# Patient Record
Sex: MALE | Race: BLACK OR AFRICAN AMERICAN | NOT HISPANIC OR LATINO | URBAN - METROPOLITAN AREA
[De-identification: names, ages, dates, MRNs, and addresses within clinical notes are randomized per-mention and may not be internally consistent; named-entity substitution may affect disease eponyms.]

---

## 2017-03-29 ENCOUNTER — INPATIENT (INPATIENT)
Facility: HOSPITAL | Age: 52
LOS: 6 days | Discharge: HOME | End: 2017-04-05
Attending: PSYCHIATRY & NEUROLOGY | Admitting: PSYCHIATRY & NEUROLOGY
Payer: COMMERCIAL

## 2017-03-29 VITALS
RESPIRATION RATE: 17 BRPM | SYSTOLIC BLOOD PRESSURE: 153 MMHG | DIASTOLIC BLOOD PRESSURE: 89 MMHG | OXYGEN SATURATION: 100 % | HEART RATE: 84 BPM

## 2017-03-29 DIAGNOSIS — Z90.49 ACQUIRED ABSENCE OF OTHER SPECIFIED PARTS OF DIGESTIVE TRACT: Chronic | ICD-10-CM

## 2017-03-29 DIAGNOSIS — F25.1 SCHIZOAFFECTIVE DISORDER, DEPRESSIVE TYPE: ICD-10-CM

## 2017-03-29 LAB
ALBUMIN SERPL ELPH-MCNC: 4.2 G/DL — SIGNIFICANT CHANGE UP (ref 3.3–5)
ALP SERPL-CCNC: 74 U/L — SIGNIFICANT CHANGE UP (ref 40–120)
ALT FLD-CCNC: 25 U/L — SIGNIFICANT CHANGE UP (ref 4–41)
AMPHET UR-MCNC: NEGATIVE — SIGNIFICANT CHANGE UP
APAP SERPL-MCNC: < 15 UG/ML — LOW (ref 15–25)
APPEARANCE UR: CLEAR — SIGNIFICANT CHANGE UP
AST SERPL-CCNC: 24 U/L — SIGNIFICANT CHANGE UP (ref 4–40)
BACTERIA # UR AUTO: SIGNIFICANT CHANGE UP
BARBITURATES MEASUREMENT: NEGATIVE — SIGNIFICANT CHANGE UP
BARBITURATES UR SCN-MCNC: NEGATIVE — SIGNIFICANT CHANGE UP
BASOPHILS # BLD AUTO: 0.03 K/UL — SIGNIFICANT CHANGE UP (ref 0–0.2)
BASOPHILS NFR BLD AUTO: 0.4 % — SIGNIFICANT CHANGE UP (ref 0–2)
BENZODIAZ SERPL-MCNC: NEGATIVE — SIGNIFICANT CHANGE UP
BENZODIAZ UR-MCNC: NEGATIVE — SIGNIFICANT CHANGE UP
BILIRUB SERPL-MCNC: 0.2 MG/DL — SIGNIFICANT CHANGE UP (ref 0.2–1.2)
BILIRUB UR-MCNC: NEGATIVE — SIGNIFICANT CHANGE UP
BLOOD UR QL VISUAL: NEGATIVE — SIGNIFICANT CHANGE UP
BUN SERPL-MCNC: 18 MG/DL — SIGNIFICANT CHANGE UP (ref 7–23)
CALCIUM SERPL-MCNC: 10.2 MG/DL — SIGNIFICANT CHANGE UP (ref 8.4–10.5)
CANNABINOIDS UR-MCNC: POSITIVE — SIGNIFICANT CHANGE UP
CHLORIDE SERPL-SCNC: 99 MMOL/L — SIGNIFICANT CHANGE UP (ref 98–107)
CO2 SERPL-SCNC: 23 MMOL/L — SIGNIFICANT CHANGE UP (ref 22–31)
COCAINE METAB.OTHER UR-MCNC: NEGATIVE — SIGNIFICANT CHANGE UP
COLOR SPEC: SIGNIFICANT CHANGE UP
CREAT SERPL-MCNC: 1.07 MG/DL — SIGNIFICANT CHANGE UP (ref 0.5–1.3)
EOSINOPHIL # BLD AUTO: 0.21 K/UL — SIGNIFICANT CHANGE UP (ref 0–0.5)
EOSINOPHIL NFR BLD AUTO: 3.1 % — SIGNIFICANT CHANGE UP (ref 0–6)
ETHANOL BLD-MCNC: < 10 MG/DL — SIGNIFICANT CHANGE UP
GLUCOSE SERPL-MCNC: 97 MG/DL — SIGNIFICANT CHANGE UP (ref 70–99)
GLUCOSE UR-MCNC: NEGATIVE — SIGNIFICANT CHANGE UP
HCT VFR BLD CALC: 42.1 % — SIGNIFICANT CHANGE UP (ref 39–50)
HGB BLD-MCNC: 14.5 G/DL — SIGNIFICANT CHANGE UP (ref 13–17)
IMM GRANULOCYTES NFR BLD AUTO: 0.3 % — SIGNIFICANT CHANGE UP (ref 0–1.5)
KETONES UR-MCNC: NEGATIVE — SIGNIFICANT CHANGE UP
LEUKOCYTE ESTERASE UR-ACNC: NEGATIVE — SIGNIFICANT CHANGE UP
LYMPHOCYTES # BLD AUTO: 2.27 K/UL — SIGNIFICANT CHANGE UP (ref 1–3.3)
LYMPHOCYTES # BLD AUTO: 33.3 % — SIGNIFICANT CHANGE UP (ref 13–44)
MCHC RBC-ENTMCNC: 31.7 PG — SIGNIFICANT CHANGE UP (ref 27–34)
MCHC RBC-ENTMCNC: 34.4 % — SIGNIFICANT CHANGE UP (ref 32–36)
MCV RBC AUTO: 92.1 FL — SIGNIFICANT CHANGE UP (ref 80–100)
METHADONE UR-MCNC: NEGATIVE — SIGNIFICANT CHANGE UP
MONOCYTES # BLD AUTO: 0.4 K/UL — SIGNIFICANT CHANGE UP (ref 0–0.9)
MONOCYTES NFR BLD AUTO: 5.9 % — SIGNIFICANT CHANGE UP (ref 2–14)
NEUTROPHILS # BLD AUTO: 3.89 K/UL — SIGNIFICANT CHANGE UP (ref 1.8–7.4)
NEUTROPHILS NFR BLD AUTO: 57 % — SIGNIFICANT CHANGE UP (ref 43–77)
NITRITE UR-MCNC: NEGATIVE — SIGNIFICANT CHANGE UP
OPIATES UR-MCNC: NEGATIVE — SIGNIFICANT CHANGE UP
OXYCODONE UR-MCNC: NEGATIVE — SIGNIFICANT CHANGE UP
PCP UR-MCNC: NEGATIVE — SIGNIFICANT CHANGE UP
PH UR: 7.5 — SIGNIFICANT CHANGE UP (ref 4.6–8)
PLATELET # BLD AUTO: 193 K/UL — SIGNIFICANT CHANGE UP (ref 150–400)
PMV BLD: 11.7 FL — SIGNIFICANT CHANGE UP (ref 7–13)
POTASSIUM SERPL-MCNC: 4 MMOL/L — SIGNIFICANT CHANGE UP (ref 3.5–5.3)
POTASSIUM SERPL-SCNC: 4 MMOL/L — SIGNIFICANT CHANGE UP (ref 3.5–5.3)
PROT SERPL-MCNC: 7.4 G/DL — SIGNIFICANT CHANGE UP (ref 6–8.3)
PROT UR-MCNC: NEGATIVE — SIGNIFICANT CHANGE UP
RBC # BLD: 4.57 M/UL — SIGNIFICANT CHANGE UP (ref 4.2–5.8)
RBC # FLD: 14.2 % — SIGNIFICANT CHANGE UP (ref 10.3–14.5)
RBC CASTS # UR COMP ASSIST: SIGNIFICANT CHANGE UP (ref 0–?)
SALICYLATES SERPL-MCNC: < 5 MG/DL — LOW (ref 15–30)
SODIUM SERPL-SCNC: 138 MMOL/L — SIGNIFICANT CHANGE UP (ref 135–145)
SP GR SPEC: 1.02 — SIGNIFICANT CHANGE UP (ref 1–1.03)
SQUAMOUS # UR AUTO: SIGNIFICANT CHANGE UP
TSH SERPL-MCNC: 2.98 UIU/ML — SIGNIFICANT CHANGE UP (ref 0.27–4.2)
UROBILINOGEN FLD QL: NORMAL E.U. — SIGNIFICANT CHANGE UP (ref 0.1–0.2)
VALPROATE SERPL-MCNC: 47.6 UG/ML — LOW (ref 50–100)
WBC # BLD: 6.82 K/UL — SIGNIFICANT CHANGE UP (ref 3.8–10.5)
WBC # FLD AUTO: 6.82 K/UL — SIGNIFICANT CHANGE UP (ref 3.8–10.5)
WBC UR QL: SIGNIFICANT CHANGE UP (ref 0–?)

## 2017-03-29 PROCEDURE — 99285 EMERGENCY DEPT VISIT HI MDM: CPT

## 2017-03-29 RX ORDER — TIOTROPIUM BROMIDE 18 UG/1
1 CAPSULE ORAL; RESPIRATORY (INHALATION) DAILY
Qty: 0 | Refills: 0 | Status: DISCONTINUED | OUTPATIENT
Start: 2017-03-29 | End: 2017-04-05

## 2017-03-29 RX ORDER — SERTRALINE 25 MG/1
200 TABLET, FILM COATED ORAL DAILY
Qty: 0 | Refills: 0 | Status: DISCONTINUED | OUTPATIENT
Start: 2017-03-29 | End: 2017-04-05

## 2017-03-29 RX ORDER — DOXAZOSIN MESYLATE 4 MG
1 TABLET ORAL AT BEDTIME
Qty: 0 | Refills: 0 | Status: DISCONTINUED | OUTPATIENT
Start: 2017-03-29 | End: 2017-03-29

## 2017-03-29 RX ORDER — SERTRALINE 25 MG/1
100 TABLET, FILM COATED ORAL
Qty: 0 | Refills: 0 | Status: DISCONTINUED | OUTPATIENT
Start: 2017-03-29 | End: 2017-03-29

## 2017-03-29 RX ORDER — BUDESONIDE AND FORMOTEROL FUMARATE DIHYDRATE 160; 4.5 UG/1; UG/1
1 AEROSOL RESPIRATORY (INHALATION)
Qty: 0 | Refills: 0 | Status: DISCONTINUED | OUTPATIENT
Start: 2017-03-29 | End: 2017-04-05

## 2017-03-29 RX ORDER — ALBUTEROL 90 UG/1
2 AEROSOL, METERED ORAL EVERY 4 HOURS
Qty: 0 | Refills: 0 | Status: DISCONTINUED | OUTPATIENT
Start: 2017-03-29 | End: 2017-04-05

## 2017-03-29 RX ORDER — ZIPRASIDONE HYDROCHLORIDE 20 MG/1
40 CAPSULE ORAL ONCE
Qty: 0 | Refills: 0 | Status: DISCONTINUED | OUTPATIENT
Start: 2017-03-29 | End: 2017-03-29

## 2017-03-29 RX ORDER — DIPHENHYDRAMINE HCL 50 MG
50 CAPSULE ORAL EVERY 8 HOURS
Qty: 0 | Refills: 0 | Status: DISCONTINUED | OUTPATIENT
Start: 2017-03-29 | End: 2017-04-05

## 2017-03-29 RX ORDER — ZIPRASIDONE HYDROCHLORIDE 20 MG/1
40 CAPSULE ORAL ONCE
Qty: 0 | Refills: 0 | Status: COMPLETED | OUTPATIENT
Start: 2017-03-29 | End: 2017-03-29

## 2017-03-29 RX ORDER — ZIPRASIDONE HYDROCHLORIDE 20 MG/1
40 CAPSULE ORAL
Qty: 0 | Refills: 0 | Status: DISCONTINUED | OUTPATIENT
Start: 2017-03-29 | End: 2017-03-29

## 2017-03-29 RX ORDER — DIVALPROEX SODIUM 500 MG/1
500 TABLET, DELAYED RELEASE ORAL
Qty: 0 | Refills: 0 | Status: DISCONTINUED | OUTPATIENT
Start: 2017-03-29 | End: 2017-04-05

## 2017-03-29 RX ORDER — GABAPENTIN 400 MG/1
300 CAPSULE ORAL DAILY
Qty: 0 | Refills: 0 | Status: DISCONTINUED | OUTPATIENT
Start: 2017-03-29 | End: 2017-04-05

## 2017-03-29 RX ORDER — METFORMIN HYDROCHLORIDE 850 MG/1
500 TABLET ORAL
Qty: 0 | Refills: 0 | Status: DISCONTINUED | OUTPATIENT
Start: 2017-03-29 | End: 2017-04-05

## 2017-03-29 RX ORDER — DIVALPROEX SODIUM 500 MG/1
500 TABLET, DELAYED RELEASE ORAL AT BEDTIME
Qty: 0 | Refills: 0 | Status: DISCONTINUED | OUTPATIENT
Start: 2017-03-29 | End: 2017-03-29

## 2017-03-29 RX ORDER — TRIHEXYPHENIDYL HCL 2 MG
2 TABLET ORAL AT BEDTIME
Qty: 0 | Refills: 0 | Status: DISCONTINUED | OUTPATIENT
Start: 2017-03-29 | End: 2017-04-05

## 2017-03-29 RX ORDER — PRAZOSIN HCL 2 MG
1 CAPSULE ORAL AT BEDTIME
Qty: 0 | Refills: 0 | Status: DISCONTINUED | OUTPATIENT
Start: 2017-03-29 | End: 2017-04-05

## 2017-03-29 RX ADMIN — ZIPRASIDONE HYDROCHLORIDE 40 MILLIGRAM(S): 20 CAPSULE ORAL at 21:50

## 2017-03-29 RX ADMIN — Medication 10 MILLIGRAM(S): at 21:50

## 2017-03-29 RX ADMIN — Medication 2 MILLIGRAM(S): at 21:50

## 2017-03-29 RX ADMIN — DIVALPROEX SODIUM 500 MILLIGRAM(S): 500 TABLET, DELAYED RELEASE ORAL at 21:50

## 2017-03-29 RX ADMIN — Medication 1 MILLIGRAM(S): at 21:50

## 2017-03-29 RX ADMIN — Medication 2 MILLIGRAM(S): at 07:50

## 2017-03-29 RX ADMIN — BUDESONIDE AND FORMOTEROL FUMARATE DIHYDRATE 1 PUFF(S): 160; 4.5 AEROSOL RESPIRATORY (INHALATION) at 21:51
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
## 2017-03-29 NOTE — ED BEHAVIORAL HEALTH ASSESSMENT NOTE - CURRENT MEDICATION
Geodon 80 mg BID, Zoloft 200 mg Q AM, gabapentin does unknown, Depakote does unknown, Buspar 30 mg Q AM, Metformin 500 mg BID, Lisinopril does unknown, Spiriva Q AM, Symbicort BID, Albuterol PRN Geodon 40 mg BID, Zoloft 200 mg Q AM, gabapentin 300 Q AM, Depakote 500 mg Q HS, Buspar 15 mg BID, Metformin 500 mg BID, Lisinopril does unknown, Spiriva Q AM, Symbicort BID, Albuterol PRN Geodon 120mg q AM and 80mg q HS, Zoloft 100mg BID, gabapentin 300 Q AM, Depakote 500 mg Q HS, Buspar 15 mg BID, Metformin 500 mg BID, Prazosin 1mg q HS, Artane 2mg BID, Lisinopril does unknown, Spiriva Q AM, Symbicort BID, Albuterol PRN

## 2017-03-29 NOTE — ED PROVIDER NOTE - OBJECTIVE STATEMENT
50 yo M with  hx of asthma paranoia presenting with increasing paranoia and HI. pt denies HA, dizziness, cp, sob, abd pain, n/v/d or dysuria or si.

## 2017-03-29 NOTE — ED ADULT TRIAGE NOTE - CHIEF COMPLAINT QUOTE
Pt with hx of schizoaffective disorder states he feels depressed and paranoid X1day. Also admits to HI, not to anyone specific. Denies visual/auditory hallucinations, SI, ETOH or drug use. Denies any medical complaints at this time.

## 2017-03-29 NOTE — ED BEHAVIORAL HEALTH ASSESSMENT NOTE - OTHER PAST PSYCHIATRIC HISTORY (INCLUDE DETAILS REGARDING ONSET, COURSE OF ILLNESS, INPATIENT/OUTPATIENT TREATMENT)
Patient sat in intermediate from [id number] and received his mental health services there. Hospitalized in 1991 for an a suicide attempt of drinking bleach. In 2008 patient was hospitalized for one day at [facility] for non compliance with medication.

## 2017-03-29 NOTE — ED BEHAVIORAL HEALTH ASSESSMENT NOTE - MEDICAL ISSUES AND PLAN (INCLUDE STANDING AND PRN MEDICATION)
HTN, DM, Asthma, sleep apnea, continue with patients current medications: Metformin 500 mg BID, Symbicort 1 puff BID, Spiriva AM, please confirm dose of Lisinopril
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
## 2017-03-29 NOTE — ED BEHAVIORAL HEALTH ASSESSMENT NOTE - PSYCHIATRIC ISSUES AND PLAN (INCLUDE STANDING AND PRN MEDICATION)
continue all medications as currently prescribed. Increase VPA to 500mg bid, decrease buspar to 10mg bid, switch zoloft to AM dosing, decrease Geodon to 40mg at bedtime x 1 with plan for inpatient team to evaluate dosing/choice of medication, continue prazosin 1mg q hs,

## 2017-03-29 NOTE — ED PROVIDER NOTE - MEDICAL DECISION MAKING DETAILS
52 yo M with hx of asthma paranoia presenting with worsening paranoia will obtain labs for medical clearance
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
## 2017-03-29 NOTE — ED BEHAVIORAL HEALTH ASSESSMENT NOTE - SUICIDE RISK FACTORS
Highly impulsive behavior/Agitation/severe anxiety/History of abuse/trauma/Mood episode/Substance abuse/dependence

## 2017-03-29 NOTE — ED PROVIDER NOTE - CHPI ED SYMPTOMS NEG
no hallucinations/no weight loss/no change in level of consciousness/no disorientation/no confusion/no weakness/no suicidal/no agitation

## 2017-03-29 NOTE — ED BEHAVIORAL HEALTH ASSESSMENT NOTE - DESCRIPTION
anxious, cooperative HTN, Asthma, DM, sleep apnea Patient is domiciled with his wife, currently employed.

## 2017-03-30 PROCEDURE — 99223 1ST HOSP IP/OBS HIGH 75: CPT

## 2017-03-30 RX ORDER — ZIPRASIDONE HYDROCHLORIDE 20 MG/1
40 CAPSULE ORAL
Qty: 0 | Refills: 0 | Status: DISCONTINUED | OUTPATIENT
Start: 2017-03-30 | End: 2017-04-05

## 2017-03-30 RX ORDER — MONTELUKAST 4 MG/1
10 TABLET, CHEWABLE ORAL DAILY
Qty: 0 | Refills: 0 | Status: DISCONTINUED | OUTPATIENT
Start: 2017-03-30 | End: 2017-03-30

## 2017-03-30 RX ORDER — ZIPRASIDONE HYDROCHLORIDE 20 MG/1
80 CAPSULE ORAL
Qty: 0 | Refills: 0 | Status: DISCONTINUED | OUTPATIENT
Start: 2017-03-30 | End: 2017-03-30

## 2017-03-30 RX ORDER — TUBERCULIN PURIFIED PROTEIN DERIVATIVE 5 [IU]/.1ML
5 INJECTION, SOLUTION INTRADERMAL ONCE
Qty: 0 | Refills: 0 | Status: COMPLETED | OUTPATIENT
Start: 2017-03-30 | End: 2017-03-31

## 2017-03-30 RX ORDER — MONTELUKAST 4 MG/1
10 TABLET, CHEWABLE ORAL AT BEDTIME
Qty: 0 | Refills: 0 | Status: DISCONTINUED | OUTPATIENT
Start: 2017-03-30 | End: 2017-04-05

## 2017-03-30 RX ADMIN — SERTRALINE 200 MILLIGRAM(S): 25 TABLET, FILM COATED ORAL at 09:02

## 2017-03-30 RX ADMIN — ALBUTEROL 2 PUFF(S): 90 AEROSOL, METERED ORAL at 21:57

## 2017-03-30 RX ADMIN — BUDESONIDE AND FORMOTEROL FUMARATE DIHYDRATE 1 PUFF(S): 160; 4.5 AEROSOL RESPIRATORY (INHALATION) at 08:37

## 2017-03-30 RX ADMIN — GABAPENTIN 300 MILLIGRAM(S): 400 CAPSULE ORAL at 09:02

## 2017-03-30 RX ADMIN — Medication 2 MILLIGRAM(S): at 21:57

## 2017-03-30 RX ADMIN — DIVALPROEX SODIUM 500 MILLIGRAM(S): 500 TABLET, DELAYED RELEASE ORAL at 09:02

## 2017-03-30 RX ADMIN — METFORMIN HYDROCHLORIDE 500 MILLIGRAM(S): 850 TABLET ORAL at 16:28

## 2017-03-30 RX ADMIN — TIOTROPIUM BROMIDE 1 CAPSULE(S): 18 CAPSULE ORAL; RESPIRATORY (INHALATION) at 09:51

## 2017-03-30 RX ADMIN — Medication 1 MILLIGRAM(S): at 21:57

## 2017-03-30 RX ADMIN — DIVALPROEX SODIUM 500 MILLIGRAM(S): 500 TABLET, DELAYED RELEASE ORAL at 21:57

## 2017-03-30 RX ADMIN — METFORMIN HYDROCHLORIDE 500 MILLIGRAM(S): 850 TABLET ORAL at 09:02

## 2017-03-30 RX ADMIN — MONTELUKAST 10 MILLIGRAM(S): 4 TABLET, CHEWABLE ORAL at 21:57

## 2017-03-30 RX ADMIN — Medication 10 MILLIGRAM(S): at 21:57

## 2017-03-30 RX ADMIN — BUDESONIDE AND FORMOTEROL FUMARATE DIHYDRATE 1 PUFF(S): 160; 4.5 AEROSOL RESPIRATORY (INHALATION) at 21:57

## 2017-03-30 RX ADMIN — Medication 10 MILLIGRAM(S): at 09:02

## 2017-03-30 RX ADMIN — Medication 2 MILLIGRAM(S): at 16:28

## 2017-03-30 RX ADMIN — ALBUTEROL 2 PUFF(S): 90 AEROSOL, METERED ORAL at 17:28

## 2017-03-30 RX ADMIN — ALBUTEROL 2 PUFF(S): 90 AEROSOL, METERED ORAL at 11:12

## 2017-03-30 RX ADMIN — ZIPRASIDONE HYDROCHLORIDE 40 MILLIGRAM(S): 20 CAPSULE ORAL at 16:28

## 2017-03-31 PROCEDURE — 99232 SBSQ HOSP IP/OBS MODERATE 35: CPT

## 2017-03-31 RX ADMIN — TUBERCULIN PURIFIED PROTEIN DERIVATIVE 5 UNIT(S): 5 INJECTION, SOLUTION INTRADERMAL at 11:24

## 2017-03-31 RX ADMIN — BUDESONIDE AND FORMOTEROL FUMARATE DIHYDRATE 1 PUFF(S): 160; 4.5 AEROSOL RESPIRATORY (INHALATION) at 09:56

## 2017-03-31 RX ADMIN — ZIPRASIDONE HYDROCHLORIDE 40 MILLIGRAM(S): 20 CAPSULE ORAL at 09:25

## 2017-03-31 RX ADMIN — METFORMIN HYDROCHLORIDE 500 MILLIGRAM(S): 850 TABLET ORAL at 17:34

## 2017-03-31 RX ADMIN — Medication 2 MILLIGRAM(S): at 20:30

## 2017-03-31 RX ADMIN — BUDESONIDE AND FORMOTEROL FUMARATE DIHYDRATE 1 PUFF(S): 160; 4.5 AEROSOL RESPIRATORY (INHALATION) at 20:30

## 2017-03-31 RX ADMIN — TIOTROPIUM BROMIDE 1 CAPSULE(S): 18 CAPSULE ORAL; RESPIRATORY (INHALATION) at 09:57

## 2017-03-31 RX ADMIN — Medication 10 MILLIGRAM(S): at 20:30

## 2017-03-31 RX ADMIN — SERTRALINE 200 MILLIGRAM(S): 25 TABLET, FILM COATED ORAL at 09:25

## 2017-03-31 RX ADMIN — GABAPENTIN 300 MILLIGRAM(S): 400 CAPSULE ORAL at 09:25

## 2017-03-31 RX ADMIN — Medication 1 MILLIGRAM(S): at 20:30

## 2017-03-31 RX ADMIN — DIVALPROEX SODIUM 500 MILLIGRAM(S): 500 TABLET, DELAYED RELEASE ORAL at 20:30

## 2017-03-31 RX ADMIN — DIVALPROEX SODIUM 500 MILLIGRAM(S): 500 TABLET, DELAYED RELEASE ORAL at 09:25

## 2017-03-31 RX ADMIN — Medication 10 MILLIGRAM(S): at 09:25

## 2017-03-31 RX ADMIN — METFORMIN HYDROCHLORIDE 500 MILLIGRAM(S): 850 TABLET ORAL at 09:25

## 2017-03-31 RX ADMIN — MONTELUKAST 10 MILLIGRAM(S): 4 TABLET, CHEWABLE ORAL at 20:30

## 2017-03-31 RX ADMIN — Medication 50 MILLIGRAM(S): at 22:33

## 2017-03-31 RX ADMIN — ZIPRASIDONE HYDROCHLORIDE 40 MILLIGRAM(S): 20 CAPSULE ORAL at 17:34

## 2017-04-01 PROCEDURE — 99232 SBSQ HOSP IP/OBS MODERATE 35: CPT

## 2017-04-01 RX ADMIN — MONTELUKAST 10 MILLIGRAM(S): 4 TABLET, CHEWABLE ORAL at 20:44

## 2017-04-01 RX ADMIN — Medication 2 MILLIGRAM(S): at 17:20

## 2017-04-01 RX ADMIN — Medication 10 MILLIGRAM(S): at 08:27

## 2017-04-01 RX ADMIN — TIOTROPIUM BROMIDE 1 CAPSULE(S): 18 CAPSULE ORAL; RESPIRATORY (INHALATION) at 09:09

## 2017-04-01 RX ADMIN — METFORMIN HYDROCHLORIDE 500 MILLIGRAM(S): 850 TABLET ORAL at 17:15

## 2017-04-01 RX ADMIN — DIVALPROEX SODIUM 500 MILLIGRAM(S): 500 TABLET, DELAYED RELEASE ORAL at 08:27

## 2017-04-01 RX ADMIN — Medication 2 MILLIGRAM(S): at 20:45

## 2017-04-01 RX ADMIN — BUDESONIDE AND FORMOTEROL FUMARATE DIHYDRATE 1 PUFF(S): 160; 4.5 AEROSOL RESPIRATORY (INHALATION) at 10:00

## 2017-04-01 RX ADMIN — ALBUTEROL 2 PUFF(S): 90 AEROSOL, METERED ORAL at 17:14

## 2017-04-01 RX ADMIN — SERTRALINE 200 MILLIGRAM(S): 25 TABLET, FILM COATED ORAL at 08:26

## 2017-04-01 RX ADMIN — ZIPRASIDONE HYDROCHLORIDE 40 MILLIGRAM(S): 20 CAPSULE ORAL at 17:15

## 2017-04-01 RX ADMIN — ZIPRASIDONE HYDROCHLORIDE 40 MILLIGRAM(S): 20 CAPSULE ORAL at 08:26

## 2017-04-01 RX ADMIN — METFORMIN HYDROCHLORIDE 500 MILLIGRAM(S): 850 TABLET ORAL at 08:26

## 2017-04-01 RX ADMIN — Medication 10 MILLIGRAM(S): at 20:44

## 2017-04-01 RX ADMIN — BUDESONIDE AND FORMOTEROL FUMARATE DIHYDRATE 1 PUFF(S): 160; 4.5 AEROSOL RESPIRATORY (INHALATION) at 20:44

## 2017-04-01 RX ADMIN — GABAPENTIN 300 MILLIGRAM(S): 400 CAPSULE ORAL at 08:26

## 2017-04-01 RX ADMIN — Medication 1 MILLIGRAM(S): at 20:44

## 2017-04-01 RX ADMIN — DIVALPROEX SODIUM 500 MILLIGRAM(S): 500 TABLET, DELAYED RELEASE ORAL at 20:44

## 2017-04-02 PROCEDURE — 99232 SBSQ HOSP IP/OBS MODERATE 35: CPT

## 2017-04-02 RX ADMIN — ALBUTEROL 2 PUFF(S): 90 AEROSOL, METERED ORAL at 17:44

## 2017-04-02 RX ADMIN — Medication 10 MILLIGRAM(S): at 08:50

## 2017-04-02 RX ADMIN — METFORMIN HYDROCHLORIDE 500 MILLIGRAM(S): 850 TABLET ORAL at 17:45

## 2017-04-02 RX ADMIN — BUDESONIDE AND FORMOTEROL FUMARATE DIHYDRATE 1 PUFF(S): 160; 4.5 AEROSOL RESPIRATORY (INHALATION) at 08:45

## 2017-04-02 RX ADMIN — ZIPRASIDONE HYDROCHLORIDE 40 MILLIGRAM(S): 20 CAPSULE ORAL at 08:50

## 2017-04-02 RX ADMIN — Medication 2 MILLIGRAM(S): at 17:45

## 2017-04-02 RX ADMIN — Medication 10 MILLIGRAM(S): at 20:09

## 2017-04-02 RX ADMIN — METFORMIN HYDROCHLORIDE 500 MILLIGRAM(S): 850 TABLET ORAL at 08:50

## 2017-04-02 RX ADMIN — ALBUTEROL 2 PUFF(S): 90 AEROSOL, METERED ORAL at 21:44

## 2017-04-02 RX ADMIN — Medication 2 MILLIGRAM(S): at 20:10

## 2017-04-02 RX ADMIN — TIOTROPIUM BROMIDE 1 CAPSULE(S): 18 CAPSULE ORAL; RESPIRATORY (INHALATION) at 08:49

## 2017-04-02 RX ADMIN — TUBERCULIN PURIFIED PROTEIN DERIVATIVE 5 UNIT(S): 5 INJECTION, SOLUTION INTRADERMAL at 14:37

## 2017-04-02 RX ADMIN — ZIPRASIDONE HYDROCHLORIDE 40 MILLIGRAM(S): 20 CAPSULE ORAL at 17:45

## 2017-04-02 RX ADMIN — DIVALPROEX SODIUM 500 MILLIGRAM(S): 500 TABLET, DELAYED RELEASE ORAL at 20:09

## 2017-04-02 RX ADMIN — SERTRALINE 200 MILLIGRAM(S): 25 TABLET, FILM COATED ORAL at 08:50

## 2017-04-02 RX ADMIN — GABAPENTIN 300 MILLIGRAM(S): 400 CAPSULE ORAL at 08:50

## 2017-04-02 RX ADMIN — MONTELUKAST 10 MILLIGRAM(S): 4 TABLET, CHEWABLE ORAL at 20:10

## 2017-04-02 RX ADMIN — DIVALPROEX SODIUM 500 MILLIGRAM(S): 500 TABLET, DELAYED RELEASE ORAL at 08:50

## 2017-04-02 RX ADMIN — Medication 1 MILLIGRAM(S): at 20:10

## 2017-04-03 PROCEDURE — 99232 SBSQ HOSP IP/OBS MODERATE 35: CPT

## 2017-04-03 RX ADMIN — DIVALPROEX SODIUM 500 MILLIGRAM(S): 500 TABLET, DELAYED RELEASE ORAL at 08:35

## 2017-04-03 RX ADMIN — MONTELUKAST 10 MILLIGRAM(S): 4 TABLET, CHEWABLE ORAL at 21:20

## 2017-04-03 RX ADMIN — METFORMIN HYDROCHLORIDE 500 MILLIGRAM(S): 850 TABLET ORAL at 17:18

## 2017-04-03 RX ADMIN — Medication 2 MILLIGRAM(S): at 21:20

## 2017-04-03 RX ADMIN — ALBUTEROL 2 PUFF(S): 90 AEROSOL, METERED ORAL at 08:10

## 2017-04-03 RX ADMIN — Medication 1 MILLIGRAM(S): at 21:20

## 2017-04-03 RX ADMIN — Medication 10 MILLIGRAM(S): at 21:20

## 2017-04-03 RX ADMIN — Medication 2 MILLIGRAM(S): at 08:35

## 2017-04-03 RX ADMIN — METFORMIN HYDROCHLORIDE 500 MILLIGRAM(S): 850 TABLET ORAL at 08:35

## 2017-04-03 RX ADMIN — DIVALPROEX SODIUM 500 MILLIGRAM(S): 500 TABLET, DELAYED RELEASE ORAL at 21:20

## 2017-04-03 RX ADMIN — Medication 10 MILLIGRAM(S): at 08:35

## 2017-04-03 RX ADMIN — BUDESONIDE AND FORMOTEROL FUMARATE DIHYDRATE 1 PUFF(S): 160; 4.5 AEROSOL RESPIRATORY (INHALATION) at 21:20

## 2017-04-03 RX ADMIN — ZIPRASIDONE HYDROCHLORIDE 40 MILLIGRAM(S): 20 CAPSULE ORAL at 08:35

## 2017-04-03 RX ADMIN — SERTRALINE 200 MILLIGRAM(S): 25 TABLET, FILM COATED ORAL at 08:35

## 2017-04-03 RX ADMIN — TIOTROPIUM BROMIDE 1 CAPSULE(S): 18 CAPSULE ORAL; RESPIRATORY (INHALATION) at 08:35

## 2017-04-03 RX ADMIN — GABAPENTIN 300 MILLIGRAM(S): 400 CAPSULE ORAL at 08:35

## 2017-04-03 RX ADMIN — BUDESONIDE AND FORMOTEROL FUMARATE DIHYDRATE 1 PUFF(S): 160; 4.5 AEROSOL RESPIRATORY (INHALATION) at 08:35

## 2017-04-03 RX ADMIN — ZIPRASIDONE HYDROCHLORIDE 40 MILLIGRAM(S): 20 CAPSULE ORAL at 17:18

## 2017-04-04 LAB
CHOLEST SERPL-MCNC: 128 MG/DL — SIGNIFICANT CHANGE UP (ref 120–199)
FOLATE SERPL-MCNC: 17.7 NG/ML — SIGNIFICANT CHANGE UP (ref 4.7–20)
HBA1C BLD-MCNC: 5.9 % — HIGH (ref 4–5.6)
HDLC SERPL-MCNC: 39 MG/DL — SIGNIFICANT CHANGE UP (ref 35–55)
LIPID PNL WITH DIRECT LDL SERPL: 78 MG/DL — SIGNIFICANT CHANGE UP
T PALLIDUM AB TITR SER: NEGATIVE — SIGNIFICANT CHANGE UP
TRIGL SERPL-MCNC: 78 MG/DL — SIGNIFICANT CHANGE UP (ref 10–149)
VALPROATE SERPL-MCNC: 41.1 UG/ML — LOW (ref 50–100)

## 2017-04-04 PROCEDURE — 99232 SBSQ HOSP IP/OBS MODERATE 35: CPT

## 2017-04-04 RX ORDER — DIVALPROEX SODIUM 500 MG/1
1 TABLET, DELAYED RELEASE ORAL
Qty: 30 | Refills: 0
Start: 2017-04-04 | End: 2017-04-19

## 2017-04-04 RX ORDER — DIPHENHYDRAMINE HCL 50 MG
50 CAPSULE ORAL ONCE
Qty: 0 | Refills: 0 | Status: COMPLETED | OUTPATIENT
Start: 2017-04-04 | End: 2017-04-04

## 2017-04-04 RX ORDER — PRAZOSIN HCL 2 MG
1 CAPSULE ORAL
Qty: 15 | Refills: 0 | OUTPATIENT
Start: 2017-04-04 | End: 2017-04-19

## 2017-04-04 RX ORDER — TRIHEXYPHENIDYL HCL 2 MG
1 TABLET ORAL
Qty: 15 | Refills: 0 | OUTPATIENT
Start: 2017-04-04 | End: 2017-04-19

## 2017-04-04 RX ORDER — SERTRALINE 25 MG/1
2 TABLET, FILM COATED ORAL
Qty: 30 | Refills: 0 | OUTPATIENT
Start: 2017-04-04 | End: 2017-04-19

## 2017-04-04 RX ORDER — METFORMIN HYDROCHLORIDE 850 MG/1
1 TABLET ORAL
Qty: 30 | Refills: 0 | OUTPATIENT
Start: 2017-04-04 | End: 2017-04-19

## 2017-04-04 RX ORDER — GABAPENTIN 400 MG/1
1 CAPSULE ORAL
Qty: 15 | Refills: 0 | OUTPATIENT
Start: 2017-04-04 | End: 2017-04-19

## 2017-04-04 RX ORDER — ZIPRASIDONE HYDROCHLORIDE 20 MG/1
1 CAPSULE ORAL
Qty: 30 | Refills: 0 | OUTPATIENT
Start: 2017-04-04 | End: 2017-04-19

## 2017-04-04 RX ADMIN — ZIPRASIDONE HYDROCHLORIDE 40 MILLIGRAM(S): 20 CAPSULE ORAL at 08:48

## 2017-04-04 RX ADMIN — Medication 2 MILLIGRAM(S): at 20:10

## 2017-04-04 RX ADMIN — Medication 2 MILLIGRAM(S): at 17:48

## 2017-04-04 RX ADMIN — MONTELUKAST 10 MILLIGRAM(S): 4 TABLET, CHEWABLE ORAL at 20:10

## 2017-04-04 RX ADMIN — Medication 50 MILLIGRAM(S): at 21:18

## 2017-04-04 RX ADMIN — DIVALPROEX SODIUM 500 MILLIGRAM(S): 500 TABLET, DELAYED RELEASE ORAL at 20:10

## 2017-04-04 RX ADMIN — BUDESONIDE AND FORMOTEROL FUMARATE DIHYDRATE 1 PUFF(S): 160; 4.5 AEROSOL RESPIRATORY (INHALATION) at 08:48

## 2017-04-04 RX ADMIN — SERTRALINE 200 MILLIGRAM(S): 25 TABLET, FILM COATED ORAL at 08:48

## 2017-04-04 RX ADMIN — BUDESONIDE AND FORMOTEROL FUMARATE DIHYDRATE 1 PUFF(S): 160; 4.5 AEROSOL RESPIRATORY (INHALATION) at 20:10

## 2017-04-04 RX ADMIN — Medication 10 MILLIGRAM(S): at 08:47

## 2017-04-04 RX ADMIN — Medication 1 MILLIGRAM(S): at 20:10

## 2017-04-04 RX ADMIN — ZIPRASIDONE HYDROCHLORIDE 40 MILLIGRAM(S): 20 CAPSULE ORAL at 17:48

## 2017-04-04 RX ADMIN — Medication 2 MILLIGRAM(S): at 08:48

## 2017-04-04 RX ADMIN — METFORMIN HYDROCHLORIDE 500 MILLIGRAM(S): 850 TABLET ORAL at 17:48

## 2017-04-04 RX ADMIN — Medication 10 MILLIGRAM(S): at 20:10

## 2017-04-04 RX ADMIN — METFORMIN HYDROCHLORIDE 500 MILLIGRAM(S): 850 TABLET ORAL at 08:47

## 2017-04-04 RX ADMIN — GABAPENTIN 300 MILLIGRAM(S): 400 CAPSULE ORAL at 08:47

## 2017-04-04 RX ADMIN — ALBUTEROL 2 PUFF(S): 90 AEROSOL, METERED ORAL at 08:48

## 2017-04-04 RX ADMIN — DIVALPROEX SODIUM 500 MILLIGRAM(S): 500 TABLET, DELAYED RELEASE ORAL at 10:30

## 2017-04-04 RX ADMIN — TIOTROPIUM BROMIDE 1 CAPSULE(S): 18 CAPSULE ORAL; RESPIRATORY (INHALATION) at 08:48

## 2017-04-05 VITALS — TEMPERATURE: 98 F

## 2017-04-05 PROCEDURE — 99238 HOSP IP/OBS DSCHRG MGMT 30/<: CPT

## 2017-04-05 RX ADMIN — BUDESONIDE AND FORMOTEROL FUMARATE DIHYDRATE 1 PUFF(S): 160; 4.5 AEROSOL RESPIRATORY (INHALATION) at 08:23

## 2017-04-05 RX ADMIN — DIVALPROEX SODIUM 500 MILLIGRAM(S): 500 TABLET, DELAYED RELEASE ORAL at 08:23

## 2017-04-05 RX ADMIN — GABAPENTIN 300 MILLIGRAM(S): 400 CAPSULE ORAL at 08:23

## 2017-04-05 RX ADMIN — Medication 10 MILLIGRAM(S): at 08:23

## 2017-04-05 RX ADMIN — SERTRALINE 200 MILLIGRAM(S): 25 TABLET, FILM COATED ORAL at 08:23

## 2017-04-05 RX ADMIN — ZIPRASIDONE HYDROCHLORIDE 40 MILLIGRAM(S): 20 CAPSULE ORAL at 08:23

## 2017-04-05 RX ADMIN — TIOTROPIUM BROMIDE 1 CAPSULE(S): 18 CAPSULE ORAL; RESPIRATORY (INHALATION) at 08:23

## 2017-04-05 RX ADMIN — METFORMIN HYDROCHLORIDE 500 MILLIGRAM(S): 850 TABLET ORAL at 08:23

## 2017-05-25 PROBLEM — Z00.00 ENCOUNTER FOR PREVENTIVE HEALTH EXAMINATION: Status: ACTIVE | Noted: 2017-05-25

## 2018-02-22 ENCOUNTER — EMERGENCY (EMERGENCY)
Facility: HOSPITAL | Age: 53
LOS: 1 days | Discharge: LEFT BEFORE DISCHARGE | End: 2018-02-22
Attending: EMERGENCY MEDICINE | Admitting: EMERGENCY MEDICINE
Payer: MEDICAID

## 2018-02-22 VITALS
OXYGEN SATURATION: 100 % | HEART RATE: 95 BPM | TEMPERATURE: 98 F | RESPIRATION RATE: 18 BRPM | DIASTOLIC BLOOD PRESSURE: 83 MMHG | SYSTOLIC BLOOD PRESSURE: 149 MMHG

## 2018-02-22 VITALS
RESPIRATION RATE: 16 BRPM | DIASTOLIC BLOOD PRESSURE: 76 MMHG | TEMPERATURE: 98 F | HEART RATE: 102 BPM | OXYGEN SATURATION: 98 % | SYSTOLIC BLOOD PRESSURE: 136 MMHG

## 2018-02-22 DIAGNOSIS — Z90.49 ACQUIRED ABSENCE OF OTHER SPECIFIED PARTS OF DIGESTIVE TRACT: Chronic | ICD-10-CM

## 2018-02-22 PROBLEM — J45.909 UNSPECIFIED ASTHMA, UNCOMPLICATED: Chronic | Status: ACTIVE | Noted: 2017-03-29

## 2018-02-22 LAB
ALBUMIN SERPL ELPH-MCNC: 4.3 G/DL — SIGNIFICANT CHANGE UP (ref 3.3–5)
ALP SERPL-CCNC: 77 U/L — SIGNIFICANT CHANGE UP (ref 40–120)
ALT FLD-CCNC: 28 U/L — SIGNIFICANT CHANGE UP (ref 4–41)
AST SERPL-CCNC: 27 U/L — SIGNIFICANT CHANGE UP (ref 4–40)
BASE EXCESS BLDV CALC-SCNC: 1.3 MMOL/L — SIGNIFICANT CHANGE UP
BASOPHILS # BLD AUTO: 0.04 K/UL — SIGNIFICANT CHANGE UP (ref 0–0.2)
BASOPHILS NFR BLD AUTO: 0.5 % — SIGNIFICANT CHANGE UP (ref 0–2)
BILIRUB SERPL-MCNC: 0.5 MG/DL — SIGNIFICANT CHANGE UP (ref 0.2–1.2)
BLOOD GAS VENOUS - CREATININE: 0.96 MG/DL — SIGNIFICANT CHANGE UP (ref 0.5–1.3)
BUN SERPL-MCNC: 20 MG/DL — SIGNIFICANT CHANGE UP (ref 7–23)
CALCIUM SERPL-MCNC: 10.3 MG/DL — SIGNIFICANT CHANGE UP (ref 8.4–10.5)
CHLORIDE BLDV-SCNC: 105 MMOL/L — SIGNIFICANT CHANGE UP (ref 96–108)
CHLORIDE SERPL-SCNC: 102 MMOL/L — SIGNIFICANT CHANGE UP (ref 98–107)
CO2 SERPL-SCNC: 24 MMOL/L — SIGNIFICANT CHANGE UP (ref 22–31)
CREAT SERPL-MCNC: 1 MG/DL — SIGNIFICANT CHANGE UP (ref 0.5–1.3)
CRP SERPL-MCNC: 4.2 MG/L — SIGNIFICANT CHANGE UP
EOSINOPHIL # BLD AUTO: 0.15 K/UL — SIGNIFICANT CHANGE UP (ref 0–0.5)
EOSINOPHIL NFR BLD AUTO: 1.9 % — SIGNIFICANT CHANGE UP (ref 0–6)
ERYTHROCYTE [SEDIMENTATION RATE] IN BLOOD: 4 MM/HR — SIGNIFICANT CHANGE UP (ref 1–15)
GAS PNL BLDV: 139 MMOL/L — SIGNIFICANT CHANGE UP (ref 136–146)
GLUCOSE BLDV-MCNC: 116 — HIGH (ref 70–99)
GLUCOSE SERPL-MCNC: 105 MG/DL — HIGH (ref 70–99)
HCO3 BLDV-SCNC: 25 MMOL/L — SIGNIFICANT CHANGE UP (ref 20–27)
HCT VFR BLD CALC: 40.6 % — SIGNIFICANT CHANGE UP (ref 39–50)
HCT VFR BLDV CALC: 43.4 % — SIGNIFICANT CHANGE UP (ref 39–51)
HGB BLD-MCNC: 14 G/DL — SIGNIFICANT CHANGE UP (ref 13–17)
HGB BLDV-MCNC: 14.1 G/DL — SIGNIFICANT CHANGE UP (ref 13–17)
IMM GRANULOCYTES # BLD AUTO: 0.05 # — SIGNIFICANT CHANGE UP
IMM GRANULOCYTES NFR BLD AUTO: 0.6 % — SIGNIFICANT CHANGE UP (ref 0–1.5)
LACTATE BLDV-MCNC: 1.4 MMOL/L — SIGNIFICANT CHANGE UP (ref 0.5–2)
LYMPHOCYTES # BLD AUTO: 1.48 K/UL — SIGNIFICANT CHANGE UP (ref 1–3.3)
LYMPHOCYTES # BLD AUTO: 18.7 % — SIGNIFICANT CHANGE UP (ref 13–44)
MCHC RBC-ENTMCNC: 32.9 PG — SIGNIFICANT CHANGE UP (ref 27–34)
MCHC RBC-ENTMCNC: 34.5 % — SIGNIFICANT CHANGE UP (ref 32–36)
MCV RBC AUTO: 95.3 FL — SIGNIFICANT CHANGE UP (ref 80–100)
MONOCYTES # BLD AUTO: 0.57 K/UL — SIGNIFICANT CHANGE UP (ref 0–0.9)
MONOCYTES NFR BLD AUTO: 7.2 % — SIGNIFICANT CHANGE UP (ref 2–14)
NEUTROPHILS # BLD AUTO: 5.61 K/UL — SIGNIFICANT CHANGE UP (ref 1.8–7.4)
NEUTROPHILS NFR BLD AUTO: 71.1 % — SIGNIFICANT CHANGE UP (ref 43–77)
NRBC # FLD: 0 — SIGNIFICANT CHANGE UP
PCO2 BLDV: 41 MMHG — SIGNIFICANT CHANGE UP (ref 41–51)
PH BLDV: 7.41 PH — SIGNIFICANT CHANGE UP (ref 7.32–7.43)
PLATELET # BLD AUTO: 172 K/UL — SIGNIFICANT CHANGE UP (ref 150–400)
PMV BLD: 11.7 FL — SIGNIFICANT CHANGE UP (ref 7–13)
PO2 BLDV: 83 MMHG — HIGH (ref 35–40)
POTASSIUM BLDV-SCNC: 4.4 MMOL/L — SIGNIFICANT CHANGE UP (ref 3.4–4.5)
POTASSIUM SERPL-MCNC: 4.1 MMOL/L — SIGNIFICANT CHANGE UP (ref 3.5–5.3)
POTASSIUM SERPL-SCNC: 4.1 MMOL/L — SIGNIFICANT CHANGE UP (ref 3.5–5.3)
PROT SERPL-MCNC: 7.4 G/DL — SIGNIFICANT CHANGE UP (ref 6–8.3)
RBC # BLD: 4.26 M/UL — SIGNIFICANT CHANGE UP (ref 4.2–5.8)
RBC # FLD: 14.6 % — HIGH (ref 10.3–14.5)
SAO2 % BLDV: 95.7 % — HIGH (ref 60–85)
SODIUM SERPL-SCNC: 140 MMOL/L — SIGNIFICANT CHANGE UP (ref 135–145)
WBC # BLD: 7.9 K/UL — SIGNIFICANT CHANGE UP (ref 3.8–10.5)
WBC # FLD AUTO: 7.9 K/UL — SIGNIFICANT CHANGE UP (ref 3.8–10.5)

## 2018-02-22 PROCEDURE — 73620 X-RAY EXAM OF FOOT: CPT | Mod: 26,LT

## 2018-02-22 PROCEDURE — 99284 EMERGENCY DEPT VISIT MOD MDM: CPT

## 2018-02-22 RX ORDER — SODIUM CHLORIDE 9 MG/ML
1000 INJECTION INTRAMUSCULAR; INTRAVENOUS; SUBCUTANEOUS ONCE
Qty: 0 | Refills: 0 | Status: COMPLETED | OUTPATIENT
Start: 2018-02-22 | End: 2018-02-22

## 2018-02-22 RX ORDER — KETOROLAC TROMETHAMINE 30 MG/ML
15 SYRINGE (ML) INJECTION ONCE
Qty: 0 | Refills: 0 | Status: DISCONTINUED | OUTPATIENT
Start: 2018-02-22 | End: 2018-02-22

## 2018-02-22 RX ORDER — TETANUS TOXOID, REDUCED DIPHTHERIA TOXOID AND ACELLULAR PERTUSSIS VACCINE, ADSORBED 5; 2.5; 8; 8; 2.5 [IU]/.5ML; [IU]/.5ML; UG/.5ML; UG/.5ML; UG/.5ML
0.5 SUSPENSION INTRAMUSCULAR ONCE
Qty: 0 | Refills: 0 | Status: COMPLETED | OUTPATIENT
Start: 2018-02-22 | End: 2018-02-22

## 2018-02-22 RX ADMIN — Medication 100 MILLIGRAM(S): at 10:34

## 2018-02-22 RX ADMIN — SODIUM CHLORIDE 2000 MILLILITER(S): 9 INJECTION INTRAMUSCULAR; INTRAVENOUS; SUBCUTANEOUS at 10:02

## 2018-02-22 RX ADMIN — TETANUS TOXOID, REDUCED DIPHTHERIA TOXOID AND ACELLULAR PERTUSSIS VACCINE, ADSORBED 0.5 MILLILITER(S): 5; 2.5; 8; 8; 2.5 SUSPENSION INTRAMUSCULAR at 10:01

## 2018-02-22 NOTE — ED PROVIDER NOTE - OBJECTIVE STATEMENT
Mr. Aguayo is a 53 yo M PMHx DM2, HTN p/w left foot wound s/p hitting his foot in the middle of the night 4 nights ago. The foot became swollen and painful after the abrasion and has gotten worse in the past 4 days. He denies any discharge but reports swelling and redness around the site. Denies fevers/chills, N/V, abd pain. Tetanus is not up to date.

## 2018-02-22 NOTE — ED PROVIDER NOTE - NS ED ROS FT
GENERAL: No fever or chills, EYES: no change in vision, HEENT: no trouble swallowing or speaking, CARDIAC: no chest pain, PULMONARY: no cough or SOB, GI: no abdominal pain, no nausea, no vomiting, no diarrhea or constipation, : No changes in urination, SKIN: no rashes, NEURO: no headache,  MSK: L foot wound with redness and swelling ~Ian Moon M.D. Resident

## 2018-02-22 NOTE — ED PROVIDER NOTE - ATTENDING CONTRIBUTION TO CARE
MD Aguero:  I performed a face to face bedside interview with patient regarding history of present illness, review of symptoms and past medical history. I completed an independent physical exam(documented below).  I have discussed patient's plan of care with resident.   I agree with note as stated above, having amended the EMR as needed to reflect my findings. I have discussed the assessment and plan of care.  This includes during the time I functioned as the attending physician for this patient.  PE:  Gen: Alert, NAD  Head: NC, AT,  EOMI, normal lids/conjunctiva  ENT:  normal hearing, patent oropharynx without erythema/exudate, uvula midline  Neck: +supple, no tenderness/meningismus/JVD, +Trachea midline  Chest: no chest wall tenderness, equal chest rise  Pulm: Bilateral BS, normal resp effort, no wheeze/stridor/retractions  CV: tachy, no M/R/G, +dist pulses  Abd: soft, NT/ND, +BS, no hepatosplenomegaly  Rectal: deferred  Mskel: left left/foot findings as documented below  Skin: abrasion/stage 2-3 ulcer over dorsal aspect of left foot, +edema, +erythema tracking up mid shin, +warmth, +ttp  Neuro: AAOx3  MDM:  53yo M w/ pmh inclusive of htn, DM2 on metformin, asthma, schizoaffective w/ paranoia and previous psych admission for HI, and previous medical admission for SI, here for left foot wound which occurred approx 4 days ago as he accidentally banged his foot against edge of wall, progressively worsening edema/pain, erythema spreading up leg. Denies fever or chills. H&P most consistent w/ cellulitis, labs and xray to r/o osteomyelitis, and CDU vs admission for IV abx.

## 2018-02-22 NOTE — ED ADULT NURSE REASSESSMENT NOTE - NS ED NURSE REASSESS COMMENT FT1
Pt lethargic but A&Ox4 Left foot abrasion on top of foot bet 2nd and last toe not draining currently.  Pt states he is lethargic due to medication lyrica that he takes for pain. As per MD pt to be admitted for IVAB Pt informed re above.

## 2018-02-22 NOTE — ED ADULT NURSE REASSESSMENT NOTE - NS ED NURSE REASSESS COMMENT FT1
Pt left hospital without informing any personnel and with his IVL in place. Called cell phone number x 2 and left message with return number to call. Pt did not respond. Notified  Assistant nurse gage sapp.

## 2018-02-22 NOTE — ED ADULT TRIAGE NOTE - CHIEF COMPLAINT QUOTE
Feel off bed "a couple of nights ago", and hit left foot "against the wall".  As per pat has a "big gap" to left outer foot area and c/o swelling

## 2018-02-22 NOTE — ED PROVIDER NOTE - PHYSICAL EXAMINATION
Gen: AAOx3, non-toxic  Head: NCAT  HEENT: EOMI, oral mucosa moist, normal conjunctiva  Lung: CTAB, no respiratory distress, no wheezes/rhonchi/rales B/L, speaking in full sentences  CV: RRR, no murmurs, rubs or gallops  Abd: soft, NTND, no guarding  MSK: no visible deformities  Neuro: No focal sensory or motor deficits  Skin: Warm, well perfused, no rash, L foot abrasion with surround erythema to mid shin and +1 edema  Psych: normal affect.   ~Ian Moon M.D. Resident

## 2018-02-22 NOTE — ED PROVIDER NOTE - MEDICAL DECISION MAKING DETAILS
51 yo M PMHx DM2, HTN p/w left foot wound concerning for cellulitis, will obtain basic labs, update tetanus IV antibiotics, CDU vs admit

## 2018-02-22 NOTE — ED PROVIDER NOTE - DISPOSITION TYPE
8/2/2017      Claudio Dennis  2005    Dear No primary care provider on file. We had the pleasure of seeing Claudio Dennis in the [facility] today as a new patient in evaluation of rectal bleeding. As you know, Claudio Dennis is 13 y.o. and is under treatment for acne vulgaris with Accutane. He has noticed that as his skin has dried out on this therapy, his stools also have become constipated and more firm. Luan Gray describes painful defecation, however he is still passing bowel movements daily. Luan Gray first saw bright red blood on the toilet paper 1 week ago. Due to concern of the development of Ulcerative Colitis in Luan Gray, his parents halted the Accutane. Of note, there are no chronic gastrointestinal symptoms. There has never been abdominal pain. Gordo's bowel movements are once daily and firm, never diarrhea. Luan Gray denies other symptoms of inflammatory bowel disease, such as fevers, oral ulceration, rash, and joint aches. Father agrees that UC is unlikely, but would like some reassurance that Luan Gray is healthy and has uncomplicated constipation before he restarts the Accutane. As Luan Gray has been affected quite a bit by the severe acne, the family is notably interested in restarted this therapy. Since your rectal exam recently, there has been no report of rectal bleeding. Thank you for your notes as they were most helpful to me in formulating a concise understanding of the problem. No Known Allergies  Medications: none at this time    PMHx: acne vulgaris      Social History    Lives with Biologic Parent Yes     Adopted No     Foster child No     Multiple Birth No     Smoke exposure No     Pets Yes 1 cat    Other lives with dad,[facility]    just moved from [facility]. Used to live near [facility] [place].     Family History   Problem Relation Age of Onset    Hypertension Mother     No Known Problems Father    no history of IBD or Celiac Disease    Immunizations are up to date by report. Review of Systems  A 12 point review of systems was reviewed and is included in the HPI, otherwise unremarkable. Physical Exam   height is 5' 10.08\" (1.78 m) and weight is 222 lb (100.7 kg). His oral temperature is 98.3 °F (36.8 °C). His blood pressure is 121/75 and his pulse is 85. His respiration is 18 and oxygen saturation is 97%. General: He is awake, alert, and in no distress, and appears to be well nourished and well hydrated. HEENT: The sclera appear anicteric, the conjunctiva pink, the oral mucosa appears without lesions, and the dentition is fair. Chest: Clear breath sounds without wheezing bilaterally. CV: Regular rate and rhythm without murmur  Abdomen: soft, non-tender, non-distended, without masses. There is no hepatosplenomegaly  Extremities: well perfused with no joint abnormalities  Skin: no rash, no jaundice  Neuro: moves all 4 well, alert  Lymph: no significant lymphadenopathy  Normal perianal exam    Studies:  None     Impression    Dinora Nicole is a 13year old young man with constipation and rectal bleeding from internal anal fissure. Inflammatory bowel disease is unlikely, however we will obtain a lab screen today promptly so that Dinora Nicole can resume Accutane. I offered a modest stool softener for the time remaining on Accutane. Plan  1. Lab screen: CBC, ESR, CRP, CMP, Celiac screen  2. Docusate trial  3. Return as needed for ongoing rectal bleeding so that we may consider colonoscopy           All patient and caregiver questions and concerns were addressed during the visit. Major risks, benefits, and side-effects of therapy were discussed. ELOPED

## 2018-02-22 NOTE — ED ADULT NURSE NOTE - OBJECTIVE STATEMENT
left foot with laceration on the dorsal area just above the toes. a/o x3 . pt fall asleep during iv insertion . as per pt he took some pill for depression making him sleepy. left foot is positive for swelling and redness.

## 2018-06-24 ENCOUNTER — EMERGENCY (EMERGENCY)
Facility: HOSPITAL | Age: 53
LOS: 1 days | Discharge: HOME | End: 2018-06-24
Attending: EMERGENCY MEDICINE | Admitting: EMERGENCY MEDICINE
Payer: MEDICAID

## 2018-06-24 VITALS
RESPIRATION RATE: 18 BRPM | TEMPERATURE: 98 F | DIASTOLIC BLOOD PRESSURE: 97 MMHG | SYSTOLIC BLOOD PRESSURE: 148 MMHG | HEART RATE: 88 BPM | OXYGEN SATURATION: 100 %

## 2018-06-24 DIAGNOSIS — Z90.49 ACQUIRED ABSENCE OF OTHER SPECIFIED PARTS OF DIGESTIVE TRACT: Chronic | ICD-10-CM

## 2018-06-24 LAB
ALBUMIN SERPL ELPH-MCNC: 4.3 G/DL — SIGNIFICANT CHANGE UP (ref 3.3–5)
ALP SERPL-CCNC: 89 U/L — SIGNIFICANT CHANGE UP (ref 40–120)
ALT FLD-CCNC: 23 U/L — SIGNIFICANT CHANGE UP (ref 4–41)
AMPHET UR-MCNC: NEGATIVE — SIGNIFICANT CHANGE UP
APAP SERPL-MCNC: < 15 UG/ML — LOW (ref 15–25)
AST SERPL-CCNC: 18 U/L — SIGNIFICANT CHANGE UP (ref 4–40)
BARBITURATES UR SCN-MCNC: NEGATIVE — SIGNIFICANT CHANGE UP
BASOPHILS # BLD AUTO: 0.02 K/UL — SIGNIFICANT CHANGE UP (ref 0–0.2)
BASOPHILS NFR BLD AUTO: 0.2 % — SIGNIFICANT CHANGE UP (ref 0–2)
BENZODIAZ UR-MCNC: NEGATIVE — SIGNIFICANT CHANGE UP
BILIRUB SERPL-MCNC: 0.4 MG/DL — SIGNIFICANT CHANGE UP (ref 0.2–1.2)
BUN SERPL-MCNC: 16 MG/DL — SIGNIFICANT CHANGE UP (ref 7–23)
CALCIUM SERPL-MCNC: 10.3 MG/DL — SIGNIFICANT CHANGE UP (ref 8.4–10.5)
CANNABINOIDS UR-MCNC: POSITIVE — SIGNIFICANT CHANGE UP
CHLORIDE SERPL-SCNC: 103 MMOL/L — SIGNIFICANT CHANGE UP (ref 98–107)
CO2 SERPL-SCNC: 18 MMOL/L — LOW (ref 22–31)
COCAINE METAB.OTHER UR-MCNC: NEGATIVE — SIGNIFICANT CHANGE UP
CREAT SERPL-MCNC: 0.97 MG/DL — SIGNIFICANT CHANGE UP (ref 0.5–1.3)
EOSINOPHIL # BLD AUTO: 0.05 K/UL — SIGNIFICANT CHANGE UP (ref 0–0.5)
EOSINOPHIL NFR BLD AUTO: 0.6 % — SIGNIFICANT CHANGE UP (ref 0–6)
ETHANOL BLD-MCNC: < 10 MG/DL — SIGNIFICANT CHANGE UP
GLUCOSE SERPL-MCNC: 100 MG/DL — HIGH (ref 70–99)
HCT VFR BLD CALC: 41.2 % — SIGNIFICANT CHANGE UP (ref 39–50)
HGB BLD-MCNC: 15 G/DL — SIGNIFICANT CHANGE UP (ref 13–17)
IMM GRANULOCYTES # BLD AUTO: 0.03 # — SIGNIFICANT CHANGE UP
IMM GRANULOCYTES NFR BLD AUTO: 0.4 % — SIGNIFICANT CHANGE UP (ref 0–1.5)
LYMPHOCYTES # BLD AUTO: 1.55 K/UL — SIGNIFICANT CHANGE UP (ref 1–3.3)
LYMPHOCYTES # BLD AUTO: 18.9 % — SIGNIFICANT CHANGE UP (ref 13–44)
MCHC RBC-ENTMCNC: 31 PG — SIGNIFICANT CHANGE UP (ref 27–34)
MCHC RBC-ENTMCNC: 36.4 % — HIGH (ref 32–36)
MCV RBC AUTO: 85.1 FL — SIGNIFICANT CHANGE UP (ref 80–100)
METHADONE UR-MCNC: NEGATIVE — SIGNIFICANT CHANGE UP
MONOCYTES # BLD AUTO: 0.53 K/UL — SIGNIFICANT CHANGE UP (ref 0–0.9)
MONOCYTES NFR BLD AUTO: 6.5 % — SIGNIFICANT CHANGE UP (ref 2–14)
NEUTROPHILS # BLD AUTO: 6 K/UL — SIGNIFICANT CHANGE UP (ref 1.8–7.4)
NEUTROPHILS NFR BLD AUTO: 73.4 % — SIGNIFICANT CHANGE UP (ref 43–77)
NRBC # FLD: 0 — SIGNIFICANT CHANGE UP
OPIATES UR-MCNC: NEGATIVE — SIGNIFICANT CHANGE UP
OXYCODONE UR-MCNC: NEGATIVE — SIGNIFICANT CHANGE UP
PCP UR-MCNC: NEGATIVE — SIGNIFICANT CHANGE UP
PLATELET # BLD AUTO: 155 K/UL — SIGNIFICANT CHANGE UP (ref 150–400)
PMV BLD: 11.7 FL — SIGNIFICANT CHANGE UP (ref 7–13)
POTASSIUM SERPL-MCNC: 3.4 MMOL/L — LOW (ref 3.5–5.3)
POTASSIUM SERPL-SCNC: 3.4 MMOL/L — LOW (ref 3.5–5.3)
PROT SERPL-MCNC: 7.5 G/DL — SIGNIFICANT CHANGE UP (ref 6–8.3)
RBC # BLD: 4.84 M/UL — SIGNIFICANT CHANGE UP (ref 4.2–5.8)
RBC # FLD: 13.6 % — SIGNIFICANT CHANGE UP (ref 10.3–14.5)
SALICYLATES SERPL-MCNC: < 5 MG/DL — LOW (ref 15–30)
SODIUM SERPL-SCNC: 135 MMOL/L — SIGNIFICANT CHANGE UP (ref 135–145)
TSH SERPL-MCNC: 2.73 UIU/ML — SIGNIFICANT CHANGE UP (ref 0.27–4.2)
WBC # BLD: 8.18 K/UL — SIGNIFICANT CHANGE UP (ref 3.8–10.5)
WBC # FLD AUTO: 8.18 K/UL — SIGNIFICANT CHANGE UP (ref 3.8–10.5)

## 2018-06-24 PROCEDURE — 99285 EMERGENCY DEPT VISIT HI MDM: CPT | Mod: 25

## 2018-06-24 PROCEDURE — 90792 PSYCH DIAG EVAL W/MED SRVCS: CPT | Mod: GC

## 2018-06-24 PROCEDURE — 93010 ELECTROCARDIOGRAM REPORT: CPT

## 2018-06-24 RX ADMIN — Medication 1 MILLIGRAM(S): at 09:16

## 2018-06-24 NOTE — ED BEHAVIORAL HEALTH ASSESSMENT NOTE - PSYCHIATRIC ISSUES AND PLAN (INCLUDE STANDING AND PRN MEDICATION)
Increase VPA to 500mg bid, decrease buspar to 10mg bid, switch zoloft to AM dosing, decrease Geodon to 40mg at bedtime x 1 with plan for inpatient team to evaluate dosing/choice of medication, continue prazosin 1mg q hs,

## 2018-06-24 NOTE — ED BEHAVIORAL HEALTH ASSESSMENT NOTE - DIFFERENTIAL
Mood disorder Mood disorder  adjustment d/o Mood disorder  adjustment disorder  cannabis use disorder  substance induced mood d/o

## 2018-06-24 NOTE — ED ADULT TRIAGE NOTE - CHIEF COMPLAINT QUOTE
Pt arrives to ED via EMS fir shortness of breath.  Pt not in respiratory distress in triage,  Pt saturation 100% on room air.  Pt has hx of COPD. Pt arrives to ED via EMS for shortness of breath.  Pt not in respiratory distress in triage,  Pt saturation 100% on room air.  Upon interview, pt reports he is feeling SI.  Pt reports he took a number of Oxy pills two days ago with intent to harm himself.  FIT doctor called to evaluate pt.
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
## 2018-06-24 NOTE — ED ADULT NURSE NOTE - OBJECTIVE STATEMENT
pt states that his roommate called EMS, initially because he was having SOB due to CHF, pt states in the ambuilance he told them he was feeling suicidal. pt states his breathing feels better now, pt states that yesterday he had 20 percocet in attempt to kill himself. pt states he still feels suicidal but does not have a plan. pt calm and appears anxious. blood work sent.
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
## 2018-06-24 NOTE — ED PROVIDER NOTE - PMH
Diabetes    Hypertension, unspecified type    Paranoia    Uncomplicated asthma, unspecified asthma severity

## 2018-06-24 NOTE — ED BEHAVIORAL HEALTH ASSESSMENT NOTE - DESCRIPTION
anxious, cooperative    Vital Signs Last 24 Hrs  T(C): 36.4 (24 Jun 2018 08:01), Max: 36.4 (24 Jun 2018 08:01)  T(F): 97.5 (24 Jun 2018 08:01), Max: 97.5 (24 Jun 2018 08:01)  HR: 88 (24 Jun 2018 08:01) (88 - 88)  BP: 148/97 (24 Jun 2018 08:01) (148/97 - 148/97)  BP(mean): --  RR: 18 (24 Jun 2018 08:01) (18 - 18)  SpO2: 100% (24 Jun 2018 08:01) (100% - 100%) Patient is domiciled at [facility] HTN, Asthma, DM, sleep apnea

## 2018-06-24 NOTE — ED BEHAVIORAL HEALTH ASSESSMENT NOTE - PATIENT'S CHIEF COMPLAINT
I was feeling bad yestrday and took some extra perceocets. I was feeling bad yesterday and took some extra percocet.

## 2018-06-24 NOTE — ED BEHAVIORAL HEALTH ASSESSMENT NOTE - OTHER
suspects his wife is cheating on him pacing [facility] mental health program - 3 [facility] sepreated [facility] health program - 3 roommates

## 2018-06-24 NOTE — ED PROVIDER NOTE - MEDICAL DECISION MAKING DETAILS
51 y/o male with history of HTN, DM, and Schizophrenia with depression, anxiety, and SI. Concern for SI. Psych eval.

## 2018-06-24 NOTE — ED BEHAVIORAL HEALTH ASSESSMENT NOTE - CASE SUMMARY
Agree with the assessment and plan as outline above. Pt denies any suicidal ideation or suicide attempt as well as denying any active homicidal ideation, auditory/visual hallucinations, or frederick. Pt is future oriented in wanting to spend time with friends and party which he states got him in trouble with his brother. Pt also state that he looking to get a new job.   Pt given referral for crisis clinic which patient accepted   Pt was offered VOL admission which he decline and he currently doesn't meet criteria for 9.39 admission.

## 2018-06-24 NOTE — ED PROVIDER NOTE - OBJECTIVE STATEMENT
53 y/o male with PMHx of HTN, DM, and Schizophrenia presents to ED for depression and SI X 1 day. Pt states having increased depressed thoughts since yesterday and took 20 Oxycodone. Pt admits to SI. Pt denies any fever, chills, nausea, vomiting, SOB, chest pain, or abd pain. Pt denies any sleepiness and admits instead to anxiety.
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
## 2018-06-24 NOTE — ED BEHAVIORAL HEALTH ASSESSMENT NOTE - OTHER PAST PSYCHIATRIC HISTORY (INCLUDE DETAILS REGARDING ONSET, COURSE OF ILLNESS, INPATIENT/OUTPATIENT TREATMENT)
Patient sat in long term from 6741-2639 and received his mental health services there. Hospitalized in 1991 for an a suicide attempt of drinking bleach. In 2008 patient was hospitalized for one day at [facility] for non compliance with medication.

## 2018-06-24 NOTE — ED ADULT NURSE NOTE - CHIEF COMPLAINT QUOTE
Pt arrives to ED via EMS for shortness of breath.  Pt not in respiratory distress in triage,  Pt saturation 100% on room air.  Upon interview, pt reports he is feeling SI.  Pt reports he took a number of Oxy pills two days ago with intent to harm himself.  FIT doctor called to evaluate pt.
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
## 2018-06-24 NOTE — ED BEHAVIORAL HEALTH ASSESSMENT NOTE - CURRENT MEDICATION
Geodon 120mg q AM and 80mg q HS, prozac 20mg tid 100mg BID, gabapentin 300 Q AM, Depakote 500 mg Q HS, Buspar 15 mg BID, Metformin 500 mg BID, Prazosin 1mg q HS, Artane 2mg BID, Lisinopril does unknown, Spiriva Q AM, Symbicort BID, Albuterol PRN Depakote 500mg bid, prozac 20mg tid 100mg BID, gabapentin 300 Q AM, Depakote 500 mg Q HS, Buspar 15 mg BID, Metformin 500 mg BID, Prazosin 1mg q HS, Artane 2mg BID, Lisinopril does unknown, Spiriva Q AM, Symbicort BID, Albuterol PRN

## 2019-03-14 ENCOUNTER — EMERGENCY (EMERGENCY)
Facility: HOSPITAL | Age: 54
LOS: 1 days | Discharge: HOME | End: 2019-03-14
Attending: EMERGENCY MEDICINE | Admitting: EMERGENCY MEDICINE
Payer: MEDICAID

## 2019-03-14 VITALS
HEART RATE: 68 BPM | TEMPERATURE: 98 F | DIASTOLIC BLOOD PRESSURE: 62 MMHG | OXYGEN SATURATION: 100 % | RESPIRATION RATE: 16 BRPM | SYSTOLIC BLOOD PRESSURE: 114 MMHG

## 2019-03-14 VITALS
OXYGEN SATURATION: 98 % | SYSTOLIC BLOOD PRESSURE: 142 MMHG | RESPIRATION RATE: 18 BRPM | DIASTOLIC BLOOD PRESSURE: 87 MMHG | TEMPERATURE: 98 F | HEART RATE: 64 BPM

## 2019-03-14 DIAGNOSIS — Z90.49 ACQUIRED ABSENCE OF OTHER SPECIFIED PARTS OF DIGESTIVE TRACT: Chronic | ICD-10-CM

## 2019-03-14 DIAGNOSIS — F10.929 ALCOHOL USE, UNSPECIFIED WITH INTOXICATION, UNSPECIFIED: ICD-10-CM

## 2019-03-14 DIAGNOSIS — R69 ILLNESS, UNSPECIFIED: ICD-10-CM

## 2019-03-14 DIAGNOSIS — F25.1 SCHIZOAFFECTIVE DISORDER, DEPRESSIVE TYPE: ICD-10-CM

## 2019-03-14 PROBLEM — I10 ESSENTIAL (PRIMARY) HYPERTENSION: Chronic | Status: ACTIVE | Noted: 2018-06-24

## 2019-03-14 LAB
ALBUMIN SERPL ELPH-MCNC: 4.3 G/DL — SIGNIFICANT CHANGE UP (ref 3.3–5)
ALP SERPL-CCNC: 86 U/L — SIGNIFICANT CHANGE UP (ref 40–120)
ALT FLD-CCNC: 15 U/L — SIGNIFICANT CHANGE UP (ref 4–41)
AMMONIA BLD-MCNC: 22 UMOL/L — SIGNIFICANT CHANGE UP (ref 11–55)
AMPHET UR-MCNC: NEGATIVE — SIGNIFICANT CHANGE UP
ANION GAP SERPL CALC-SCNC: 12 MMO/L — SIGNIFICANT CHANGE UP (ref 7–14)
APAP SERPL-MCNC: < 15 UG/ML — LOW (ref 15–25)
AST SERPL-CCNC: 16 U/L — SIGNIFICANT CHANGE UP (ref 4–40)
BARBITURATES UR SCN-MCNC: NEGATIVE — SIGNIFICANT CHANGE UP
BASOPHILS # BLD AUTO: 0.02 K/UL — SIGNIFICANT CHANGE UP (ref 0–0.2)
BASOPHILS NFR BLD AUTO: 0.2 % — SIGNIFICANT CHANGE UP (ref 0–2)
BENZODIAZ UR-MCNC: NEGATIVE — SIGNIFICANT CHANGE UP
BILIRUB SERPL-MCNC: < 0.2 MG/DL — LOW (ref 0.2–1.2)
BUN SERPL-MCNC: 13 MG/DL — SIGNIFICANT CHANGE UP (ref 7–23)
CALCIUM SERPL-MCNC: 10.7 MG/DL — HIGH (ref 8.4–10.5)
CANNABINOIDS UR-MCNC: POSITIVE — SIGNIFICANT CHANGE UP
CHLORIDE SERPL-SCNC: 104 MMOL/L — SIGNIFICANT CHANGE UP (ref 98–107)
CO2 SERPL-SCNC: 25 MMOL/L — SIGNIFICANT CHANGE UP (ref 22–31)
COCAINE METAB.OTHER UR-MCNC: NEGATIVE — SIGNIFICANT CHANGE UP
CREAT SERPL-MCNC: 0.93 MG/DL — SIGNIFICANT CHANGE UP (ref 0.5–1.3)
EOSINOPHIL # BLD AUTO: 0.03 K/UL — SIGNIFICANT CHANGE UP (ref 0–0.5)
EOSINOPHIL NFR BLD AUTO: 0.3 % — SIGNIFICANT CHANGE UP (ref 0–6)
ETHANOL BLD-MCNC: < 10 MG/DL — SIGNIFICANT CHANGE UP
GLUCOSE SERPL-MCNC: 107 MG/DL — HIGH (ref 70–99)
HCT VFR BLD CALC: 44.1 % — SIGNIFICANT CHANGE UP (ref 39–50)
HGB BLD-MCNC: 14.4 G/DL — SIGNIFICANT CHANGE UP (ref 13–17)
IMM GRANULOCYTES NFR BLD AUTO: 0.2 % — SIGNIFICANT CHANGE UP (ref 0–1.5)
LYMPHOCYTES # BLD AUTO: 1.48 K/UL — SIGNIFICANT CHANGE UP (ref 1–3.3)
LYMPHOCYTES # BLD AUTO: 17 % — SIGNIFICANT CHANGE UP (ref 13–44)
MAGNESIUM SERPL-MCNC: 2.4 MG/DL — SIGNIFICANT CHANGE UP (ref 1.6–2.6)
MCHC RBC-ENTMCNC: 30.8 PG — SIGNIFICANT CHANGE UP (ref 27–34)
MCHC RBC-ENTMCNC: 32.7 % — SIGNIFICANT CHANGE UP (ref 32–36)
MCV RBC AUTO: 94.4 FL — SIGNIFICANT CHANGE UP (ref 80–100)
METHADONE UR-MCNC: NEGATIVE — SIGNIFICANT CHANGE UP
MONOCYTES # BLD AUTO: 0.4 K/UL — SIGNIFICANT CHANGE UP (ref 0–0.9)
MONOCYTES NFR BLD AUTO: 4.6 % — SIGNIFICANT CHANGE UP (ref 2–14)
NEUTROPHILS # BLD AUTO: 6.75 K/UL — SIGNIFICANT CHANGE UP (ref 1.8–7.4)
NEUTROPHILS NFR BLD AUTO: 77.7 % — HIGH (ref 43–77)
NRBC # FLD: 0 K/UL — LOW (ref 25–125)
OPIATES UR-MCNC: NEGATIVE — SIGNIFICANT CHANGE UP
OXYCODONE UR-MCNC: NEGATIVE — SIGNIFICANT CHANGE UP
PCP UR-MCNC: NEGATIVE — SIGNIFICANT CHANGE UP
PHOSPHATE SERPL-MCNC: 2.9 MG/DL — SIGNIFICANT CHANGE UP (ref 2.5–4.5)
PLATELET # BLD AUTO: 189 K/UL — SIGNIFICANT CHANGE UP (ref 150–400)
PMV BLD: 11.2 FL — SIGNIFICANT CHANGE UP (ref 7–13)
POTASSIUM SERPL-MCNC: 5.1 MMOL/L — SIGNIFICANT CHANGE UP (ref 3.5–5.3)
POTASSIUM SERPL-SCNC: 5.1 MMOL/L — SIGNIFICANT CHANGE UP (ref 3.5–5.3)
PROT SERPL-MCNC: 7.3 G/DL — SIGNIFICANT CHANGE UP (ref 6–8.3)
RBC # BLD: 4.67 M/UL — SIGNIFICANT CHANGE UP (ref 4.2–5.8)
RBC # FLD: 13.6 % — SIGNIFICANT CHANGE UP (ref 10.3–14.5)
SALICYLATES SERPL-MCNC: < 5 MG/DL — LOW (ref 15–30)
SODIUM SERPL-SCNC: 141 MMOL/L — SIGNIFICANT CHANGE UP (ref 135–145)
VALPROATE SERPL-MCNC: 109.2 UG/ML — HIGH (ref 50–100)
VALPROATE SERPL-MCNC: 93 UG/ML — SIGNIFICANT CHANGE UP (ref 50–100)
WBC # BLD: 8.7 K/UL — SIGNIFICANT CHANGE UP (ref 3.8–10.5)
WBC # FLD AUTO: 8.7 K/UL — SIGNIFICANT CHANGE UP (ref 3.8–10.5)

## 2019-03-14 PROCEDURE — 90792 PSYCH DIAG EVAL W/MED SRVCS: CPT

## 2019-03-14 PROCEDURE — 99285 EMERGENCY DEPT VISIT HI MDM: CPT | Mod: 25

## 2019-03-14 PROCEDURE — 93010 ELECTROCARDIOGRAM REPORT: CPT

## 2019-03-14 RX ORDER — SODIUM CHLORIDE 9 MG/ML
2000 INJECTION, SOLUTION INTRAVENOUS ONCE
Qty: 0 | Refills: 0 | Status: COMPLETED | OUTPATIENT
Start: 2019-03-14 | End: 2019-03-14

## 2019-03-14 RX ADMIN — SODIUM CHLORIDE 2000 MILLILITER(S): 9 INJECTION, SOLUTION INTRAVENOUS at 05:12
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
## 2019-03-14 NOTE — ED BEHAVIORAL HEALTH ASSESSMENT NOTE - OTHER PAST PSYCHIATRIC HISTORY (INCLUDE DETAILS REGARDING ONSET, COURSE OF ILLNESS, INPATIENT/OUTPATIENT TREATMENT)
Patient sat in senior living from 2454-3395 and received his mental health services there. Hospitalized in 1991 for an a suicide attempt of drinking bleach. In 2008 patient was hospitalized for one day at [facility] for non compliance with medication. History of Schizoaffective, two inpatient hospitalizations at [facility] and [facility] - last admission was in April 2018 for a week on account of homicidal ideations towards his wife, one prior suicide attempt by drinking bleach (medical admission). Attends [employer] in [place] [facility].

## 2019-03-14 NOTE — ED PROVIDER NOTE - PHYSICAL EXAMINATION
*Gen: lying in bed, appears intoxicated, easily arousable and can follow directions, AAO*0  *HEENT: NC/AT, pupils 6mm and reactive to light bilaterally, dry mucous membranes, airway patent, trachea midline  *CV: RRR, S1/S2 present, no murmurs/rubs/gallops  *Resp: no respiratory distress, LCTAB, no wheezing/rales/rhonchi  *Abd: non-distended, soft N/Tx4, no guarding or rigidity  *Neuro: unable to assess  *Extremities: no gross deformity  *Skin: no rashes, no wounds   ~ Clay King M.D. *Gen: lying in bed, appears intoxicated, easily arousable and can follow directions, AAO*0  *HEENT: NC/AT, pupils 6mm and reactive to light bilaterally, dry mucous membranes, airway patent, trachea midline  *CV: RRR, S1/S2 present, no murmurs/rubs/gallops  *Resp: no respiratory distress, LCTAB, no wheezing/rales/rhonchi  *Abd: non-distended, soft N/Tx4, no guarding or rigidity  *Neuro: unable to assess  *Extremities: no gross deformity  *Skin: no rashes, no wounds   ~ Clay Prasadfish: unable to perform full neuro exam 2/2 compliance. no LE edema, normal equal distal pulses. No clonus, rigidity, tremors, fasciculations. PERRL, Normal bowel sounds, skin temp/color. Sleeping, easily arosable to verbal stimuli but then falls right back asleep.

## 2019-03-14 NOTE — ED BEHAVIORAL HEALTH ASSESSMENT NOTE - DESCRIPTION (FIRST USE, LAST USE, QUANTITY, FREQUENCY, DURATION)
"every 1-2 days" occasional use- last drank 2 cups of liquor last night 2x week- last smoked last weekend history per therapist; none current

## 2019-03-14 NOTE — ED ADULT NURSE REASSESSMENT NOTE - NS ED NURSE REASSESS COMMENT FT1
pt is transferred to , report given to YUDITH Johnson. piv d/c'ed, pt agreeable to plan.
Received patient in BH, pt calm and cooperative. endorsed binge drinking. denies SI,HI&AH. safety and comfort measures maintained. will continue to monitor

## 2019-03-14 NOTE — ED PROVIDER NOTE - CARE PLAN
Principal Discharge DX:	Intoxication by drug  Assessment and plan of treatment:	Thank you for visiting our Emergency Department, it has been a pleasure taking part in your healthcare.    Your discharge diagnosis is: intoxication   Please take all discharge medications as indicated below:  Please continue all medications as rx'd by your PMD.  Please follow up with your PMD within x48 hours.  A copy of resulted labs, imaging, and findings have been provided to you.   You have had a detailed discussion with your provider regarding your diagnosis, care management and discharge planning including, but not limited to: return precautions, follow up visits with existing or new providers, new prescriptions and/or medication changes, wound and/or splint/cast care or other care   aspects specific to your diagnosis and treatment. You have been given the opportunity to have your questions answered. At this time you have been deemed stable and fit for discharge.  Return precautions to the Emergency Department include but are not limited to: unrelenting nausea, vomiting, fever, chills, chest pain, shortness of breath, dizziness, chest or abdominal pain, worsening back pain, syncope, blood in urine or stool, headache that doesn't resolve, numbness or tingling, loss of sensation, loss of motor function, or any other concerning symptoms.  Please follow up with Psychiatry within x48 hours.

## 2019-03-14 NOTE — ED BEHAVIORAL HEALTH ASSESSMENT NOTE - SUICIDE PROTECTIVE FACTORS
Identifies reasons for living Supportive social network or family/Identifies reasons for living/Responsibility to family and others/Positive therapeutic relationships Identifies reasons for living/Future oriented/Positive therapeutic relationships/Responsibility to family and others/Supportive social network or family

## 2019-03-14 NOTE — ED BEHAVIORAL HEALTH ASSESSMENT NOTE - CURRENT MEDICATION
Called [facility] and verified medication [phone]-     Geodon 20mg daily, Geodon 80mg BID, Prozac 60mg Daily, Cogentin 1mg Daily, Depakote 1000mg QHS, Lisinopril 5mg Daily, Incruse Ellipta 62.5mg 1 puff QAM

## 2019-03-14 NOTE — ED BEHAVIORAL HEALTH ASSESSMENT NOTE - PROFESSIONAL COLLATERAL RELATIONSHIP
therapist - message lest [facility]/ [facility]//Supervisor at [facility]

## 2019-03-14 NOTE — ED PROVIDER NOTE - NSFOLLOWUPINSTRUCTIONS_ED_ALL_ED_FT
Thank you for visiting our Emergency Department, it has been a pleasure taking part in your healthcare.    Your discharge diagnosis is: intoxication   Please take all discharge medications as indicated below:  Please continue all medications as rx'd by your PMD.  Please follow up with your PMD within x48 hours.  A copy of resulted labs, imaging, and findings have been provided to you.   You have had a detailed discussion with your provider regarding your diagnosis, care management and discharge planning including, but not limited to: return precautions, follow up visits with existing or new providers, new prescriptions and/or medication changes, wound and/or splint/cast care or other care   aspects specific to your diagnosis and treatment. You have been given the opportunity to have your questions answered. At this time you have been deemed stable and fit for discharge.  Return precautions to the Emergency Department include but are not limited to: unrelenting nausea, vomiting, fever, chills, chest pain, shortness of breath, dizziness, chest or abdominal pain, worsening back pain, syncope, blood in urine or stool, headache that doesn't resolve, numbness or tingling, loss of sensation, loss of motor function, or any other concerning symptoms.  Please follow up with Psychiatry within x48 hours.
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
## 2019-03-14 NOTE — ED PROVIDER NOTE - PROGRESS NOTE DETAILS
Fernando HARKINS: Tried to contact patient's home phone number; dialing phone number says cannot accept calls. Fernando HARKINS: Patient reassessed; AAAOx2; improving mental status but still appears sedated.  Spoke with toxicology; recommend getting another Depakote as well as a ammonia level. Klepfish: Elevated depakote. other labs grossly wnl. Pt much more awake. slight slurred speech. cant remember all details admits to marijuana and taking too much depakote bec "I wanted to quit." Cannot recall when he ingested. awaiting repeat leve, ammonia, will reassess. Nino PGY2: Pt assessed at beside. Pt resting comfortably, pain controlled, pt questions answered. Vital signs stable. Appears clinically sober, reports took handful of medications in SI attempt, took depakote lisinopril cogentin prozac 7pm, discussed with tox, based on repeat labwork and normal neuro exam pt is medically cleared, pt assessed at bedside, pleasant, cooperative, endorses SI, neuro exam non focal, discussed with psych will transfer to  for further eval. Nino PGY2: discussed with psych, per psych pt okay to dc - pt reports only wanted to get more intoxicated that was reason why decided to take handful of medications. Will d/c with PMD f/u, strict return precautions given with read back per pt/family/caregiver. psych fu.

## 2019-03-14 NOTE — ED PROVIDER NOTE - CLINICAL SUMMARY MEDICAL DECISION MAKING FREE TEXT BOX
53 year-old male with history of HTN, DM, schizophrenia/bipolar presents to the Emergency Department for overdose; unable to provide significant history and collateral phone number is not accepting calls.  Will check labs, tox screen, valproic acid levels and observe.  Patient placed on end-tidal CO2 for further monitoring.

## 2019-03-14 NOTE — ED ADULT NURSE NOTE - CHIEF COMPLAINT QUOTE
BIBEMS from home s/p drinking a "bottle of alcohol and taking 8 divalproex." Patient is only responsive to painful stimuli at present, breathing even and unlabored. Will wake up momentarily and then fall right back to sleep. CN aware, will room patient. EKG in progress. no medical PMH obtained aside from Bipolar disorder.
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
## 2019-03-14 NOTE — ED BEHAVIORAL HEALTH ASSESSMENT NOTE - SAFETY PLAN DETAILS
call 911 or return to ER if symptoms worsen Extensive safety planning performed with patient. In addition to extensive discussion of safe places patient can go to, distraction techniques, coping skills, motivational interviewing and who patient can call in the event of crisis including various hotlines. Patient agreeing verbally to return patient to ER or call 911 if symptoms worsen or patient has urges to harm self or others.

## 2019-03-14 NOTE — ED BEHAVIORAL HEALTH NOTE - BEHAVIORAL HEALTH NOTE
Worker spoke to supervisor Elizabeth Roxie from the [facility] ([phone]) in [place] for collateral information. All information is as per Ms. Faustin:    Patient is a 53 year old male, domiciled at an apartment program at the [facility], with a diagnosis of schizoaffective disorder, BIBEMS activated from patient’s home. Ms. Faustin states that the patient was supposed to follow up on Tuesday but rescheduled his appointment for today with Nilda Ordaz. She states that the patient Ms. Faustin states that the patient was last seen for therapy on March 5 and at that visit he stated that he had trouble sleeping but his mood was stable. Ms. Faustin states that the patient has a history of alcohol and cocaine abuse. Ms. Faustin states that the patient was denying that he had SI/HI thoughts at the appointment on Tuesday. Ms. Faustin states that the patient has been compliant with going to treatment. She states that the patient has high blood pressure, diabetes, and high blood pressure.  She states that the patient chronically has paranoia. She states that the patient has had a past SA years ago when he had taken pills but was not hospitalized for this. Ms. Faustin states that the patient has a follow up appointment today at 2:30pm with her. Patient is cleared psychiatrically and medically. Worker called [facility] ([phone]) to arrange transport to the [address], [address]. Worker spoke to Vinny at [facility] who states that [facility] ([phone]) will transport patient. Worker called [facility] who states ETA 2:00PM trip #[phone] Worker spoke to supervisor Elizabeth Maravillaadis from the [facility] ([phone]) in [place] for collateral information. All information is as per Ms. Faustin:    Patient is a 53 year old male, domiciled at an apartment program at the [facility], with a diagnosis of schizoaffective disorder, BIBEMS activated from patient’s home. Ms. Faustin states that the patient was supposed to follow up on Tuesday but rescheduled his appointment for today with Nilda Ordaz. She states that the patient Ms. Faustin states that the patient was last seen for therapy on March 5 and at that visit he stated that he had trouble sleeping but his mood was stable. Ms. Faustin states that the patient has a history of alcohol and cocaine abuse. Ms. Faustin states that the patient was denying that he had SI/HI thoughts at the appointment on Tuesday. Ms. Faustin states that the patient has been compliant with going to treatment. She states that the patient has high blood pressure, diabetes, and high blood pressure.  She states that the patient chronically has paranoia. She states that the patient has had a past SA years ago when he had taken pills but was not hospitalized for this. Ms. Faustin states that the patient has a follow up appointment today at 2:30pm with her. Patient is cleared psychiatrically and medically. Worker called [facility] ([phone]) to arrange transport to the [address], [address]. Worker spoke to Vinny at [facility] who states that [facility] ([phone]) will transport patient. Worker called [facility] who states ETA 2:00PM trip #[id number]    Worker spoke to Ms. Faustin and informed of discharge and informed that taxi will be running later. Worker called [facility] who states that  will be 2:30pm. Worker called and informed Ms. Faustin who states patient can come for 3:00pm today. Worker spoke to supervisor Elizabeth Faustin from the [facility] ([phone]) in [place] for collateral information. All information is as per Ms. Faustin:    Patient is a 53 year old male, domiciled at an apartment program at the [facility], with a diagnosis of schizoaffective disorder, BIBEMS activated from patient’s home. Ms. Faustin states that the patient was supposed to follow up on Tuesday but rescheduled his appointment for today with Nilda Ordaz. She states that the patient Ms. Faustin states that the patient was last seen for therapy on March 5 and at that visit he stated that he had trouble sleeping but his mood was stable. Ms. Faustin states that the patient has a history of alcohol and cocaine abuse. Ms. Faustin states that the patient was denying that he had SI/HI thoughts at the appointment on Tuesday. Ms. Faustin states that the patient has been compliant with going to treatment. She states that the patient has high blood pressure, diabetes, and high blood pressure.  She states that the patient chronically has paranoia. She states that the patient has had a past SA years ago when he had taken pills but was not hospitalized for this. Ms. Faustin states that the patient has a follow up appointment today at 2:30pm with her. Patient is cleared psychiatrically and medically. Worker called [facility] ([phone]) to arrange transport to the [address], [address]. Worker spoke to Vinny at [facility] who states that [facility] ([phone]) will transport patient. Worker called [facility] who states ETA 2:00PM trip #[phone]    Worker spoke to Ms. Faustin and informed of discharge and informed that taxi will be running later. Worker called [facility] who states that  will be 2:30pm. Worker called and informed Ms. Faustin who states patient can come for 3:00pm today. Called  Debi Ch[facility] ([phone])- and informed of patient's discharge. Worker spoke to supervisor Elizabeth Faustin from the [facility] ([phone]) in [place] for collateral information. All information is as per Ms. Faustin:    Patient is a 53 year old male, domiciled at an apartment program at the [facility], with a diagnosis of schizoaffective disorder, BIBEMS activated from patient’s home. Ms. Faustin states that the patient was supposed to follow up on Tuesday but rescheduled his appointment for today with Nilda Ordaz. She states that the patient Ms. Faustin states that the patient was last seen for therapy on March 5 and at that visit he stated that he had trouble sleeping but his mood was stable. Ms. Faustin states that the patient has a history of alcohol and cocaine abuse. Ms. Faustin states that the patient was denying that he had SI/HI thoughts at the appointment on Tuesday. Ms. Faustin states that the patient has been compliant with going to treatment. She states that the patient has high blood pressure, diabetes, and high blood pressure.  She states that the patient chronically has paranoia. She states that the patient has had a past SA years ago when he had taken pills but was not hospitalized for this. Ms. Faustin states that the patient has a follow up appointment today at 2:30pm with her. Patient is cleared psychiatrically and medically. Worker called [facility] ([phone]) to arrange transport to the [address], [address]. Worker spoke to Vinny at [facility] who states that [facility] ([phone]) will transport patient. Worker called [facility] who states ETA 2:00PM trip #[phone]    Worker spoke to Ms. Faustin and informed of discharge and informed that taxi will be running later. Worker called [facility] who states that  will be 2:30pm. Worker called and informed Ms. Faustin who states patient can come for 3:00pm today. Called  Debi Chavez ([phone])- and informed of patient's discharge.        has spoken Ms. Faustin  confirmed discharge time 3:30pm and faxed discharge summary to [phone] because patient is a part of an apartment program. Worker spoke to supervisor Elizabeth Faustin from the [facility] ([phone]) in [place] for collateral information. All information is as per Ms. Faustin:    Patient is a 53 year old male, domiciled at an apartment program at the [facility], with a diagnosis of schizoaffective disorder, BIBEMS activated from patient’s home. Ms. Faustin states that the patient was supposed to follow up on Tuesday but rescheduled his appointment for today with Nilda Ordaz. She states that the patient Ms. Faustin states that the patient was last seen for therapy on March 5 and at that visit he stated that he had trouble sleeping but his mood was stable. Ms. Faustin states that the patient has a history of alcohol and cocaine abuse. Ms. Faustin states that the patient was denying that he had SI/HI thoughts at the appointment on Tuesday. Ms. Faustin states that the patient has been compliant with going to treatment. She states that the patient has high blood pressure, diabetes, and high blood pressure.  She states that the patient chronically has paranoia. She states that the patient has had a past SA years ago when he had taken pills but was not hospitalized for this. Ms. Faustin states that the patient has a follow up appointment today at 2:30pm with her. Patient is cleared psychiatrically and medically. Worker called [facility] ([phone]) to arrange transport to the [address], [address]. Worker spoke to Vinny at [facility] who states that [facility] ([phone]) will transport patient. Worker called [facility] who states ETA 2:00PM trip #[phone]    Worker spoke to Ms. Faustin and informed of discharge and informed that taxi will be running later. Worker called [facility] who states that  will be 2:30pm. Worker called and informed Ms. Faustin who states patient can come for 3:00pm today. Called  Debi Chavez ([phone])- and informed of patient's discharge.        has spoken Ms. Faustin  confirmed discharge time 3:30pm and faxed discharge summary to [phone] because patient is a part of an apartment program. Worker spoke to supervisor Elizabeth Faustin from the [facility] ([phone]) in [place] for collateral information. All information is as per Ms. Faustin:    Patient is a 53 year old male, domiciled at an apartment program at the [facility], with a diagnosis of schizoaffective disorder, BIBEMS activated from patient’s home. Ms. Faustin states that the patient was supposed to follow up on Tuesday but rescheduled his appointment for today with Nilda Ordaz. She states that the patient Ms. Faustin states that the patient was last seen for therapy on March 5 and at that visit he stated that he had trouble sleeping but his mood was stable. Ms. Faustin states that the patient has a history of alcohol and cocaine abuse. Ms. Faustin states that the patient was denying that he had SI/HI thoughts at the appointment on Tuesday. Ms. Faustin states that the patient has been compliant with going to treatment. She states that the patient has high blood pressure, diabetes, and high blood pressure.  She states that the patient chronically has paranoia. She states that the patient has had a past SA years ago when he had taken pills but was not hospitalized for this. Ms. Faustin states that the patient has a follow up appointment today at 2:30pm with her. Patient is cleared psychiatrically and medically. Worker called [facility] ([phone]) to arrange transport to the [address], [address]. Worker spoke to Vinny at [facility] who states that [facility] ([phone]) will transport patient. Worker called [facility] who states ETA 2:00PM trip #[phone]    Worker spoke to Ms. Faustin and informed of discharge and informed that taxi will be running later. Worker called [facility] who states that  will be 2:30pm. Worker called and informed Ms. Faustin who states patient can come for 3:00pm today. Called  [facility]i Ch[facility] ([phone])- and informed of patient's discharge.        has spoken Ms. Faustin  confirmed discharge time 3:30pm and faxed discharge summary to [phone] because patient is a part of an apartment program. Worker received a call from medical social worker Ninoska Kaplan who states that patient did not go into the cab. Worker called MsMichelle Maravillaadis who states that the patient can come for an appointment at 10:30am tomorrow. Worker met with patient who agreed to follow up tomorrow for appointment. Worker called back [facility]  ([phone]) and arranged for transport to take patient back to his home residence. Worker spoke to supervisor Elizabeth Faustin from the [facility] ([phone]) in [place] for collateral information. All information is as per Ms. Faustin:    Patient is a 53 year old male, domiciled at an apartment program at the [facility], with a diagnosis of schizoaffective disorder, BIBEMS activated from patient’s home. Ms. Faustin states that the patient was supposed to follow up on Tuesday but rescheduled his appointment for today with Nilda Ordaz. She states that the patient Ms. Faustin states that the patient was last seen for therapy on March 5 and at that visit he stated that he had trouble sleeping but his mood was stable. Ms. Faustin states that the patient has a history of alcohol and cocaine abuse. Ms. Faustin states that the patient was denying that he had SI/HI thoughts at the appointment on Tuesday. Ms. Faustin states that the patient has been compliant with going to treatment. She states that the patient has high blood pressure, diabetes, and high blood pressure.  She states that the patient chronically has paranoia. She states that the patient has had a past SA years ago when he had taken pills but was not hospitalized for this. Ms. Faustin states that the patient has a follow up appointment today at 2:30pm with her. Patient is cleared psychiatrically and medically. Worker called [facility] ([phone]) to arrange transport to the [address], [address]. Worker spoke to Vinny at [facility] who states that [facility] ([phone]) will transport patient. Worker called [facility] who states ETA 2:00PM trip #[phone]    Worker spoke to Ms. Faustin and informed of discharge and informed that taxi will be running later. Worker called [facility] who states that  will be 2:30pm. Worker called and informed Ms. Faustin who states patient can come for 3:00pm today. Called  Debi Chavez ([phone])- and informed of patient's discharge.        has spoken Ms. Faustin  confirmed discharge time 3:30pm and faxed discharge summary to [phone] because patient is a part of an apartment program. Worker received a call from medical social worker Ninoska Kaplan who states that patient did not go into the cab. Worker called Ms. Faustin who states that the patient can come for an appointment at 10:30am tomorrow. Worker met with patient who agreed to follow up tomorrow for appointment. Worker called back [facility]  ([phone]) and arranged for transport to take patient back to his home residence of [address] [place] [address], [facility]. Worker was provided with trip #[phone] with [employer] [phone].

## 2019-03-14 NOTE — ED BEHAVIORAL HEALTH ASSESSMENT NOTE - REFERRAL / APPOINTMENT DETAILS
Follow up with therapist and primary psychiatrist at [facility] 6/25/18 Follow up with therapist and primary psychiatrist at [facility] today at 2:30PM 3/14/19 Follow up with therapist and primary psychiatrist at [facility] tomorrow 3/15/19 at 10:30AM.

## 2019-03-14 NOTE — ED BEHAVIORAL HEALTH ASSESSMENT NOTE - DESCRIPTION
anxious, cooperative    Vital Signs Last 24 Hrs  T(C): 36.4 (24 Jun 2018 08:01), Max: 36.4 (24 Jun 2018 08:01)  T(F): 97.5 (24 Jun 2018 08:01), Max: 97.5 (24 Jun 2018 08:01)  HR: 88 (24 Jun 2018 08:01) (88 - 88)  BP: 148/97 (24 Jun 2018 08:01) (148/97 - 148/97)  BP(mean): --  RR: 18 (24 Jun 2018 08:01) (18 - 18)  SpO2: 100% (24 Jun 2018 08:01) (100% - 100%) HTN, Asthma, DM, sleep apnea Patient is domiciled at [facility] During course of ED visit patient was calm and cooperative. Patient was not aggressive or violent and did not require PRN medications.    Vital Signs Last 24 Hrs  T(C): 36.9 (14 Mar 2019 10:28), Max: 36.9 (14 Mar 2019 03:30)  T(F): 98.4 (14 Mar 2019 10:28), Max: 98.4 (14 Mar 2019 03:30)  HR: 68 (14 Mar 2019 10:28) (64 - 68)  BP: 114/62 (14 Mar 2019 10:28) (114/62 - 176/106)  BP(mean): --  RR: 16 (14 Mar 2019 10:28) (16 - 19)  SpO2: 100% (14 Mar 2019 10:28) (98% - 100%) HTN, Asthma, DM2, sleep apnea, COPD Patient lives with 3 roommates via [facility] Patient lives with 3 roommates via [employer]

## 2019-03-14 NOTE — ED PROVIDER NOTE - ATTENDING CONTRIBUTION TO CARE
53M PMH HTN, DM, schizophrenia p/w soncenr for etoh intox and valproic acid ingestion. Unable to obtain any additional collateral. Vitals wnl, exam as above. Lethargic but arousable and clinically in no reps distress.  ddx: Likely intox, possible OD, possible SI.   CBC, cmp, tox, valproic acid, IVF/supporitve care, 1:1, reassess.

## 2019-03-14 NOTE — ED BEHAVIORAL HEALTH ASSESSMENT NOTE - SUICIDE RISK FACTORS
Mood episode/Highly impulsive behavior/Agitation/severe anxiety/History of abuse/trauma/Substance abuse/dependence Highly impulsive behavior/History of abuse/trauma/Substance abuse/dependence

## 2019-03-14 NOTE — ED BEHAVIORAL HEALTH NOTE - BEHAVIORAL HEALTH NOTE
Spoke with [facility]  Jacqueline Benjamin from [facility] ([phone])-  Lucita visited the apartment Tuesday and everything was fine. The patient at baseline enjoys drinking and it has to been addressed but she has no record of patient having an on going alcohol abuse issue. Patient generally is compliant with medication "and is a good client." Spoke with [facility]  Jacqueline Benjamin from [facility] ([phone])-  Lucita visited the apartment Tuesday and everything was fine. The patient at baseline enjoys drinking and it has to been addressed but she has no record of patient having an on going alcohol abuse issue. Patient generally is compliant with medication "and is a good client."    Called  Lucita ([phone])- left message requesting call back. Spoke with [facility]  Jacqueline Benjamin from [facility] ([phone])-  Debi visited the apartment Tuesday and everything was fine. The patient at baseline enjoys drinking and it has to been addressed but she has no record of patient having an on going alcohol abuse issue. Patient generally is compliant with medication "and is a good client."    Called  Debi Chavez ([phone])- She reports she went to the apartment and none of the roommates told her patient went to the ER. His roommates said the patient had an appt. She last saw the patient Last Thursday and patient was fine. She reports the patient did appear "more sleepy" than usual but patient has COPD so she said sometimes he has trouble sleeping. Patient does drink occasionally. Patient does have a history of suicidal thoughts but is very open when patient has these thoughts. Patient has been doing well in outpatient treatment. She reports she believes patient's report that he was likely drinking/taking medication to get drunk. Patient's next visit will be Tuesday. Spoke with [facility]  Jacqueline Benjamin from [facility] ([phone])-  Debi visited the apartment Tuesday and everything was fine. The patient at baseline enjoys drinking and it has to been addressed but she has no record of patient having an on going alcohol abuse issue. Patient generally is compliant with medication "and is a good client."    Called  Debi Chavez ([phone])- She reports she went to the apartment and none of the roommates told her patient went to the ER. His roommates said the patient had an appt. The roommates typically will cover for each other. She last saw the patient Last Thursday and patient was fine. She reports the patient did appear "more sleepy" than usual but patient has COPD so she said sometimes he has trouble sleeping. Patient does drink occasionally. Patient does have a history of suicidal thoughts but is very open when patient has these thoughts. Patient has been doing well in outpatient treatment. She reports she believes patient's report that he was likely drinking/taking medication to get drunk/high. Patient's next visit will be Tuesday. She has no safety concerns.

## 2019-03-14 NOTE — ED PROVIDER NOTE - OBJECTIVE STATEMENT
53 year-old male with history of HTN, DM, schizophrenia/bipolar presents to the Emergency Department for overdose.  Patient was brought in by ambulance after being found drinking a "bottle of alcohol and taking 8 divalproex" - patient reports drinking alcohol and taking medications but unable to provide any additional history. 53 year-old male with history of HTN, DM, schizophrenia/bipolar presents to the Emergency Department for overdose.  Patient was brought in by ambulance after being found drinking a "bottle of alcohol and taking 8 divalproex" - patient reports drinking alcohol and taking medications but unable to provide any additional history.  Reports wanting to hurt himself; patient otherwise intoxicated. 53 year-old male with history of HTN, DM, schizophrenia/bipolar presents to the Emergency Department for overdose.  Patient was brought in by ambulance after being found drinking a "bottle of alcohol and taking 8 divalproex" - patient reports drinking alcohol and taking medications but unable to provide any additional history.  Reports wanting to hurt himself; patient otherwise intoxicated.  [place]: hx obtained from prior charts and triage note. EMS report or EMS not available at time of eval. 53M PMH HTN, DM, schizophrenia p/w "BIBEMS from home s/p drinking a "bottle of alcohol and taking 8 divalproex." Patient is only responsive to painful stimuli at present, breathing even and unlabored. Will wake up momentarily and then fall right back to sleep." Pt unable to provide any additional info. We are unable to obtain any additional collateral.

## 2019-03-14 NOTE — ED ADULT NURSE NOTE - OBJECTIVE STATEMENT
Pt received to room 15 awake and responsive to verbal.  Pt was brought in via EMS for Drinking a bottle of alcohol and taking 8 divalproex. Pt addmits to drinking alcohol and taking pills but unable to say how much and how many. Pt states he has SI and that he feels like this time to time.  Pt denies any HI or any visual or auditory hallucinations. Pt unwilling to answer any questions. Belonging placed infront of 21 and valuables places in security. 20g iv placed to right wrist. Labs drawn and sent. fluids given as per order. Pt on cardiac monitor in NSR.

## 2019-04-22 ENCOUNTER — INPATIENT (INPATIENT)
Facility: HOSPITAL | Age: 54
LOS: 10 days | Discharge: PSYCHIATRIC OR SUBSTANCE USE FACILITY | End: 2019-05-03
Attending: INTERNAL MEDICINE | Admitting: INTERNAL MEDICINE
Payer: MEDICAID

## 2019-04-22 VITALS
TEMPERATURE: 98 F | RESPIRATION RATE: 16 BRPM | OXYGEN SATURATION: 100 % | DIASTOLIC BLOOD PRESSURE: 92 MMHG | HEART RATE: 54 BPM | SYSTOLIC BLOOD PRESSURE: 155 MMHG

## 2019-04-22 DIAGNOSIS — Z90.49 ACQUIRED ABSENCE OF OTHER SPECIFIED PARTS OF DIGESTIVE TRACT: Chronic | ICD-10-CM

## 2019-04-22 LAB
BASE EXCESS BLDV CALC-SCNC: 0.8 MMOL/L — SIGNIFICANT CHANGE UP
BASOPHILS # BLD AUTO: 0.04 K/UL — SIGNIFICANT CHANGE UP (ref 0–0.2)
BASOPHILS NFR BLD AUTO: 0.7 % — SIGNIFICANT CHANGE UP (ref 0–2)
BLOOD GAS VENOUS - CREATININE: 0.9 MG/DL — SIGNIFICANT CHANGE UP (ref 0.5–1.3)
CHLORIDE BLDV-SCNC: 109 MMOL/L — HIGH (ref 96–108)
EOSINOPHIL # BLD AUTO: 0.13 K/UL — SIGNIFICANT CHANGE UP (ref 0–0.5)
EOSINOPHIL NFR BLD AUTO: 2.1 % — SIGNIFICANT CHANGE UP (ref 0–6)
GAS PNL BLDV: 138 MMOL/L — SIGNIFICANT CHANGE UP (ref 136–146)
GLUCOSE BLDV-MCNC: 89 — SIGNIFICANT CHANGE UP (ref 70–99)
HCO3 BLDV-SCNC: 25 MMOL/L — SIGNIFICANT CHANGE UP (ref 20–27)
HCT VFR BLD CALC: 38.1 % — LOW (ref 39–50)
HCT VFR BLDV CALC: 40.6 % — SIGNIFICANT CHANGE UP (ref 39–51)
HGB BLD-MCNC: 12.6 G/DL — LOW (ref 13–17)
HGB BLDV-MCNC: 13.2 G/DL — SIGNIFICANT CHANGE UP (ref 13–17)
IMM GRANULOCYTES NFR BLD AUTO: 0.3 % — SIGNIFICANT CHANGE UP (ref 0–1.5)
LACTATE BLDV-MCNC: 1 MMOL/L — SIGNIFICANT CHANGE UP (ref 0.5–2)
LYMPHOCYTES # BLD AUTO: 1.57 K/UL — SIGNIFICANT CHANGE UP (ref 1–3.3)
LYMPHOCYTES # BLD AUTO: 25.7 % — SIGNIFICANT CHANGE UP (ref 13–44)
MCHC RBC-ENTMCNC: 30.7 PG — SIGNIFICANT CHANGE UP (ref 27–34)
MCHC RBC-ENTMCNC: 33.1 % — SIGNIFICANT CHANGE UP (ref 32–36)
MCV RBC AUTO: 92.7 FL — SIGNIFICANT CHANGE UP (ref 80–100)
MONOCYTES # BLD AUTO: 0.5 K/UL — SIGNIFICANT CHANGE UP (ref 0–0.9)
MONOCYTES NFR BLD AUTO: 8.2 % — SIGNIFICANT CHANGE UP (ref 2–14)
NEUTROPHILS # BLD AUTO: 3.84 K/UL — SIGNIFICANT CHANGE UP (ref 1.8–7.4)
NEUTROPHILS NFR BLD AUTO: 63 % — SIGNIFICANT CHANGE UP (ref 43–77)
NRBC # FLD: 0 K/UL — SIGNIFICANT CHANGE UP (ref 0–0)
PCO2 BLDV: 40 MMHG — LOW (ref 41–51)
PH BLDV: 7.41 PH — SIGNIFICANT CHANGE UP (ref 7.32–7.43)
PLATELET # BLD AUTO: 181 K/UL — SIGNIFICANT CHANGE UP (ref 150–400)
PMV BLD: 11.4 FL — SIGNIFICANT CHANGE UP (ref 7–13)
PO2 BLDV: 77 MMHG — HIGH (ref 35–40)
POTASSIUM BLDV-SCNC: 3.7 MMOL/L — SIGNIFICANT CHANGE UP (ref 3.4–4.5)
RBC # BLD: 4.11 M/UL — LOW (ref 4.2–5.8)
RBC # FLD: 14.3 % — SIGNIFICANT CHANGE UP (ref 10.3–14.5)
SAO2 % BLDV: 95.8 % — HIGH (ref 60–85)
WBC # BLD: 6.1 K/UL — SIGNIFICANT CHANGE UP (ref 3.8–10.5)
WBC # FLD AUTO: 6.1 K/UL — SIGNIFICANT CHANGE UP (ref 3.8–10.5)

## 2019-04-22 PROCEDURE — 70450 CT HEAD/BRAIN W/O DYE: CPT | Mod: 26

## 2019-04-22 NOTE — ED ADULT TRIAGE NOTE - ESI TRIAGE ACUITY LEVEL, MLM
2 Duration Of Freeze Thaw-Cycle (Seconds): 0 Render Post-Care Instructions In Note?: no Consent: The patient's consent was obtained including but not limited to risks of crusting, scabbing, blistering, scarring, darker or lighter pigmentary change, recurrence, incomplete removal and infection. Number Of Freeze-Thaw Cycles: 3 freeze-thaw cycles Detail Level: Simple Post-Care Instructions: I reviewed with the patient in detail post-care instructions. Patient is to wear sunprotection, and avoid picking at any of the treated lesions. Pt may apply Vaseline to crusted or scabbing areas.
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
## 2019-04-22 NOTE — ED PROVIDER NOTE - CLINICAL SUMMARY MEDICAL DECISION MAKING FREE TEXT BOX
Male comes in with suicidal ideation and attempt, overdosing oxycodone 7.5 mg (prescribed for chronic sciatica pain) and visual hallucinations. Protecting airway and maintaining normal respirations. Will check psych clearance labs, will recheck acetaminophen, EKG, utox, 1:1 observation, consult psych.

## 2019-04-22 NOTE — ED PROVIDER NOTE - PHYSICAL EXAMINATION
PHYSICAL EXAM:  GENERAL: alert, conversant, only oriented to person, answers questions cooperatively   HEAD:  Atraumatic, Normocephalic  EYES: pupisl dilated, 8 mm to 5 mm in dark room, EOMI, no nystagmis   ENMT: No tonsillar erythema, exudates, or enlargement; Moist mucous membranes  NECK: Supple, No JVD, Normal thyroid  HEART: kaylee, regular, normal rhythm, no murmurs   RESPIRATORY: CTA B/L, No W/R/R  ABDOMEN: Soft, Nontender, Nondistended; bowel sounds present   NEUROLOGY: oriented to person only, moving all 4 extremities, ambulatory   EXTREMITIES:  2+ Peripheral Pulses, No clubbing, cyanosis, or edema  SKIN: warm, dry, normal color

## 2019-04-22 NOTE — ED PROVIDER NOTE - PROGRESS NOTE DETAILS
Ivonnertjacky PGY1- kaylee persisted, BP better, now 180/100, did peak at 200/110. CTH neg, tox neg other than oxycodone, paged tox for consult, recommend admission for tele, no need for antidote for any of agents reported in overdose ( pt now endorsnig taking geodon, meloxicam, and "a lot of gabapentin", admitted to Dr. Maryan Sosa on tele. Haverty PGY1- kaylee persisted, BP better, now 180/100, did peak at 200/110. CTH neg, tox neg other than oxycodone, EKG initialy and on repeat showed sinus kaylee, intervals unremarkable, paged tox for consult, recommend admission for tele, no need for antidote for any of agents reported in overdose ( pt now endorsnig taking geodon, meloxicam, and "a lot of gabapentin"), admitted to Dr. Maryan Sosa on tele.

## 2019-04-22 NOTE — ED ADULT TRIAGE NOTE - CHIEF COMPLAINT QUOTE
pt BIBA from home, pt took "7-8 oxycodone tablets today".  pt "thinks he might be suicidal".  pt is lethargic in triage

## 2019-04-22 NOTE — ED PROVIDER NOTE - OBJECTIVE STATEMENT
53 yoM, PMHx of HTN, non compliant, unknown pscyh d/o on Geodon and possibly other meds, chronic back pain on Oxy 7.5 mg tabs, presents to Ed with report of suicidal attempt. Took 7-8 tabs of his oxy before arriving to ED by EMS. Unclear when the time frame was. Denies any other drug use or ingestion. No recent illness. Prior suicide attempt in 1991. Denies cp, abd pain, n/v, sob, headache, or diaphoresis presently. Only oriented to person.

## 2019-04-23 DIAGNOSIS — J44.9 CHRONIC OBSTRUCTIVE PULMONARY DISEASE, UNSPECIFIED: ICD-10-CM

## 2019-04-23 DIAGNOSIS — T50.902A POISONING BY UNSPECIFIED DRUGS, MEDICAMENTS AND BIOLOGICAL SUBSTANCES, INTENTIONAL SELF-HARM, INITIAL ENCOUNTER: ICD-10-CM

## 2019-04-23 DIAGNOSIS — R42 DIZZINESS AND GIDDINESS: ICD-10-CM

## 2019-04-23 DIAGNOSIS — R00.1 BRADYCARDIA, UNSPECIFIED: ICD-10-CM

## 2019-04-23 DIAGNOSIS — Z29.9 ENCOUNTER FOR PROPHYLACTIC MEASURES, UNSPECIFIED: ICD-10-CM

## 2019-04-23 DIAGNOSIS — G47.33 OBSTRUCTIVE SLEEP APNEA (ADULT) (PEDIATRIC): ICD-10-CM

## 2019-04-23 DIAGNOSIS — I10 ESSENTIAL (PRIMARY) HYPERTENSION: ICD-10-CM

## 2019-04-23 DIAGNOSIS — R07.9 CHEST PAIN, UNSPECIFIED: ICD-10-CM

## 2019-04-23 DIAGNOSIS — F25.0 SCHIZOAFFECTIVE DISORDER, BIPOLAR TYPE: ICD-10-CM

## 2019-04-23 LAB
ALBUMIN SERPL ELPH-MCNC: 3.8 G/DL — SIGNIFICANT CHANGE UP (ref 3.3–5)
ALBUMIN SERPL ELPH-MCNC: 3.9 G/DL — SIGNIFICANT CHANGE UP (ref 3.3–5)
ALP SERPL-CCNC: 77 U/L — SIGNIFICANT CHANGE UP (ref 40–120)
ALP SERPL-CCNC: 83 U/L — SIGNIFICANT CHANGE UP (ref 40–120)
ALT FLD-CCNC: 16 U/L — SIGNIFICANT CHANGE UP (ref 4–41)
ALT FLD-CCNC: 17 U/L — SIGNIFICANT CHANGE UP (ref 4–41)
AMMONIA BLD-MCNC: 50 UMOL/L — SIGNIFICANT CHANGE UP (ref 11–55)
AMPHET UR-MCNC: NEGATIVE — SIGNIFICANT CHANGE UP
ANION GAP SERPL CALC-SCNC: 12 MMO/L — SIGNIFICANT CHANGE UP (ref 7–14)
ANION GAP SERPL CALC-SCNC: 12 MMO/L — SIGNIFICANT CHANGE UP (ref 7–14)
APAP SERPL-MCNC: < 15 UG/ML — LOW (ref 15–25)
AST SERPL-CCNC: 13 U/L — SIGNIFICANT CHANGE UP (ref 4–40)
AST SERPL-CCNC: 17 U/L — SIGNIFICANT CHANGE UP (ref 4–40)
BARBITURATES UR SCN-MCNC: NEGATIVE — SIGNIFICANT CHANGE UP
BENZODIAZ UR-MCNC: NEGATIVE — SIGNIFICANT CHANGE UP
BILIRUB SERPL-MCNC: 0.3 MG/DL — SIGNIFICANT CHANGE UP (ref 0.2–1.2)
BILIRUB SERPL-MCNC: 0.3 MG/DL — SIGNIFICANT CHANGE UP (ref 0.2–1.2)
BUN SERPL-MCNC: 18 MG/DL — SIGNIFICANT CHANGE UP (ref 7–23)
BUN SERPL-MCNC: 18 MG/DL — SIGNIFICANT CHANGE UP (ref 7–23)
CALCIUM SERPL-MCNC: 9.4 MG/DL — SIGNIFICANT CHANGE UP (ref 8.4–10.5)
CALCIUM SERPL-MCNC: 9.7 MG/DL — SIGNIFICANT CHANGE UP (ref 8.4–10.5)
CANNABINOIDS UR-MCNC: POSITIVE — SIGNIFICANT CHANGE UP
CHLORIDE SERPL-SCNC: 104 MMOL/L — SIGNIFICANT CHANGE UP (ref 98–107)
CHLORIDE SERPL-SCNC: 104 MMOL/L — SIGNIFICANT CHANGE UP (ref 98–107)
CO2 SERPL-SCNC: 21 MMOL/L — LOW (ref 22–31)
CO2 SERPL-SCNC: 23 MMOL/L — SIGNIFICANT CHANGE UP (ref 22–31)
COCAINE METAB.OTHER UR-MCNC: NEGATIVE — SIGNIFICANT CHANGE UP
CREAT SERPL-MCNC: 0.97 MG/DL — SIGNIFICANT CHANGE UP (ref 0.5–1.3)
CREAT SERPL-MCNC: 1.04 MG/DL — SIGNIFICANT CHANGE UP (ref 0.5–1.3)
ETHANOL BLD-MCNC: < 10 MG/DL — SIGNIFICANT CHANGE UP
GLUCOSE SERPL-MCNC: 82 MG/DL — SIGNIFICANT CHANGE UP (ref 70–99)
GLUCOSE SERPL-MCNC: 96 MG/DL — SIGNIFICANT CHANGE UP (ref 70–99)
MAGNESIUM SERPL-MCNC: 1.9 MG/DL — SIGNIFICANT CHANGE UP (ref 1.6–2.6)
METHADONE UR-MCNC: NEGATIVE — SIGNIFICANT CHANGE UP
OPIATES UR-MCNC: NEGATIVE — SIGNIFICANT CHANGE UP
OXYCODONE UR-MCNC: POSITIVE — HIGH
PCP UR-MCNC: NEGATIVE — SIGNIFICANT CHANGE UP
PHOSPHATE SERPL-MCNC: 3.1 MG/DL — SIGNIFICANT CHANGE UP (ref 2.5–4.5)
POTASSIUM SERPL-MCNC: 3.6 MMOL/L — SIGNIFICANT CHANGE UP (ref 3.5–5.3)
POTASSIUM SERPL-MCNC: 4 MMOL/L — SIGNIFICANT CHANGE UP (ref 3.5–5.3)
POTASSIUM SERPL-SCNC: 3.6 MMOL/L — SIGNIFICANT CHANGE UP (ref 3.5–5.3)
POTASSIUM SERPL-SCNC: 4 MMOL/L — SIGNIFICANT CHANGE UP (ref 3.5–5.3)
PROT SERPL-MCNC: 6.3 G/DL — SIGNIFICANT CHANGE UP (ref 6–8.3)
PROT SERPL-MCNC: 6.4 G/DL — SIGNIFICANT CHANGE UP (ref 6–8.3)
SALICYLATES SERPL-MCNC: < 5 MG/DL — LOW (ref 15–30)
SODIUM SERPL-SCNC: 137 MMOL/L — SIGNIFICANT CHANGE UP (ref 135–145)
SODIUM SERPL-SCNC: 139 MMOL/L — SIGNIFICANT CHANGE UP (ref 135–145)
TROPONIN T, HIGH SENSITIVITY: 18 NG/L — SIGNIFICANT CHANGE UP (ref ?–14)
TROPONIN T, HIGH SENSITIVITY: 19 NG/L — SIGNIFICANT CHANGE UP (ref ?–14)
TSH SERPL-MCNC: 2.33 UIU/ML — SIGNIFICANT CHANGE UP (ref 0.27–4.2)
VALPROATE SERPL-MCNC: 4.1 UG/ML — LOW (ref 50–100)

## 2019-04-23 PROCEDURE — 99223 1ST HOSP IP/OBS HIGH 75: CPT | Mod: AI

## 2019-04-23 PROCEDURE — 70450 CT HEAD/BRAIN W/O DYE: CPT | Mod: 26

## 2019-04-23 PROCEDURE — 71046 X-RAY EXAM CHEST 2 VIEWS: CPT | Mod: 26

## 2019-04-23 PROCEDURE — 99223 1ST HOSP IP/OBS HIGH 75: CPT

## 2019-04-23 RX ORDER — ALBUTEROL 90 UG/1
1 AEROSOL, METERED ORAL ONCE
Qty: 0 | Refills: 0 | Status: COMPLETED | OUTPATIENT
Start: 2019-04-23 | End: 2019-04-23

## 2019-04-23 RX ORDER — SODIUM CHLORIDE 9 MG/ML
1000 INJECTION, SOLUTION INTRAVENOUS
Qty: 0 | Refills: 0 | Status: DISCONTINUED | OUTPATIENT
Start: 2019-04-23 | End: 2019-04-28

## 2019-04-23 RX ORDER — LISINOPRIL 2.5 MG/1
5 TABLET ORAL DAILY
Qty: 0 | Refills: 0 | Status: DISCONTINUED | OUTPATIENT
Start: 2019-04-23 | End: 2019-05-03

## 2019-04-23 RX ORDER — HEPARIN SODIUM 5000 [USP'U]/ML
5000 INJECTION INTRAVENOUS; SUBCUTANEOUS EVERY 8 HOURS
Qty: 0 | Refills: 0 | Status: DISCONTINUED | OUTPATIENT
Start: 2019-04-23 | End: 2019-05-03

## 2019-04-23 RX ADMIN — SODIUM CHLORIDE 80 MILLILITER(S): 9 INJECTION, SOLUTION INTRAVENOUS at 14:22

## 2019-04-23 RX ADMIN — LISINOPRIL 5 MILLIGRAM(S): 2.5 TABLET ORAL at 20:41

## 2019-04-23 RX ADMIN — Medication 2 MILLIGRAM(S): at 22:29

## 2019-04-23 RX ADMIN — SODIUM CHLORIDE 80 MILLILITER(S): 9 INJECTION, SOLUTION INTRAVENOUS at 20:41

## 2019-04-23 RX ADMIN — HEPARIN SODIUM 5000 UNIT(S): 5000 INJECTION INTRAVENOUS; SUBCUTANEOUS at 22:29

## 2019-04-23 RX ADMIN — Medication 2 MILLIGRAM(S): at 23:30

## 2019-04-23 RX ADMIN — ALBUTEROL 1 PUFF(S): 90 AEROSOL, METERED ORAL at 07:21

## 2019-04-23 NOTE — BEHAVIORAL HEALTH ASSESSMENT NOTE - NSBHCHARTREVIEWLAB_PSY_A_CORE FT
12.6   6.10  )-----------( 181      ( 22 Apr 2019 21:45 )             38.1   04-22    139  |  104  |  18  ----------------------------<  82  4.0   |  23  |  0.97    Ca    9.4      22 Apr 2019 21:45    TPro  6.4  /  Alb  3.9  /  TBili  0.3  /  DBili  x   /  AST  17  /  ALT  17  /  AlkPhos  77  04-22

## 2019-04-23 NOTE — ED ADULT NURSE NOTE - INTERVENTIONS DEFINITIONS
Reinforce activity limits and safety measures with patient and family/Instruct patient to call for assistance/Monitor for mental status changes and reorient to person, place, and time/Non-slip footwear when patient is off stretcher/Physically safe environment: no spills, clutter or unnecessary equipment/Stretcher in lowest position, wheels locked, appropriate side rails in place
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
## 2019-04-23 NOTE — CONSULT NOTE ADULT - PROBLEM SELECTOR RECOMMENDATION 9
Patient is not presenting with any specific toxidrome currently.  CNS depression can be seen with Oxycodone, Gabapentin, Ziprasidone, and Valproic Acid.  ED reported patient was only alert to person last night and may have been altered secondary to any of the medications listed above.  However, patient is alert to person and place at the moment.  Unsure of patient's baseline mental status.  Patient does not know where he currently resides but reports his psychiatrist is at the [facility].  Previous  note lists a phone number  for Elizabeth Carrasco ([phone]) which I attempted to reach but could not get a hold of anyone.      Oxycodone or an opioid will present with CNS depression and possibly respiratory depression.  Patient's pupils were dilated upon presentation to ED which is inconsistent with opioid toxicity.  Gabapentin and Ziprasidone can also present with CNS depression but patient does not appear somnolent and has clear speech at the moment.  Valproic acid toxicity can also present with altered mental status.  Patient does not remember if he took this medication but recent hospital visits showed positive Valproic Acid levels.  Ammonia can be elevated in valproate toxicity.    Would recommend sending Valproic acid level and ammonia level.  Treatment is otherwise supportive - monitoring and observation.

## 2019-04-23 NOTE — CONSULT NOTE ADULT - SUBJECTIVE AND OBJECTIVE BOX
MEDICAL TOXICOLOGY CONSULT    HPI:  53M PMH schizoaffective disorder, HTN, and chronic back pain presented to ED last night after an overdose.  Patient states he overdosed on multiple medications in an attempt to hurt himself.  Patient does not know quantity ingested of other medications but states he is certain he took 8 tablets of oxycodone.  Patient states he ingested an unknown quantity of Geodon, Gabapentin, and Meloxicam.  Patient reports he has been diagnosed with HTN but has never been prescribed or taken any antihypertensives.  Patient states he told someone at his group home and he was brought to the hospital.  Patient does not know name of group home.  Patient reports dizziness but denies nausea, vomiting, chest pain, abdominal pain, fevers, chills, or dyspnea.  Patient reports suicidal ideation but denies homicidal ideation or auditory/visual hallucinations.    Patient was noted to be bradycardic in the 40s in the ED, altered and subsequently admitted.      ONSET / TIME of exposure(s): approximately 5pm, 19    QUANTITY of exposure(s): unknown quantity of Oxycodone, Meloxicam, Ziprasidone, Gabapentin    ROUTE of exposure:  ingestion    CONTEXT of exposure: at [facility] [place]    ASSOCIATED symptoms: dizziness    PAST MEDICAL & SURGICAL HISTORY:  Diabetes  Hypertension, unspecified type  Paranoia  Uncomplicated asthma, unspecified asthma severity  S/P appendectomy        MEDICATION HISTORY:  Geodon  Gabapentin  Meloxicam  Oxycodone  ?Valproic Acid      RECREATIONAL / ETHANOL / SUPPLEMENT USE: (+) 1ppd smoker, (+) ETOH vodka every other day, (+) marijuana    SOCIAL Hx:  lives at group home    REVIEW OF SYSTEMS:     General:  no fever, chills, malaise, change in weight or fatigue  Eyes:  no blurry vision, double vision, or diminished acuity  ENT:  no tinnitus, decreased acuity, epistaxis, sore throat, dysphagia  Cardiac: no chest pain, syncope, or palpitations  Lung:  no cough, shortness of breath, stridor, or wheezing  GI:  no abdominal pain, nausea, vomiting, diarrhea, or constipation  Genitourinary: no dysuria, hematuria, or incontinence  Ortho: no joint pain, swelling, myalgias, atrophy, or spasm  Skin: no rash, lesions, or pruritis  Neuro: reports dizziness,  no headache, weakness/numbness, ataxia, change in speech, tremor, or seizure  Psych: reports depression, reports anxiety, denies frederick, reports suicidal, denies homicidal  Endocrine: no polydypsia, polyuria, heat/cold intolerance  Hematologic: no bleeding, bruising, petechiae, or adenopathy  Immune:  no rhinitis, atopy, immunocompromise, HIV, or cancer    PHYSICAL EXAM  Vital Signs Last 24 Hrs  T(C): 36.2 (2019 04:37), Max: 36.4 (2019 19:56)  T(F): 97.2 (2019 04:37), Max: 97.6 (2019 19:56)  HR: 48 (2019 08:41) (43 - 54)  BP: 189/98 (2019 08:41) (155/92 - 189/98)  BP(mean): --  RR: 16 (2019 08:41) (13 - 16)  SpO2: 100% (2019 08:41) (100% - 100%)  General:  awake and alert, lying on strecher  Head:  normocephalic & atraumatic  Eyes:  extra-occular movement is intact  Pupils:  3 mm, symmetric, reactive to light  Ear, nose, throat:  mucosa is moist  Neck:  supple  Respiratory:  normal effort, clear to auscultation bilaterally, no rales/ronchi/wheezing  Cardiac:  rate is bradycardic regular rhythm, no rubs/murmurs/gallops  Abdomen:  Soft, nondistended, nontender, +bowel sounds, no organomegaly  :  deferred  Skin:  warm and dry  Neurologic:  no Clonus, no Tremor, Reflexes are normal, extremities are supple, cranial nerves II-XII intact  Psychiatric:  alert and oriented to person and place    SIGNIFICANT LABORATORY STUDIES:                        12.6   6.10  )-----------( 181      ( 2019 21:45 )             38.1       -    139  |  104  |  18  ----------------------------<  82  4.0   |  23  |  0.97    Ca    9.4      2019 21:45    TPro  6.4  /  Alb  3.9  /  TBili  0.3  /  DBili  x   /  AST  17  /  ALT  17  /  AlkPhos  77                Anion Gap: --  @ 22:04  CK: --  @ 22:04  Troponin:  --   22:04  Pro-BNP:  --   22:04  VB<H>   @ 22:04  Carboxyhemoglobin %:  --   22:04  Methemoglobin %:  --   @ 22:04  Osmolality Serum:  --   22:04  Aspirin Level: --   22:04  Acetaminophen Level:  --   22:04  Ethanol Level:  --   22:04  Digoxin Level:  --   22:04  Phenytoin Level:  --   22:04  Carbamazepine level:  --  :04  Lamotrigine level:  --   @ :04  Anion Gap:  21:45  CK: --  21:45  Troponin:  --   21:45  Pro-BNP:  --   21:45  VBG:  --  :45  Carboxyhemoglobin %:  --   21:45  Methemoglobin %:  --   21:45  Osmolality Serum:  --   21:45  Aspirin Level: < 5.0<L>   21:45  Acetaminophen Level:  < 15.0<L>   21:45  Ethanol Level:  < 10   21:45  Digoxin Level:  --   21:45  Phenytoin Level:  --   21:45  Carbamazepine level:  --   21:45  Lamotrigine level:  --   21:45

## 2019-04-23 NOTE — H&P ADULT - PROBLEM SELECTOR PLAN 1
Admit to telemetry  Serial EKGs; IV fluids; Monitor HR Admit to telemetry, Serial EKGs; IV fluids; Toxicology c/s in chart, call Cardio c/s if no improvement. Check MD note
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
## 2019-04-23 NOTE — BEHAVIORAL HEALTH ASSESSMENT NOTE - CASE SUMMARY
Patient seen and evaluated, agrees with above assessment and plan, pt. AAOX3, presenting with serious SA, he overdosed on multiple medications. As per pt. he took 6 pills of Percocet, in addition took x6 pills of gabapentin and numerous other home medications (takes Geodon, Cogentin, Depakote, Prozac,).  Pt presenting with depressed mood, anhedonia (not enjoying TV), low energy (missing appointments), impaired concentration, increased feelings of guilt/hopelessness, and thoughts of death which led to current serious suicide attempt. Given h/o drinking, recommend CIWA and Ativan PRN as per CIWA.   HOLD ALL psychiatric medications due to current OD. Check VPA level and Ammonia level. Patient cannot leave AMA, COS 1:1, will need inpatient psychiatric admission once he is medically optimized, will follow

## 2019-04-23 NOTE — H&P ADULT - NEGATIVE NEUROLOGICAL SYMPTOMS
no generalized seizures/no focal seizures/no facial palsy/no tremors/no headache/no weakness/no vertigo/no loss of sensation/no hemiparesis/no confusion/no paresthesias

## 2019-04-23 NOTE — BEHAVIORAL HEALTH ASSESSMENT NOTE - NSBHCHARTREVIEWVS_PSY_A_CORE FT
Vital Signs Last 24 Hrs  T(C): 36.7 (23 Apr 2019 12:20), Max: 36.7 (23 Apr 2019 12:20)  T(F): 98 (23 Apr 2019 12:20), Max: 98 (23 Apr 2019 12:20)  HR: 52 (23 Apr 2019 12:20) (43 - 54)  BP: 163/87 (23 Apr 2019 12:20) (155/92 - 189/98)  BP(mean): --  RR: 17 (23 Apr 2019 12:20) (13 - 17)  SpO2: 99% (23 Apr 2019 12:20) (99% - 100%)

## 2019-04-23 NOTE — H&P ADULT - PROBLEM SELECTOR PLAN 4
Continue ziprasidone, trihexyphenidyl, sertraline  Get depakote level before continuing depakote   Psych consult   consult Repeat Head CT, first CT limited

## 2019-04-23 NOTE — ED ADULT NURSE NOTE - OBJECTIVE STATEMENT
Pt arrived to room 14 and immediately assessed by Doctors.  Pt taken to CT with RN at bedside.  When pt returned, pt placed on CO with PCA at bedside.  Pt reports suicidal ideation.  Pt belongings checked and secured by staff.  Pt changed into gown.  Pt reports taking "7-8 oxycodone tablets today".  Pt is lethargic.  Pt labs drawn and sent by float RN.  Iv access obtained by eliceo RN.  Pt Sinus Luis Antonio on cardiac monitor.  Zoll monitor placed on pt.

## 2019-04-23 NOTE — H&P ADULT - PROBLEM SELECTOR PLAN 5
Started on albuterol PRN  Monitor Pulse-OX Get depakote level before continuing depakote   Psych consult called   consult
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
## 2019-04-23 NOTE — H&P ADULT - RS GEN PE MLT RESP DETAILS PC
clear to auscultation bilaterally/good air movement/normal/no chest wall tenderness/respirations non-labored/airway patent

## 2019-04-23 NOTE — ED ADULT NURSE REASSESSMENT NOTE - NS ED NURSE REASSESS COMMENT FT1
Pt reported he needs his home inhaler.  MAR notified.  Pt not in respiratory distress.  Pt able to speak in complete sentences and saturating Oxygen WNL as per pulse oximetry.  Pt medicated as per EMAR.

## 2019-04-23 NOTE — H&P ADULT - CONSTITUTIONAL DETAILS
well-nourished/well-groomed/distress due to pain/well-developed well-groomed/well-nourished/no distress/well-developed

## 2019-04-23 NOTE — CONSULT NOTE ADULT - PROBLEM SELECTOR RECOMMENDATION 2
During evaluation, patient's HR noted to be in the mid 50s.  Previous EKGs showed HR in mid 60s.  Could not find EKG in chart during current admission.  Spoke with inpatient team who will order an EKG.  Ziprasidone can cause QTc prolongation but does not typically present with bradycardia.  Oxycodone, Gabapentin, and Meloxicam also do not typically present with bradycardia.  Would monitor on telemetry.  If patient continues to be bradycardic may benefit from cardiology consultation to determine if bradycardia is intrinsic.    Please call toxicology with any questions at [phone].

## 2019-04-23 NOTE — H&P ADULT - NSHPSOCIALHISTORY_GEN_ALL_CORE
Pt is single with no children. Pt is on disability. Pt currently lives at a mental health agency ([facility]) with a roommate. Pt admits to drinking EtOH once or twice a week. Pt drinks 4loko or 2 tall glass of vodka. Pt has been smoking 1ppd since high school. Pt also occasionally smokes marijuana. Pt did not get a prostate exam. Pt doesn't get vaccines.

## 2019-04-23 NOTE — BEHAVIORAL HEALTH ASSESSMENT NOTE - NSBHADMITCOUNSEL_PSY_A_CORE
importance of adherence to chosen treatment/client/family/caregiver education/risks and benefits of treatment options
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
## 2019-04-23 NOTE — BEHAVIORAL HEALTH ASSESSMENT NOTE - RISK ASSESSMENT
Patient protective factors include- no suicidal thoughts/plan/intent, no homicidal thoughts, no frederick/hypomania, no psychosis, not symptoms of depression, no current legal issues, no recent aggression/violence, stable living situation, outpatient treatment, medication/treatment compliance, future oriented, able to safety plan, community living supports and calm/cooperative throughout evaluation.     Risk factors chronic- history of legal issues, history of aggression/violence, history of substance use, history of inpatient admissions, history of 1 past suicide attempt in 1991. Patient risk factors mitigated by therapeutic interaction in ER, current sobriety, upcoming appointment with therapist at 2:30PM 3/14/19, safety plan with motivational interviewing, medication compliance with ample outpatient treatment/community supports. Patient protective factors include- no suicidal thoughts/plan/intent, no homicidal thoughts, no frederick/hypomania, no psychosis, not symptoms of depression, no current legal issues, no recent aggression/violence, stable living situation, outpatient treatment, medication/treatment compliance, future oriented, able to safety plan, community living supports and calm/cooperative throughout evaluation.     Risk factors chronic- history of legal issues, history of aggression/violence, history of substance use, history of inpatient admissions, history of 1 past suicide attempt in 1991. Patient risk factors mitigated by therapeutic interaction in ER, current sobriety, upcoming appointment with therapist at 2:30PM 3/14/19, safety plan with motivational interviewing, medication compliance with ample outpatient treatment/community supports.    High risk and will need inpatient admission

## 2019-04-23 NOTE — H&P ADULT - PROBLEM SELECTOR PLAN 3
1:1 observation   Tox Screen  CT head showed "Essentially nondiagnostic exam given marked motion degradation. No large acute intracranial hemorrhage or significant mass effect. Consider repeat imaging when patient is able." Trend CE's; Serial EKGs.

## 2019-04-23 NOTE — H&P ADULT - ASSESSMENT
52 y/o M with pmhx of HTN, DMT2, asthma, COPD, schizoaffective d/o bipolar type, ALEJANDRO on CPAP presents to ED s/p suicide attempt by overdose yesterday afternoon. Admit to telemetry for bradycardia, r/o ACS.     EKG: Sinus bradycardia @ 54bpm 54 y/o M with pmhx of HTN, DMT2, asthma, COPD, schizoaffective d/o bipolar type, ALEJANDRO on CPAP presents to ED s/p suicide attempt by overdose yesterday afternoon. Admit to telemetry for bradycardia, further monitoring     EKG: Sinus bradycardia @ 54bpm LAD<

## 2019-04-23 NOTE — H&P ADULT - NSICDXPASTMEDICALHX_GEN_ALL_CORE_FT
PAST MEDICAL HISTORY:  COPD with asthma     Diabetes Type 2; pt lost weight, no longer taking meds    Hypertension, unspecified type     Obstructive sleep apnea CPAP for 7-8 years    Paranoia     Schizoaffective disorder, bipolar type     Uncomplicated asthma, unspecified asthma severity

## 2019-04-23 NOTE — H&P ADULT - PROBLEM SELECTOR PLAN 2
Admit to telemetry   Start on aspirin 81mg   Trend CE's; Serial EKGs 1:1 observation, Psych c/s called

## 2019-04-24 DIAGNOSIS — F10.230 ALCOHOL DEPENDENCE WITH WITHDRAWAL, UNCOMPLICATED: ICD-10-CM

## 2019-04-24 PROBLEM — E11.9 TYPE 2 DIABETES MELLITUS WITHOUT COMPLICATIONS: Chronic | Status: ACTIVE | Noted: 2018-06-24

## 2019-04-24 LAB
ALBUMIN SERPL ELPH-MCNC: 3.9 G/DL — SIGNIFICANT CHANGE UP (ref 3.3–5)
ALBUMIN SERPL ELPH-MCNC: 3.9 G/DL — SIGNIFICANT CHANGE UP (ref 3.3–5)
ALP SERPL-CCNC: 86 U/L — SIGNIFICANT CHANGE UP (ref 40–120)
ALP SERPL-CCNC: 86 U/L — SIGNIFICANT CHANGE UP (ref 40–120)
ALT FLD-CCNC: 17 U/L — SIGNIFICANT CHANGE UP (ref 4–41)
ALT FLD-CCNC: 17 U/L — SIGNIFICANT CHANGE UP (ref 4–41)
ANION GAP SERPL CALC-SCNC: 13 MMO/L — SIGNIFICANT CHANGE UP (ref 7–14)
AST SERPL-CCNC: 22 U/L — SIGNIFICANT CHANGE UP (ref 4–40)
AST SERPL-CCNC: 22 U/L — SIGNIFICANT CHANGE UP (ref 4–40)
BASOPHILS # BLD AUTO: 0.04 K/UL — SIGNIFICANT CHANGE UP (ref 0–0.2)
BASOPHILS NFR BLD AUTO: 0.6 % — SIGNIFICANT CHANGE UP (ref 0–2)
BILIRUB DIRECT SERPL-MCNC: < 0.2 MG/DL — SIGNIFICANT CHANGE UP (ref 0.1–0.2)
BILIRUB SERPL-MCNC: 0.4 MG/DL — SIGNIFICANT CHANGE UP (ref 0.2–1.2)
BILIRUB SERPL-MCNC: 0.4 MG/DL — SIGNIFICANT CHANGE UP (ref 0.2–1.2)
BUN SERPL-MCNC: 15 MG/DL — SIGNIFICANT CHANGE UP (ref 7–23)
CALCIUM SERPL-MCNC: 10.1 MG/DL — SIGNIFICANT CHANGE UP (ref 8.4–10.5)
CHLORIDE SERPL-SCNC: 103 MMOL/L — SIGNIFICANT CHANGE UP (ref 98–107)
CHOLEST SERPL-MCNC: 152 MG/DL — SIGNIFICANT CHANGE UP (ref 120–199)
CK MB BLD-MCNC: 3.2 — HIGH (ref 0–2.5)
CK MB BLD-MCNC: 6.06 NG/ML — SIGNIFICANT CHANGE UP (ref 1–6.6)
CK SERPL-CCNC: 189 U/L — SIGNIFICANT CHANGE UP (ref 30–200)
CO2 SERPL-SCNC: 20 MMOL/L — LOW (ref 22–31)
CREAT SERPL-MCNC: 0.97 MG/DL — SIGNIFICANT CHANGE UP (ref 0.5–1.3)
EOSINOPHIL # BLD AUTO: 0.1 K/UL — SIGNIFICANT CHANGE UP (ref 0–0.5)
EOSINOPHIL NFR BLD AUTO: 1.4 % — SIGNIFICANT CHANGE UP (ref 0–6)
GLUCOSE SERPL-MCNC: 96 MG/DL — SIGNIFICANT CHANGE UP (ref 70–99)
HBA1C BLD-MCNC: 5.4 % — SIGNIFICANT CHANGE UP (ref 4–5.6)
HCT VFR BLD CALC: 46.6 % — SIGNIFICANT CHANGE UP (ref 39–50)
HCV AB S/CO SERPL IA: 0.08 S/CO — SIGNIFICANT CHANGE UP (ref 0–0.99)
HCV AB SERPL-IMP: SIGNIFICANT CHANGE UP
HDLC SERPL-MCNC: 52 MG/DL — SIGNIFICANT CHANGE UP (ref 35–55)
HGB BLD-MCNC: 15.7 G/DL — SIGNIFICANT CHANGE UP (ref 13–17)
IMM GRANULOCYTES NFR BLD AUTO: 0.4 % — SIGNIFICANT CHANGE UP (ref 0–1.5)
LIPID PNL WITH DIRECT LDL SERPL: 94 MG/DL — SIGNIFICANT CHANGE UP
LYMPHOCYTES # BLD AUTO: 1.67 K/UL — SIGNIFICANT CHANGE UP (ref 1–3.3)
LYMPHOCYTES # BLD AUTO: 23.8 % — SIGNIFICANT CHANGE UP (ref 13–44)
MAGNESIUM SERPL-MCNC: 1.9 MG/DL — SIGNIFICANT CHANGE UP (ref 1.6–2.6)
MCHC RBC-ENTMCNC: 31.6 PG — SIGNIFICANT CHANGE UP (ref 27–34)
MCHC RBC-ENTMCNC: 33.7 % — SIGNIFICANT CHANGE UP (ref 32–36)
MCV RBC AUTO: 93.8 FL — SIGNIFICANT CHANGE UP (ref 80–100)
MONOCYTES # BLD AUTO: 0.61 K/UL — SIGNIFICANT CHANGE UP (ref 0–0.9)
MONOCYTES NFR BLD AUTO: 8.7 % — SIGNIFICANT CHANGE UP (ref 2–14)
NEUTROPHILS # BLD AUTO: 4.56 K/UL — SIGNIFICANT CHANGE UP (ref 1.8–7.4)
NEUTROPHILS NFR BLD AUTO: 65.1 % — SIGNIFICANT CHANGE UP (ref 43–77)
NRBC # FLD: 0 K/UL — SIGNIFICANT CHANGE UP (ref 0–0)
PHOSPHATE SERPL-MCNC: 2.9 MG/DL — SIGNIFICANT CHANGE UP (ref 2.5–4.5)
PLATELET # BLD AUTO: 188 K/UL — SIGNIFICANT CHANGE UP (ref 150–400)
PMV BLD: 11.4 FL — SIGNIFICANT CHANGE UP (ref 7–13)
POTASSIUM SERPL-MCNC: 4.1 MMOL/L — SIGNIFICANT CHANGE UP (ref 3.5–5.3)
POTASSIUM SERPL-SCNC: 4.1 MMOL/L — SIGNIFICANT CHANGE UP (ref 3.5–5.3)
PROT SERPL-MCNC: 6.8 G/DL — SIGNIFICANT CHANGE UP (ref 6–8.3)
PROT SERPL-MCNC: 6.8 G/DL — SIGNIFICANT CHANGE UP (ref 6–8.3)
RBC # BLD: 4.97 M/UL — SIGNIFICANT CHANGE UP (ref 4.2–5.8)
RBC # FLD: 13.6 % — SIGNIFICANT CHANGE UP (ref 10.3–14.5)
SODIUM SERPL-SCNC: 136 MMOL/L — SIGNIFICANT CHANGE UP (ref 135–145)
TRIGL SERPL-MCNC: 70 MG/DL — SIGNIFICANT CHANGE UP (ref 10–149)
TROPONIN T, HIGH SENSITIVITY: 18 NG/L — SIGNIFICANT CHANGE UP (ref ?–14)
WBC # BLD: 7.01 K/UL — SIGNIFICANT CHANGE UP (ref 3.8–10.5)
WBC # FLD AUTO: 7.01 K/UL — SIGNIFICANT CHANGE UP (ref 3.8–10.5)

## 2019-04-24 PROCEDURE — 99233 SBSQ HOSP IP/OBS HIGH 50: CPT

## 2019-04-24 PROCEDURE — 99222 1ST HOSP IP/OBS MODERATE 55: CPT | Mod: GC

## 2019-04-24 PROCEDURE — 99232 SBSQ HOSP IP/OBS MODERATE 35: CPT

## 2019-04-24 RX ORDER — THIAMINE MONONITRATE (VIT B1) 100 MG
500 TABLET ORAL ONCE
Qty: 0 | Refills: 0 | Status: COMPLETED | OUTPATIENT
Start: 2019-04-24 | End: 2019-04-24

## 2019-04-24 RX ORDER — THIAMINE MONONITRATE (VIT B1) 100 MG
100 TABLET ORAL DAILY
Qty: 0 | Refills: 0 | Status: DISCONTINUED | OUTPATIENT
Start: 2019-04-24 | End: 2019-05-03

## 2019-04-24 RX ORDER — HYDRALAZINE HCL 50 MG
10 TABLET ORAL ONCE
Qty: 0 | Refills: 0 | Status: COMPLETED | OUTPATIENT
Start: 2019-04-24 | End: 2019-04-24

## 2019-04-24 RX ORDER — PHENOBARBITAL 60 MG
130 TABLET ORAL ONCE
Qty: 0 | Refills: 0 | Status: DISCONTINUED | OUTPATIENT
Start: 2019-04-24 | End: 2019-04-24

## 2019-04-24 RX ORDER — CHLORPROMAZINE HCL 10 MG
25 TABLET ORAL EVERY 6 HOURS
Qty: 0 | Refills: 0 | Status: DISCONTINUED | OUTPATIENT
Start: 2019-04-24 | End: 2019-04-24

## 2019-04-24 RX ORDER — MULTIVIT-MIN/FERROUS GLUCONATE 9 MG/15 ML
1 LIQUID (ML) ORAL DAILY
Qty: 0 | Refills: 0 | Status: DISCONTINUED | OUTPATIENT
Start: 2019-04-24 | End: 2019-04-27

## 2019-04-24 RX ORDER — FOLIC ACID 0.8 MG
1 TABLET ORAL DAILY
Qty: 0 | Refills: 0 | Status: DISCONTINUED | OUTPATIENT
Start: 2019-04-24 | End: 2019-05-03

## 2019-04-24 RX ADMIN — Medication 2 MILLIGRAM(S): at 04:15

## 2019-04-24 RX ADMIN — Medication 2 MILLIGRAM(S): at 09:45

## 2019-04-24 RX ADMIN — SODIUM CHLORIDE 80 MILLILITER(S): 9 INJECTION, SOLUTION INTRAVENOUS at 09:45

## 2019-04-24 RX ADMIN — Medication 2 MILLIGRAM(S): at 03:32

## 2019-04-24 RX ADMIN — Medication 2 MILLIGRAM(S): at 01:31

## 2019-04-24 RX ADMIN — Medication 2 MILLIGRAM(S): at 00:42

## 2019-04-24 RX ADMIN — Medication 3 MILLIGRAM(S): at 11:43

## 2019-04-24 RX ADMIN — Medication 3 MILLIGRAM(S): at 18:28

## 2019-04-24 RX ADMIN — HEPARIN SODIUM 5000 UNIT(S): 5000 INJECTION INTRAVENOUS; SUBCUTANEOUS at 13:23

## 2019-04-24 RX ADMIN — Medication 3 MILLIGRAM(S): at 23:53

## 2019-04-24 RX ADMIN — Medication 105 MILLIGRAM(S): at 18:33

## 2019-04-24 RX ADMIN — HEPARIN SODIUM 5000 UNIT(S): 5000 INJECTION INTRAVENOUS; SUBCUTANEOUS at 06:50

## 2019-04-24 RX ADMIN — Medication 3 MILLIGRAM(S): at 14:10

## 2019-04-24 RX ADMIN — Medication 10 MILLIGRAM(S): at 18:29

## 2019-04-24 RX ADMIN — Medication 2 MILLIGRAM(S): at 05:26

## 2019-04-24 RX ADMIN — Medication 130 MILLIGRAM(S): at 05:48
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
## 2019-04-24 NOTE — PROGRESS NOTE ADULT - PROBLEM SELECTOR PLAN 2
1:1 observation, Psych c/s called 1:1 observation  - Psych following, recommendations appreciated, will require inpatient psych

## 2019-04-24 NOTE — CHART NOTE - NSCHARTNOTEFT_GEN_A_CORE
Reviewed with Psychiatry attending and due to continued elevated CIWA scores and high requirement for ativan will increase ativan taper dose to Ativan IV 3mg q4x6 doses to start. Also advised to dc thorazine, previously recommended by pyschiatry resident, as lowers seizure threshold. Updated Dr. Silva of change in dose.
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
## 2019-04-24 NOTE — PROGRESS NOTE ADULT - SUBJECTIVE AND OBJECTIVE BOX
Patient is a 53y old  Male who presents with a chief complaint of c/o overdose (24 Apr 2019 01:58)        SUBJECTIVE / OVERNIGHT EVENTS:      MEDICATIONS  (STANDING):  heparin  Injectable 5000 Unit(s) SubCutaneous every 8 hours  lisinopril 5 milliGRAM(s) Oral daily  LORazepam   Injectable 2  IV Push   LORazepam   Injectable 3 milliGRAM(s) IV Push every 4 hours  sodium chloride 0.45%. 1000 milliLiter(s) (80 mL/Hr) IV Continuous <Continuous>    MEDICATIONS  (PRN):  LORazepam   Injectable 2 milliGRAM(s) IV Push every 1 hour PRN Symptom-triggered: each CIWA -Ar score 8 or GREATER      Vital Signs Last 24 Hrs  T(C): 36.5 (24 Apr 2019 11:00), Max: 36.7 (24 Apr 2019 06:00)  T(F): 97.7 (24 Apr 2019 11:00), Max: 98.1 (24 Apr 2019 06:00)  HR: 65 (24 Apr 2019 11:00) (53 - 77)  BP: 174/85 (24 Apr 2019 11:00) (130/89 - 174/85)  BP(mean): --  RR: 18 (24 Apr 2019 11:00) (18 - 21)  SpO2: 99% (24 Apr 2019 11:00) (96% - 100%)  CAPILLARY BLOOD GLUCOSE        I&O's Summary        PHYSICAL EXAM  GENERAL: NAD, well-developed  HEAD:  Atraumatic, Normocephalic  EYES: EOMI, PERRLA, conjunctiva and sclera clear  NECK: Supple, No JVD  CHEST/LUNG: Clear to auscultation bilaterally; No wheeze  HEART: Regular rate and rhythm; No murmurs, rubs, or gallops  ABDOMEN: Soft, Nontender, Nondistended; Bowel sounds present  EXTREMITIES:  2+ Peripheral Pulses, No clubbing, cyanosis, or edema  PSYCH: AAOx3  SKIN: No rashes or lesions    LABS:                        15.7   7.01  )-----------( 188      ( 24 Apr 2019 08:05 )             46.6     04-24    136  |  103  |  15  ----------------------------<  96  4.1   |  20<L>  |  0.97    Ca    10.1      24 Apr 2019 08:05  Phos  2.9     04-24  Mg     1.9     04-24    TPro  6.8  /  Alb  3.9  /  TBili  0.4  /  DBili  < 0.2  /  AST  22  /  ALT  17  /  AlkPhos  86  04-24      CARDIAC MARKERS ( 23 Apr 2019 22:45 )  x     / x     / 189 u/L / 6.06 ng/mL / x                RADIOLOGY & ADDITIONAL TESTS:    Imaging Personally Reviewed:  Consultant(s) Notes Reviewed:    Care Discussed with Consultants/Other Providers: Patient is a 53y old  Male who presents with a chief complaint of c/o overdose (24 Apr 2019 01:58)    SUBJECTIVE / OVERNIGHT EVENTS:  CIWA score 18 at 11PM last night, started on standing Ativan taper 2mg IV Q4H, however additionlly required 14mg IV PRN Ativan and phenobarbital 130mg IM total, evaluated and rejected by MICU.  Patient seen this morning resting, arousable.   Tele: Sinus bradycardia    MEDICATIONS  (STANDING):  heparin  Injectable 5000 Unit(s) SubCutaneous every 8 hours  lisinopril 5 milliGRAM(s) Oral daily  LORazepam   Injectable 2  IV Push   LORazepam   Injectable 3 milliGRAM(s) IV Push every 4 hours  sodium chloride 0.45%. 1000 milliLiter(s) (80 mL/Hr) IV Continuous <Continuous>    MEDICATIONS  (PRN):  LORazepam   Injectable 2 milliGRAM(s) IV Push every 1 hour PRN Symptom-triggered: each CIWA -Ar score 8 or GREATER      Vital Signs Last 24 Hrs  T(C): 36.5 (24 Apr 2019 11:00), Max: 36.7 (24 Apr 2019 06:00)  T(F): 97.7 (24 Apr 2019 11:00), Max: 98.1 (24 Apr 2019 06:00)  HR: 65 (24 Apr 2019 11:00) (53 - 77)  BP: 174/85 (24 Apr 2019 11:00) (130/89 - 174/85)  BP(mean): --  RR: 18 (24 Apr 2019 11:00) (18 - 21)  SpO2: 99% (24 Apr 2019 11:00) (96% - 100%)  CAPILLARY BLOOD GLUCOSE        I&O's Summary        PHYSICAL EXAM  GENERAL: Middle-aged man resting, arousable  HEAD:  Atraumatic, Normocephalic  EYES: EOMI, PERRLA, conjunctiva and sclera clear  NECK: Supple, No JVD  CHEST/LUNG: Clear to auscultation bilaterally; No wheeze  HEART: Regular rate and rhythm; No murmurs, rubs, or gallops  ABDOMEN: Soft, Nontender, Nondistended; Bowel sounds present  EXTREMITIES:  2+ Peripheral Pulses, No clubbing, cyanosis, or edema  PSYCH: AAOx3  SKIN: No rashes or lesions    LABS:                        15.7   7.01  )-----------( 188      ( 24 Apr 2019 08:05 )             46.6     04-24    136  |  103  |  15  ----------------------------<  96  4.1   |  20<L>  |  0.97    Ca    10.1      24 Apr 2019 08:05  Phos  2.9     04-24  Mg     1.9     04-24    TPro  6.8  /  Alb  3.9  /  TBili  0.4  /  DBili  < 0.2  /  AST  22  /  ALT  17  /  AlkPhos  86  04-24      CARDIAC MARKERS ( 23 Apr 2019 22:45 )  x     / x     / 189 u/L / 6.06 ng/mL / x                RADIOLOGY & ADDITIONAL TESTS:    Imaging Personally Reviewed:  Consultant(s) Notes Reviewed:    Care Discussed with Consultants/Other Providers:

## 2019-04-24 NOTE — CONSULT NOTE ADULT - ATTENDING COMMENTS
Asked to see patient for agitation. Pt  is a 54 yo male with hx  of dm, htn, asthma, suicide attempts reportedly overdosed   on oxycodone and gabapentin with etoh use.  Pt in bed sleeping  with occasional hand movements.  /74 p 84 rr 18   patient on room air, He has received symptom triggered  ativan.   -suggest change to ativan taper with prn  -monitor for signs of increased agitation  -pt is currently stable and does not require icu level care,  please reconsult if condition changes.
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
## 2019-04-24 NOTE — PROGRESS NOTE BEHAVIORAL HEALTH - NSBHFUPINTERVALHXFT_PSY_A_CORE
Pt noted to have CIWA score 18 at 11PM last night, started on standing Ativan taper 2mg IV Q4H, however additionlly required 14mg IV PRN Ativan and phenobarbital 130mg IM total.  CIWA score again 18 at 5AM today.  Pt currently nonverbal but scratching at his skin, seen attempting to eat gown.  Of note, BP severely elevated to 174/85. Pt noted to have CIWA score 18 at 11PM last night, started on standing Ativan taper 2mg IV Q4H, however additionlly required 14mg IV PRN Ativan and phenobarbital 130mg IM total.  CIWA score again 18 at 5AM today.  Pt currently nonverbal but scratching at his skin,.  Of note, BP severely elevated to 174/85.

## 2019-04-24 NOTE — PROGRESS NOTE ADULT - PROBLEM SELECTOR PLAN 6
VA checked to be low  - restart depakote   - Psych following, recommendations appreciated, will require inpatient psych admission after medically optimized   -  consult Psych following  - Hold all standing psychiatric medications as per psych  - Patient will require inpatient psych admission after medically optimized

## 2019-04-24 NOTE — PROGRESS NOTE ADULT - PROBLEM SELECTOR PLAN 1
As per toxicology, Ziprasidone can cause QTc prolongation but does not typically present with bradycardia.  Oxycodone, Gabapentin, and Meloxicam also do not typically present with bradycardia  - continue to monitor on tele   - EP consult for further management As per toxicology, Ziprasidone can cause QTc prolongation but does not typically present with bradycardia.  Oxycodone, Gabapentin, and Meloxicam also do not typically present with bradycardia  TSH WNL  - continue to monitor on tele   - EP consult for further management As per toxicology, Ziprasidone can cause QTc prolongation but does not typically present with bradycardia.  Oxycodone, Gabapentin, and Meloxicam also do not typically present with bradycardia  TSH WNL  - F/U TTE  - continue to monitor on tele   - EP consult for further management

## 2019-04-24 NOTE — PROGRESS NOTE ADULT - PROBLEM SELECTOR PLAN 4
Trend CE's; Serial EKGs. Low suspicion for ACS, EKG without ST/Twave changes, indeterminant troponin trended down  - F/U TTE   - monitor on tele

## 2019-04-24 NOTE — CHART NOTE - NSCHARTNOTEFT_GEN_A_CORE
Patient is a 54 yo M presents s/p Suicide Attempt with pill ingestion, pt noted to be kaylee to 40's overnight in ED,     Suicide Attempt / Pill ingestion - Constant Observatin 1:1  Psych following  + ETOH / Benzo on Serum Tox - Toxicology following  CIWA - symptom triggered protocol on Ativan    Pt's CIWA score was > 8  - pt given Ativan 2 mg IVP    reassessment of CIWA score was 18 - Additional Ativan 2 mg IVP given - MICU eval called s/w Dr Davila - pending recs    Called by Elena ZURITA - pt's CIWA scores were 15 - additional dose of Ativan 2 mg IVP given   reassessment of CIWA score was 13 - Ativan 2 mg IVP per CIWA protocol given - Dr Davila recalled - awaiting recs  Aspiration precautions, frequent suctioning   pt's condition is guarded will continue to monitor Patient is a 54 yo M presents s/p Suicide Attempt with pill ingestion, pt noted to be kaylee to 40's overnight in ED,     Suicide Attempt / Pill ingestion - Constant Observatin 1:1  Psych following  + ETOH / Benzo on Serum Tox - Toxicology following  CIWA - symptom triggered protocol on Ativan    Pt's CIWA score was > 8  - pt given Ativan 2 mg IVP    reassessment of CIWA score was 18 - Additional Ativan 2 mg IVP given - MICU eval called s/w Dr Davila - pending recs    Called by Elena ZURITA - pt's CIWA scores were 15 - additional dose of Ativan 2 mg IVP given   reassessment of CIWA score was 13 - Ativan 2 mg IVP per CIWA protocol given - Dr Davila recalled - awaiting recs  Aspiration precautions, frequent suctioning   pt's condition is guarded will continue to monitor    4/24/19- 7 am   Received a call from Elnea ZURITA - pt was agitated while nursing staff attempting IV access - MICU was called - pt was given Phenobarbital - now sedated. Will hold 6 am Ativan standing taper dose

## 2019-04-24 NOTE — CONSULT NOTE ADULT - SUBJECTIVE AND OBJECTIVE BOX
CHIEF COMPLAINT: suicidal thoughts    HISTORY OF PRESENT ILLNESS:  53M with HTN, COPD, ALEJANDRO on CPAP, chronic back pain, schizoaffective disorder (bipolar type) who presented to the ED after a suicide attempt with oxycodone, gabapentin, ziprasidone and meloxicam. Patient has been bradycardic since presentation and EP is consulted for further assessment.       Allergies  No Known Allergies    Intolerances  Haldol (Unknown)  	    MEDICATIONS:  heparin  Injectable 5000 Unit(s) SubCutaneous every 8 hours  lisinopril 5 milliGRAM(s) Oral daily  LORazepam   Injectable 2 milliGRAM(s) IV Push every 1 hour PRN  LORazepam   Injectable 2  IV Push   LORazepam   Injectable 3 milliGRAM(s) IV Push every 4 hours  folic acid 1 milliGRAM(s) Oral daily  multivitamin/minerals 1 Tablet(s) Oral daily  sodium chloride 0.45%. 1000 milliLiter(s) IV Continuous <Continuous>  thiamine 100 milliGRAM(s) Oral daily      PAST MEDICAL & SURGICAL HISTORY:  COPD with asthma  Obstructive sleep apnea: CPAP for 7-8 years  Schizoaffective disorder, bipolar type  Diabetes: Type 2; pt lost weight, no longer taking meds  Hypertension, unspecified type  Paranoia  Uncomplicated asthma, unspecified asthma severity  S/P appendectomy      FAMILY HISTORY:  FH: cirrhosis: maternal grandmother, uncle      SOCIAL HISTORY:    Non-smoker  Denies EtOH, illicit drugs    REVIEW OF SYSTEMS:  General: no fatigue/malaise, weight loss/gain.  Skin: no rashes.  Ophthalmologic: no blurred vision, no loss of vision. 	  ENT: no sore throat, rhinorrhea, sinus congestion.  Respiratory: no SOB, cough or wheeze.  Gastrointestinal:  no N/V/D, no melena/hematemesis/hematochezia.  Genitourinary: no dysuria/hesitancy or hematuria.  Musculoskeletal: no myalgias or arthralgias.  Neurological: no changes in vision or hearing, no lightheadedness/dizziness, no syncope/near syncope	  Psychiatric: no unusual stress/anxiety.   Hematology/Lymphatics: no unusual bleeding, bruising and no lymphadenopathy.  Endocrine: no unusual thirst.   All others negative except as stated above and in HPI.    PHYSICAL EXAM:  T(C): 36.6 (04-24-19 @ 13:00), Max: 36.7 (04-24-19 @ 06:00)  HR: 60 (04-24-19 @ 13:00) (53 - 77)  BP: 158/96 (04-24-19 @ 13:00) (130/89 - 174/85)  RR: 18 (04-24-19 @ 13:00) (18 - 21)  SpO2: 97% (04-24-19 @ 13:00) (96% - 100%)  Wt(kg): --  I&O's Summary      Appearance: no acute distress  HEENT:   Normal oral mucosa, PERRL, EOMI	  Lymphatic: No lymphadenopathy  Cardiovascular: RRR, Normal S1 S2, No JVD, No murmurs, No edema  Respiratory: Lungs clear to auscultation	  Psychiatry: A & O x 3, Mood & affect appropriate  Gastrointestinal:  Soft, Non-tender, + BS	  Skin: No rashes, No ecchymoses, No cyanosis	  Neurologic: Non-focal  Extremities: Normal range of motion, No clubbing, cyanosis or edema  Vascular: Peripheral pulses palpable 2+ bilaterally      LABS:	 	    CBC Full  -  ( 24 Apr 2019 08:05 )  WBC Count : 7.01 K/uL  Hemoglobin : 15.7 g/dL  Hematocrit : 46.6 %  Platelet Count - Automated : 188 K/uL  Mean Cell Volume : 93.8 fL  Mean Cell Hemoglobin : 31.6 pg  Mean Cell Hemoglobin Concentration : 33.7 %  Auto Neutrophil # : 4.56 K/uL  Auto Lymphocyte # : 1.67 K/uL  Auto Monocyte # : 0.61 K/uL  Auto Eosinophil # : 0.10 K/uL  Auto Basophil # : 0.04 K/uL  Auto Neutrophil % : 65.1 %  Auto Lymphocyte % : 23.8 %  Auto Monocyte % : 8.7 %  Auto Eosinophil % : 1.4 %  Auto Basophil % : 0.6 %    04-24    136  |  103  |  15  ----------------------------<  96  4.1   |  20<L>  |  0.97  04-23    137  |  104  |  18  ----------------------------<  96  3.6   |  21<L>  |  1.04    Ca    10.1      24 Apr 2019 08:05  Ca    9.7      23 Apr 2019 22:45  Phos  2.9     04-24  Phos  3.1     04-23  Mg     1.9     04-24  Mg     1.9     04-23    TPro  6.8  /  Alb  3.9  /  TBili  0.4  /  DBili  < 0.2  /  AST  22  /  ALT  17  /  AlkPhos  86  04-24  TPro  6.3  /  Alb  3.8  /  TBili  0.3  /  DBili  x   /  AST  13  /  ALT  16  /  AlkPhos  83  04-23      proBNP:   Lipid Profile:   HgA1c: Hemoglobin A1C, Whole Blood: 5.4 % (04-24 @ 08:05)    TSH: Thyroid Stimulating Hormone, Serum: 2.33 uIU/mL (04-23 @ 22:45)        CARDIAC MARKERS:      CKMB: 6.06 ng/mL (04-23 @ 22:45)    CKMB Relative Index: 3.2 (04-23 @ 22:45)      TELEMETRY: 	    EKG:  	  RADIOLOGY:      	  ASSESSMENT/PLAN:    #Bradycardia 	      KAILA Lowry  Cardiology Fellow   [id number]  New EP consults: [id number] CHIEF COMPLAINT: suicidal thoughts, overdose    HISTORY OF PRESENT ILLNESS:  53M with HTN, COPD, ALEJANDRO on CPAP, chronic back pain, schizoaffective disorder (bipolar type) who presented to the ED after a suicide attempt with oxycodone, gabapentin, ziprasidone and meloxicam. Patient has been bradycardic since presentation and EP is consulted for further assessment. He is on CIWA protocol and required total of Ativan 14 mg IV PRN and phenobarbital 130 mg IM x1 in addition to Ativan 2 mg q4H.     Patient poor historian. He wakens to voice, but is unable to give a history.       Allergies  No Known Allergies    Intolerances  Haldol (Unknown)  	    MEDICATIONS:  heparin  Injectable 5000 Unit(s) SubCutaneous every 8 hours  lisinopril 5 milliGRAM(s) Oral daily  LORazepam   Injectable 2 milliGRAM(s) IV Push every 1 hour PRN  LORazepam   Injectable 2  IV Push   LORazepam   Injectable 3 milliGRAM(s) IV Push every 4 hours  folic acid 1 milliGRAM(s) Oral daily  multivitamin/minerals 1 Tablet(s) Oral daily  sodium chloride 0.45%. 1000 milliLiter(s) IV Continuous <Continuous>  thiamine 100 milliGRAM(s) Oral daily      PAST MEDICAL & SURGICAL HISTORY:  COPD with asthma  Obstructive sleep apnea: CPAP for 7-8 years  Schizoaffective disorder, bipolar type  Diabetes: Type 2; pt lost weight, no longer taking meds  Hypertension, unspecified type  Paranoia  Uncomplicated asthma, unspecified asthma severity  S/P appendectomy      FAMILY HISTORY:  FH: cirrhosis: maternal grandmother, uncle      SOCIAL HISTORY:    Unable to obtain    REVIEW OF SYSTEMS:  Unable to obtain    PHYSICAL EXAM:  T(C): 36.6 (04-24-19 @ 13:00), Max: 36.7 (04-24-19 @ 06:00)  HR: 60 (04-24-19 @ 13:00) (53 - 77)  BP: 158/96 (04-24-19 @ 13:00) (130/89 - 174/85)  RR: 18 (04-24-19 @ 13:00) (18 - 21)  SpO2: 97% (04-24-19 @ 13:00) (96% - 100%)  Wt(kg): --  I&O's Summary      Appearance: rolling in bed back and forth, smells of urine  HEENT:   Normal oral mucosa, PERRL, EOMI	  Lymphatic: unable to obtain  Cardiovascular: unable to obtain  Respiratory: unable to obtain  Psychiatry: A & O x1  Gastrointestinal:  unable to obtain  Skin: Tattoos, no rashes  Neurologic: Non-focal  Extremities: Normal range of motion, No clubbing, cyanosis or edema  Vascular: unable to obtain      LABS:	 	    CBC Full  -  ( 24 Apr 2019 08:05 )  WBC Count : 7.01 K/uL  Hemoglobin : 15.7 g/dL  Hematocrit : 46.6 %  Platelet Count - Automated : 188 K/uL  Mean Cell Volume : 93.8 fL  Mean Cell Hemoglobin : 31.6 pg  Mean Cell Hemoglobin Concentration : 33.7 %  Auto Neutrophil # : 4.56 K/uL  Auto Lymphocyte # : 1.67 K/uL  Auto Monocyte # : 0.61 K/uL  Auto Eosinophil # : 0.10 K/uL  Auto Basophil # : 0.04 K/uL  Auto Neutrophil % : 65.1 %  Auto Lymphocyte % : 23.8 %  Auto Monocyte % : 8.7 %  Auto Eosinophil % : 1.4 %  Auto Basophil % : 0.6 %    04-24    136  |  103  |  15  ----------------------------<  96  4.1   |  20<L>  |  0.97  04-23    137  |  104  |  18  ----------------------------<  96  3.6   |  21<L>  |  1.04    Ca    10.1      24 Apr 2019 08:05  Ca    9.7      23 Apr 2019 22:45  Phos  2.9     04-24  Phos  3.1     04-23  Mg     1.9     04-24  Mg     1.9     04-23    TPro  6.8  /  Alb  3.9  /  TBili  0.4  /  DBili  < 0.2  /  AST  22  /  ALT  17  /  AlkPhos  86  04-24  TPro  6.3  /  Alb  3.8  /  TBili  0.3  /  DBili  x   /  AST  13  /  ALT  16  /  AlkPhos  83  04-23      proBNP:   Lipid Profile:   HgA1c: Hemoglobin A1C, Whole Blood: 5.4 % (04-24 @ 08:05)    TSH: Thyroid Stimulating Hormone, Serum: 2.33 uIU/mL (04-23 @ 22:45)        CARDIAC MARKERS:  HS trop 19/18/18    CKMB: 6.06 ng/mL (04-23 @ 22:45)    CKMB Relative Index: 3.2 (04-23 @ 22:45)      TELEMETRY: sinus kaylee 50-60s    EKG 4/23 at 16:06: Sinus kaylee, left axis deviation   RADIOLOGY:      	  ASSESSMENT/PLAN:  53M with HTN, COPD, ALEJANDRO on CPAP, chronic back pain, schizoaffective disorder (bipolar type) who presented to the ED after a suicide attempt with oxycodone, gabapentin, ziprasidone and meloxicam. Patient has been bradycardic since presentation and EP is consulted for further assessment.    #Sinus bradycardia 	  - Continue to monitor on tele  - Maintain K>4, Mg>2  - Will f/u on TTE    KAILA Lowry  Cardiology Fellow   [phone]  New EP consults: [id number] CHIEF COMPLAINT: suicidal thoughts, overdose    HISTORY OF PRESENT ILLNESS:  53M with HTN, COPD, ALEJANDRO on CPAP, chronic back pain, schizoaffective disorder (bipolar type) who presented to the ED after a suicide attempt with oxycodone, gabapentin, ziprasidone and meloxicam. Patient has been bradycardic since presentation and EP is consulted for further assessment. He is on CIWA protocol and required total of Ativan 14 mg IV PRN and phenobarbital 130 mg IM x1 in addition to Ativan 2 mg q4H.     Patient poor historian. He wakens to voice, but is unable to give a history.       Allergies  No Known Allergies    Intolerances  Haldol (Unknown)  	    MEDICATIONS:  heparin  Injectable 5000 Unit(s) SubCutaneous every 8 hours  lisinopril 5 milliGRAM(s) Oral daily  LORazepam   Injectable 2 milliGRAM(s) IV Push every 1 hour PRN  LORazepam   Injectable 2  IV Push   LORazepam   Injectable 3 milliGRAM(s) IV Push every 4 hours  folic acid 1 milliGRAM(s) Oral daily  multivitamin/minerals 1 Tablet(s) Oral daily  sodium chloride 0.45%. 1000 milliLiter(s) IV Continuous <Continuous>  thiamine 100 milliGRAM(s) Oral daily      PAST MEDICAL & SURGICAL HISTORY:  COPD with asthma  Obstructive sleep apnea: CPAP for 7-8 years  Schizoaffective disorder, bipolar type  Diabetes: Type 2; pt lost weight, no longer taking meds  Hypertension, unspecified type  Paranoia  Uncomplicated asthma, unspecified asthma severity  S/P appendectomy      FAMILY HISTORY:  FH: cirrhosis: maternal grandmother, uncle      SOCIAL HISTORY:    Unable to obtain    REVIEW OF SYSTEMS:  Unable to obtain    PHYSICAL EXAM:  T(C): 36.6 (04-24-19 @ 13:00), Max: 36.7 (04-24-19 @ 06:00)  HR: 60 (04-24-19 @ 13:00) (53 - 77)  BP: 158/96 (04-24-19 @ 13:00) (130/89 - 174/85)  RR: 18 (04-24-19 @ 13:00) (18 - 21)  SpO2: 97% (04-24-19 @ 13:00) (96% - 100%)  Wt(kg): --  I&O's Summary      Appearance: rolling in bed back and forth, smells of urine  HEENT:   Normal oral mucosa, PERRL, EOMI	  Lymphatic: unable to obtain  Cardiovascular: unable to obtain  Respiratory: unable to obtain  Psychiatry: A & O x1  Gastrointestinal:  unable to obtain  Skin: Tattoos, no rashes  Neurologic: Non-focal  Extremities: Normal range of motion, No clubbing, cyanosis or edema  Vascular: unable to obtain      LABS:	 	    CBC Full  -  ( 24 Apr 2019 08:05 )  WBC Count : 7.01 K/uL  Hemoglobin : 15.7 g/dL  Hematocrit : 46.6 %  Platelet Count - Automated : 188 K/uL  Mean Cell Volume : 93.8 fL  Mean Cell Hemoglobin : 31.6 pg  Mean Cell Hemoglobin Concentration : 33.7 %  Auto Neutrophil # : 4.56 K/uL  Auto Lymphocyte # : 1.67 K/uL  Auto Monocyte # : 0.61 K/uL  Auto Eosinophil # : 0.10 K/uL  Auto Basophil # : 0.04 K/uL  Auto Neutrophil % : 65.1 %  Auto Lymphocyte % : 23.8 %  Auto Monocyte % : 8.7 %  Auto Eosinophil % : 1.4 %  Auto Basophil % : 0.6 %    04-24    136  |  103  |  15  ----------------------------<  96  4.1   |  20<L>  |  0.97  04-23    137  |  104  |  18  ----------------------------<  96  3.6   |  21<L>  |  1.04    Ca    10.1      24 Apr 2019 08:05  Ca    9.7      23 Apr 2019 22:45  Phos  2.9     04-24  Phos  3.1     04-23  Mg     1.9     04-24  Mg     1.9     04-23    TPro  6.8  /  Alb  3.9  /  TBili  0.4  /  DBili  < 0.2  /  AST  22  /  ALT  17  /  AlkPhos  86  04-24  TPro  6.3  /  Alb  3.8  /  TBili  0.3  /  DBili  x   /  AST  13  /  ALT  16  /  AlkPhos  83  04-23      proBNP:   Lipid Profile:   HgA1c: Hemoglobin A1C, Whole Blood: 5.4 % (04-24 @ 08:05)    TSH: Thyroid Stimulating Hormone, Serum: 2.33 uIU/mL (04-23 @ 22:45)        CARDIAC MARKERS:  HS trop 19/18/18    CKMB: 6.06 ng/mL (04-23 @ 22:45)    CKMB Relative Index: 3.2 (04-23 @ 22:45)      TELEMETRY: sinus kaylee 50-60s    EKG 4/23 at 16:06: Sinus kaylee, left axis deviation       	  ASSESSMENT/PLAN:  53M with HTN, COPD, ALEJANDRO on CPAP, chronic back pain, schizoaffective disorder (bipolar type) who presented to the ED after a suicide attempt with oxycodone, gabapentin, ziprasidone and meloxicam. Patient has been bradycardic since presentation and EP is consulted for further assessment.    #Sinus bradycardia   - HR 50-60s. May be related to opioid ingestion. 	  - Continue to monitor on tele  - Maintain K>4, Mg>2  - PPM not indicated  - Will f/u on TTE    KAILA Lowry  Cardiology Fellow   [id number]  New EP consults: [id number]

## 2019-04-25 LAB
ANION GAP SERPL CALC-SCNC: 12 MMO/L — SIGNIFICANT CHANGE UP (ref 7–14)
BUN SERPL-MCNC: 15 MG/DL — SIGNIFICANT CHANGE UP (ref 7–23)
CALCIUM SERPL-MCNC: 10.1 MG/DL — SIGNIFICANT CHANGE UP (ref 8.4–10.5)
CHLORIDE SERPL-SCNC: 102 MMOL/L — SIGNIFICANT CHANGE UP (ref 98–107)
CO2 SERPL-SCNC: 24 MMOL/L — SIGNIFICANT CHANGE UP (ref 22–31)
CREAT SERPL-MCNC: 0.94 MG/DL — SIGNIFICANT CHANGE UP (ref 0.5–1.3)
GLUCOSE SERPL-MCNC: 81 MG/DL — SIGNIFICANT CHANGE UP (ref 70–99)
HCT VFR BLD CALC: 48.6 % — SIGNIFICANT CHANGE UP (ref 39–50)
HGB BLD-MCNC: 16.3 G/DL — SIGNIFICANT CHANGE UP (ref 13–17)
MAGNESIUM SERPL-MCNC: 1.9 MG/DL — SIGNIFICANT CHANGE UP (ref 1.6–2.6)
MCHC RBC-ENTMCNC: 31.2 PG — SIGNIFICANT CHANGE UP (ref 27–34)
MCHC RBC-ENTMCNC: 33.5 % — SIGNIFICANT CHANGE UP (ref 32–36)
MCV RBC AUTO: 93.1 FL — SIGNIFICANT CHANGE UP (ref 80–100)
NRBC # FLD: 0 K/UL — SIGNIFICANT CHANGE UP (ref 0–0)
PLATELET # BLD AUTO: 195 K/UL — SIGNIFICANT CHANGE UP (ref 150–400)
PMV BLD: 11.2 FL — SIGNIFICANT CHANGE UP (ref 7–13)
POTASSIUM SERPL-MCNC: 3.6 MMOL/L — SIGNIFICANT CHANGE UP (ref 3.5–5.3)
POTASSIUM SERPL-SCNC: 3.6 MMOL/L — SIGNIFICANT CHANGE UP (ref 3.5–5.3)
RBC # BLD: 5.22 M/UL — SIGNIFICANT CHANGE UP (ref 4.2–5.8)
RBC # FLD: 13.6 % — SIGNIFICANT CHANGE UP (ref 10.3–14.5)
SODIUM SERPL-SCNC: 138 MMOL/L — SIGNIFICANT CHANGE UP (ref 135–145)
WBC # BLD: 6.71 K/UL — SIGNIFICANT CHANGE UP (ref 3.8–10.5)
WBC # FLD AUTO: 6.71 K/UL — SIGNIFICANT CHANGE UP (ref 3.8–10.5)

## 2019-04-25 PROCEDURE — 99233 SBSQ HOSP IP/OBS HIGH 50: CPT

## 2019-04-25 RX ORDER — HYDRALAZINE HCL 50 MG
10 TABLET ORAL ONCE
Qty: 0 | Refills: 0 | Status: COMPLETED | OUTPATIENT
Start: 2019-04-25 | End: 2019-04-25

## 2019-04-25 RX ADMIN — HEPARIN SODIUM 5000 UNIT(S): 5000 INJECTION INTRAVENOUS; SUBCUTANEOUS at 07:54

## 2019-04-25 RX ADMIN — Medication 1 MILLIGRAM(S): at 13:10

## 2019-04-25 RX ADMIN — Medication 2 MILLIGRAM(S): at 10:23

## 2019-04-25 RX ADMIN — Medication 100 MILLIGRAM(S): at 13:10

## 2019-04-25 RX ADMIN — Medication 10 MILLIGRAM(S): at 07:55

## 2019-04-25 RX ADMIN — HEPARIN SODIUM 5000 UNIT(S): 5000 INJECTION INTRAVENOUS; SUBCUTANEOUS at 23:53

## 2019-04-25 RX ADMIN — Medication 3 MILLIGRAM(S): at 01:57

## 2019-04-25 RX ADMIN — Medication 2 MILLIGRAM(S): at 18:11

## 2019-04-25 RX ADMIN — HEPARIN SODIUM 5000 UNIT(S): 5000 INJECTION INTRAVENOUS; SUBCUTANEOUS at 13:09

## 2019-04-25 RX ADMIN — Medication 3 MILLIGRAM(S): at 07:56

## 2019-04-25 RX ADMIN — Medication 2 MILLIGRAM(S): at 13:09

## 2019-04-25 RX ADMIN — Medication 1 TABLET(S): at 13:09

## 2019-04-25 RX ADMIN — HEPARIN SODIUM 5000 UNIT(S): 5000 INJECTION INTRAVENOUS; SUBCUTANEOUS at 22:13

## 2019-04-25 RX ADMIN — Medication 2 MILLIGRAM(S): at 22:13

## 2019-04-25 NOTE — PROGRESS NOTE ADULT - PROBLEM SELECTOR PLAN 1
As per toxicology, Ziprasidone can cause QTc prolongation but does not typically present with bradycardia.  Oxycodone, Gabapentin, and Meloxicam also do not typically present with bradycardia  TSH WNL  - F/U TTE  - Evaluated by EP, no PM indicated at this time  - continue to monitor on tele

## 2019-04-25 NOTE — PROGRESS NOTE ADULT - SUBJECTIVE AND OBJECTIVE BOX
TELEMETRY NOTE    Telemetry reviewed. Patient only intermittently on telemetry due to agitation. Sinus bradycardia yesterday (as low 40s). Today, NSR 70-90s. PPM not indicated. Maintain K>4, Mg>2.     KAILA Lowry  [id number]  New EP consults: [id number]

## 2019-04-25 NOTE — PROGRESS NOTE ADULT - PROBLEM SELECTOR PLAN 4
Low suspicion for ACS, EKG without ST/Twave changes, indeterminant troponin trended down  - F/U TTE   - monitor on tele

## 2019-04-25 NOTE — PROGRESS NOTE BEHAVIORAL HEALTH - NSBHFUPINTERVALHXFT_PSY_A_CORE
Pt seen for f/u of alcohol withdrawal.  CIWAs 7-10 for agitation, tactile disturbance, and disorientation.  Pt has not received any PRNs since last eval.  Pt currently nonverbal, keeping eyes closed, will open eyes briefly on command, scratching at skin.  Per nursing, pt was alert, talkative, and eating prior to interview.

## 2019-04-25 NOTE — PROGRESS NOTE ADULT - PROBLEM SELECTOR PLAN 6
Psych following  - Hold all standing psychiatric medications as per psych  - Patient will require inpatient psych admission after medically optimized
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
## 2019-04-25 NOTE — PROGRESS NOTE ADULT - SUBJECTIVE AND OBJECTIVE BOX
Patient is a 53y old  Male who presents with a chief complaint of c/o overdose (25 Apr 2019 10:58)    SUBJECTIVE / OVERNIGHT EVENTS:  Patient seen lethargic, denying any complaints.     MEDICATIONS  (STANDING):  folic acid 1 milliGRAM(s) Oral daily  heparin  Injectable 5000 Unit(s) SubCutaneous every 8 hours  lisinopril 5 milliGRAM(s) Oral daily  LORazepam   Injectable 2  IV Push   LORazepam   Injectable 2 milliGRAM(s) IV Push every 4 hours  multivitamin/minerals 1 Tablet(s) Oral daily  sodium chloride 0.45%. 1000 milliLiter(s) (80 mL/Hr) IV Continuous <Continuous>  thiamine 100 milliGRAM(s) Oral daily    MEDICATIONS  (PRN):  LORazepam   Injectable 2 milliGRAM(s) IV Push every 1 hour PRN Symptom-triggered: each CIWA -Ar score 8 or GREATER      Vital Signs Last 24 Hrs  T(C): 36.7 (25 Apr 2019 11:43), Max: 37.1 (25 Apr 2019 02:00)  T(F): 98 (25 Apr 2019 11:43), Max: 98.7 (25 Apr 2019 02:00)  HR: 88 (25 Apr 2019 11:43) (49 - 94)  BP: 112/71 (25 Apr 2019 11:43) (111/62 - 192/95)  BP(mean): --  RR: 18 (25 Apr 2019 11:43) (16 - 20)  SpO2: 100% (25 Apr 2019 11:43) (95% - 100%)  CAPILLARY BLOOD GLUCOSE        I&O's Summary    24 Apr 2019 07:01  -  25 Apr 2019 07:00  --------------------------------------------------------  IN: 0 mL / OUT: 350 mL / NET: -350 mL      PHYSICAL EXAM  GENERAL: Middle-aged man resting, arousable  HEAD:  Atraumatic, Normocephalic  EYES: EOMI, PERRLA, conjunctiva and sclera clear  NECK: Supple, No JVD  CHEST/LUNG: Clear to auscultation bilaterally; No wheeze  HEART: Regular rate and rhythm; No murmurs, rubs, or gallops  ABDOMEN: Soft, Nontender, Nondistended; Bowel sounds present  EXTREMITIES:  2+ Peripheral Pulses, No clubbing, cyanosis, or edema  PSYCH: AAOx3  SKIN: No rashes or lesions        LABS:                        16.3   6.71  )-----------( 195      ( 25 Apr 2019 06:38 )             48.6     04-25    138  |  102  |  15  ----------------------------<  81  3.6   |  24  |  0.94    Ca    10.1      25 Apr 2019 06:38  Phos  2.9     04-24  Mg     1.9     04-25    TPro  6.8  /  Alb  3.9  /  TBili  0.4  /  DBili  < 0.2  /  AST  22  /  ALT  17  /  AlkPhos  86  04-24      CARDIAC MARKERS ( 23 Apr 2019 22:45 )  x     / x     / 189 u/L / 6.06 ng/mL / x                RADIOLOGY & ADDITIONAL TESTS:    Imaging Personally Reviewed:  Consultant(s) Notes Reviewed:    Care Discussed with Consultants/Other Providers:

## 2019-04-25 NOTE — CHART NOTE - NSCHARTNOTEFT_GEN_A_CORE
Notified by RN pt get agitated when she attempts vital signs and blood pressure is uncontrolled, when pt is left alone he is calm and sleeping    pt on CIWA protocol on Ativan taper, pt sedated unable to tolerate PO meds   Hydralazine 10 mg IVP x 1 ordered for uncontrolled HTN     Vital Signs Last 24 Hrs  T(C): 36.6 (25 Apr 2019 03:00), Max: 37.1 (25 Apr 2019 02:00)  T(F): 97.8 (25 Apr 2019 03:00), Max: 98.7 (25 Apr 2019 02:00)  HR: 69 (25 Apr 2019 03:03) (49 - 94)  BP: 157/100 (25 Apr 2019 03:00) (126/80 - 192/95)  BP(mean): --  RR: 20 (25 Apr 2019 03:00) (16 - 20)  SpO2: 96% (25 Apr 2019 03:03) (95% - 99%)

## 2019-04-26 LAB
ANION GAP SERPL CALC-SCNC: 13 MMO/L — SIGNIFICANT CHANGE UP (ref 7–14)
BUN SERPL-MCNC: 18 MG/DL — SIGNIFICANT CHANGE UP (ref 7–23)
CALCIUM SERPL-MCNC: 10.5 MG/DL — SIGNIFICANT CHANGE UP (ref 8.4–10.5)
CHLORIDE SERPL-SCNC: 104 MMOL/L — SIGNIFICANT CHANGE UP (ref 98–107)
CO2 SERPL-SCNC: 22 MMOL/L — SIGNIFICANT CHANGE UP (ref 22–31)
CREAT SERPL-MCNC: 0.95 MG/DL — SIGNIFICANT CHANGE UP (ref 0.5–1.3)
GLUCOSE SERPL-MCNC: 82 MG/DL — SIGNIFICANT CHANGE UP (ref 70–99)
HCT VFR BLD CALC: 50.1 % — HIGH (ref 39–50)
HGB BLD-MCNC: 16.7 G/DL — SIGNIFICANT CHANGE UP (ref 13–17)
MCHC RBC-ENTMCNC: 31.2 PG — SIGNIFICANT CHANGE UP (ref 27–34)
MCHC RBC-ENTMCNC: 33.3 % — SIGNIFICANT CHANGE UP (ref 32–36)
MCV RBC AUTO: 93.5 FL — SIGNIFICANT CHANGE UP (ref 80–100)
NRBC # FLD: 0 K/UL — SIGNIFICANT CHANGE UP (ref 0–0)
PLATELET # BLD AUTO: 225 K/UL — SIGNIFICANT CHANGE UP (ref 150–400)
PMV BLD: 11.5 FL — SIGNIFICANT CHANGE UP (ref 7–13)
POTASSIUM SERPL-MCNC: 4 MMOL/L — SIGNIFICANT CHANGE UP (ref 3.5–5.3)
POTASSIUM SERPL-SCNC: 4 MMOL/L — SIGNIFICANT CHANGE UP (ref 3.5–5.3)
RBC # BLD: 5.36 M/UL — SIGNIFICANT CHANGE UP (ref 4.2–5.8)
RBC # FLD: 13.9 % — SIGNIFICANT CHANGE UP (ref 10.3–14.5)
SODIUM SERPL-SCNC: 139 MMOL/L — SIGNIFICANT CHANGE UP (ref 135–145)
WBC # BLD: 7.08 K/UL — SIGNIFICANT CHANGE UP (ref 3.8–10.5)
WBC # FLD AUTO: 7.08 K/UL — SIGNIFICANT CHANGE UP (ref 3.8–10.5)

## 2019-04-26 PROCEDURE — 99233 SBSQ HOSP IP/OBS HIGH 50: CPT

## 2019-04-26 PROCEDURE — 93306 TTE W/DOPPLER COMPLETE: CPT | Mod: 26

## 2019-04-26 RX ORDER — HALOPERIDOL DECANOATE 100 MG/ML
1 INJECTION INTRAMUSCULAR ONCE
Qty: 0 | Refills: 0 | Status: DISCONTINUED | OUTPATIENT
Start: 2019-04-26 | End: 2019-04-26

## 2019-04-26 RX ORDER — CHLORPROMAZINE HCL 10 MG
25 TABLET ORAL EVERY 6 HOURS
Qty: 0 | Refills: 0 | Status: DISCONTINUED | OUTPATIENT
Start: 2019-04-26 | End: 2019-04-26

## 2019-04-26 RX ORDER — CHLORPROMAZINE HCL 10 MG
25 TABLET ORAL EVERY 6 HOURS
Qty: 0 | Refills: 0 | Status: DISCONTINUED | OUTPATIENT
Start: 2019-04-26 | End: 2019-05-01

## 2019-04-26 RX ADMIN — Medication 2 MILLIGRAM(S): at 05:47

## 2019-04-26 RX ADMIN — Medication 2 MILLIGRAM(S): at 18:02

## 2019-04-26 RX ADMIN — LISINOPRIL 5 MILLIGRAM(S): 2.5 TABLET ORAL at 05:47

## 2019-04-26 RX ADMIN — Medication 2 MILLIGRAM(S): at 02:47

## 2019-04-26 RX ADMIN — Medication 1.5 MILLIGRAM(S): at 21:47

## 2019-04-26 RX ADMIN — Medication 3 MILLIGRAM(S): at 19:03

## 2019-04-26 RX ADMIN — HALOPERIDOL DECANOATE 1 MILLIGRAM(S): 100 INJECTION INTRAMUSCULAR at 16:45

## 2019-04-26 RX ADMIN — Medication 1.5 MILLIGRAM(S): at 18:32

## 2019-04-26 RX ADMIN — Medication 1.5 MILLIGRAM(S): at 14:21

## 2019-04-26 RX ADMIN — Medication 25 MILLIGRAM(S): at 18:18

## 2019-04-26 RX ADMIN — HEPARIN SODIUM 5000 UNIT(S): 5000 INJECTION INTRAVENOUS; SUBCUTANEOUS at 05:47

## 2019-04-26 RX ADMIN — Medication 100 MILLIGRAM(S): at 12:09

## 2019-04-26 RX ADMIN — HEPARIN SODIUM 5000 UNIT(S): 5000 INJECTION INTRAVENOUS; SUBCUTANEOUS at 14:21

## 2019-04-26 RX ADMIN — Medication 1.5 MILLIGRAM(S): at 10:15

## 2019-04-26 RX ADMIN — HEPARIN SODIUM 5000 UNIT(S): 5000 INJECTION INTRAVENOUS; SUBCUTANEOUS at 21:47

## 2019-04-26 RX ADMIN — Medication 1 MILLIGRAM(S): at 12:10

## 2019-04-26 RX ADMIN — Medication 2 MILLIGRAM(S): at 16:17

## 2019-04-26 RX ADMIN — Medication 1 TABLET(S): at 12:10

## 2019-04-26 NOTE — CHART NOTE - NSCHARTNOTEFT_GEN_A_CORE
TELEMETRY NOTE    Tele reviewed. Patient intermittently on telemetry. Overnight with sinus bradycardia to 50s. Currently sinus rhythm 70s.     J. Alen  [id number]

## 2019-04-26 NOTE — PROGRESS NOTE BEHAVIORAL HEALTH - NSBHCONSULTFOLLOWDETAILS_PSY_A_CORE FT
Needs inpatient admission.    Does not have capacity to leave AMA Needs inpatient admission.    Does not have capacity to leave AMA    Professional collateral: [facility] Yenifer Ramirez ([phone])  therapist Nilda Saab

## 2019-04-26 NOTE — CHART NOTE - NSCHARTNOTEFT_GEN_A_CORE
Provider notified by nurse that patient was very agitated and wanted to leave. Patient was walking around in his room upon evaluation. Patient stated he wanted to leave and did not answer any of the provider's questions. Stated he wanted to leave and didn't need to be here. Haldol one time dose was ordered and Psych was reconsulted. They stated to give ativan as needed as the patient is actively withdrawing and would benefit from ativan. They stated the patient could also get thorazine IM q6 prn for agitation. Will continue to monitor patient.

## 2019-04-26 NOTE — PROGRESS NOTE ADULT - SUBJECTIVE AND OBJECTIVE BOX
Patient is a 53y old  Male who presents with a chief complaint of c/o overdose (25 Apr 2019 12:22)    SUBJECTIVE / OVERNIGHT EVENTS:  Patient seen denying any complaints.   Tele: Overnight with sinus bradycardia to 50s, NSR in 70s this morning      MEDICATIONS  (STANDING):  folic acid 1 milliGRAM(s) Oral daily  heparin  Injectable 5000 Unit(s) SubCutaneous every 8 hours  lisinopril 5 milliGRAM(s) Oral daily  LORazepam   Injectable 2  IV Push   LORazepam   Injectable 1.5 milliGRAM(s) IV Push every 4 hours  multivitamin/minerals 1 Tablet(s) Oral daily  sodium chloride 0.45%. 1000 milliLiter(s) (80 mL/Hr) IV Continuous <Continuous>  thiamine 100 milliGRAM(s) Oral daily    MEDICATIONS  (PRN):  LORazepam   Injectable 2 milliGRAM(s) IV Push every 1 hour PRN Symptom-triggered: each CIWA -Ar score 8 or GREATER      Vital Signs Last 24 Hrs  T(C): 36.3 (26 Apr 2019 12:43), Max: 37 (25 Apr 2019 15:00)  T(F): 97.3 (26 Apr 2019 12:43), Max: 98.6 (25 Apr 2019 15:00)  HR: 72 (26 Apr 2019 12:43) (56 - 93)  BP: 123/75 (26 Apr 2019 12:43) (107/59 - 155/95)  BP(mean): --  RR: 16 (26 Apr 2019 12:43) (15 - 18)  SpO2: 98% (26 Apr 2019 12:43) (95% - 100%)  CAPILLARY BLOOD GLUCOSE        I&O's Summary    25 Apr 2019 07:01  -  26 Apr 2019 07:00  --------------------------------------------------------  IN: 0 mL / OUT: 200 mL / NET: -200 mL          PHYSICAL EXAM  GENERAL: NAD, well-developed  HEAD:  Atraumatic, Normocephalic  EYES: EOMI, PERRLA, conjunctiva and sclera clear  NECK: Supple, No JVD  CHEST/LUNG: Clear to auscultation bilaterally; No wheeze  HEART: Regular rate and rhythm; No murmurs, rubs, or gallops  ABDOMEN: Soft, Nontender, Nondistended; Bowel sounds present  EXTREMITIES:  2+ Peripheral Pulses, No clubbing, cyanosis, or edema  PSYCH: AAOx3, no visible tremors with arm extension, no tongue fasculations  SKIN: No rashes or lesions    LABS:                        16.7   7.08  )-----------( 225      ( 26 Apr 2019 06:53 )             50.1     04-26    139  |  104  |  18  ----------------------------<  82  4.0   |  22  |  0.95    Ca    10.5      26 Apr 2019 06:53  Mg     1.9     04-25                  RADIOLOGY & ADDITIONAL TESTS:    Imaging Personally Reviewed:  Consultant(s) Notes Reviewed:    Care Discussed with Consultants/Other Providers: Discussed with Dr. Lluvia Mathis from Psych
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
## 2019-04-26 NOTE — PROGRESS NOTE BEHAVIORAL HEALTH - NSBHFUPINTERVALHXFT_PSY_A_CORE
Pt seen for f/u of alcohol withdrawal.  CIWAs 6 for disorientation, agitation, tactile disturbance, and anxiety.  Pt has not received any PRNs since last eval.  Pt initially more alert, appeared to eat most of his breakfast, stating he is feeling "OK" however when asked orientation questions he took a long time to answer "[facility]" and subsequently closed his eyes and made scratching gestures over his head. Pt seen for f/u of alcohol withdrawal.  CIWAs 6 for disorientation, agitation, tactile disturbance, and anxiety.  Pt has not received any PRNs since last eval.  Pt initially more alert, appeared to eat most of his breakfast, stating he is feeling "OK" however when asked orientation questions he took a long time to answer "[facility]" and subsequently closed his eyes and made scratching gestures over his head.    Writer contacted [facility] Yenifer Ramirez ([phone]) to speak with documented therapist Nilda Saab, however office is closed due to Passover.

## 2019-04-27 PROCEDURE — 99233 SBSQ HOSP IP/OBS HIGH 50: CPT

## 2019-04-27 RX ADMIN — LISINOPRIL 5 MILLIGRAM(S): 2.5 TABLET ORAL at 05:58

## 2019-04-27 RX ADMIN — Medication 1 MILLIGRAM(S): at 13:45

## 2019-04-27 RX ADMIN — HEPARIN SODIUM 5000 UNIT(S): 5000 INJECTION INTRAVENOUS; SUBCUTANEOUS at 05:58

## 2019-04-27 RX ADMIN — Medication 100 MILLIGRAM(S): at 13:38

## 2019-04-27 RX ADMIN — HEPARIN SODIUM 5000 UNIT(S): 5000 INJECTION INTRAVENOUS; SUBCUTANEOUS at 13:39

## 2019-04-27 RX ADMIN — Medication 1 MILLIGRAM(S): at 17:45

## 2019-04-27 RX ADMIN — HEPARIN SODIUM 5000 UNIT(S): 5000 INJECTION INTRAVENOUS; SUBCUTANEOUS at 21:26

## 2019-04-27 RX ADMIN — Medication 1.5 MILLIGRAM(S): at 02:51

## 2019-04-27 RX ADMIN — Medication 1 MILLIGRAM(S): at 09:55

## 2019-04-27 RX ADMIN — Medication 1.5 MILLIGRAM(S): at 05:58

## 2019-04-27 RX ADMIN — Medication 1 MILLIGRAM(S): at 21:26

## 2019-04-27 NOTE — PROGRESS NOTE ADULT - PROBLEM SELECTOR PLAN 1
As per toxicology, Ziprasidone can cause QTc prolongation but does not typically present with bradycardia.  Oxycodone, Gabapentin, and Meloxicam also do not typically present with bradycardia  TSH WNL  - TTE endocardium not well visualized but otherwise normal.   - Evaluated by EP, no PM indicated at this time  - continue to monitor on tele

## 2019-04-27 NOTE — PROGRESS NOTE BEHAVIORAL HEALTH - NSBHFUPINTERVALHXFT_PSY_A_CORE
Pt seen for f/u of alcohol withdrawal. He was very lethargic at time of interview so unable to cooperate full interview.  CIWA currently 4 for tremor (1) and disorientation (3). Overnight he received Thorazine 25mg at 18:18 for agitation.  On interview he is very lethargic, though briefly opened his eyes. He is oriented to his name/hospital/hospital name/month. He is on 1:1. Pt seen for f/u of alcohol withdrawal. He was very lethargic at time of interview so unable to cooperate full interview.  CIWA currently 4 for tremor (1) and disorientation (3). Overnight he received Thorazine 25mg at 18:18 for agitation.  On interview he is very lethargic, though briefly opened his eyes. He is oriented to his name/hospital/hospital name/month. He is on 1:1. He is S/p SA.
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
## 2019-04-27 NOTE — PROGRESS NOTE ADULT - SUBJECTIVE AND OBJECTIVE BOX
Patient is a 53y old  Male who presents with a chief complaint of c/o overdose (26 Apr 2019 14:07)    SUBJECTIVE / OVERNIGHT EVENTS:    Pt was agitated overnight. Was given thorazine x1. Sleepy this morning but arousabile and walked to bathroom with assistance. No complaints this am. CIWA improved overall, 7's mainly for disorientation.     MEDICATIONS  (STANDING):  folic acid 1 milliGRAM(s) Oral daily  heparin  Injectable 5000 Unit(s) SubCutaneous every 8 hours  lisinopril 5 milliGRAM(s) Oral daily  LORazepam   Injectable 2  IV Push   LORazepam   Injectable 1 milliGRAM(s) IV Push every 4 hours  multivitamin/minerals 1 Tablet(s) Oral daily  sodium chloride 0.45%. 1000 milliLiter(s) (80 mL/Hr) IV Continuous <Continuous>  thiamine 100 milliGRAM(s) Oral daily    MEDICATIONS  (PRN):  chlorproMAZINE    Injectable 25 milliGRAM(s) IntraMuscular every 6 hours PRN Agitation  LORazepam   Injectable 2 milliGRAM(s) IV Push every 1 hour PRN Symptom-triggered: each CIWA -Ar score 8 or GREATER        CAPILLARY BLOOD GLUCOSE        I&O's Summary  Vital Signs Last 24 Hrs  T(C): 36.2 (27 Apr 2019 10:36), Max: 36.4 (26 Apr 2019 22:39)  T(F): 97.2 (27 Apr 2019 10:36), Max: 97.6 (27 Apr 2019 01:45)  HR: 77 (27 Apr 2019 10:36) (59 - 98)  BP: 137/92 (27 Apr 2019 10:36) (108/58 - 152/97)  BP(mean): --  RR: 19 (27 Apr 2019 10:36) (16 - 19)  SpO2: 100% (27 Apr 2019 10:36) (95% - 100%)    PHYSICAL EXAM  GENERAL: NAD, well-developed  HEAD:  Atraumatic, Normocephalic  EYES: EOMI, PERRLA, conjunctiva and sclera clear  NECK: Supple, No JVD  CHEST/LUNG: Clear to auscultation bilaterally; No wheeze  HEART: Regular rate and rhythm; No murmurs, rubs, or gallops  ABDOMEN: Soft, Nontender, Nondistended; Bowel sounds present  EXTREMITIES:  2+ Peripheral Pulses, No clubbing, cyanosis, or edema  PSYCH: AAOx3, no visible tremors with arm extension,  SKIN: No rashes or lesions    LABS:                        16.7   7.08  )-----------( 225      ( 26 Apr 2019 06:53 )             50.1     04-26    139  |  104  |  18  ----------------------------<  82  4.0   |  22  |  0.95    Ca    10.5      26 Apr 2019 06:53                RADIOLOGY & ADDITIONAL TESTS:    Imaging Personally Reviewed:  Consultant(s) Notes Reviewed:  Psych  Care Discussed with Consultants/Other Providers: Psych

## 2019-04-27 NOTE — PROGRESS NOTE BEHAVIORAL HEALTH - NSBHCONSULTFOLLOWDETAILS_PSY_A_CORE FT
Needs inpatient admission.    Does not have capacity to leave AMA    Professional collateral: [facility] Yenifer Ramirez ([phone])  therapist Nilda Saab

## 2019-04-27 NOTE — PROGRESS NOTE ADULT - PROBLEM SELECTOR PLAN 4
Low suspicion for ACS, EKG without ST/Twave changes, indeterminant troponin trended down  - TTE wnl  - monitor on tele

## 2019-04-28 LAB
ANION GAP SERPL CALC-SCNC: 12 MMO/L — SIGNIFICANT CHANGE UP (ref 7–14)
BUN SERPL-MCNC: 18 MG/DL — SIGNIFICANT CHANGE UP (ref 7–23)
CALCIUM SERPL-MCNC: 10.6 MG/DL — HIGH (ref 8.4–10.5)
CHLORIDE SERPL-SCNC: 103 MMOL/L — SIGNIFICANT CHANGE UP (ref 98–107)
CO2 SERPL-SCNC: 23 MMOL/L — SIGNIFICANT CHANGE UP (ref 22–31)
CREAT SERPL-MCNC: 0.91 MG/DL — SIGNIFICANT CHANGE UP (ref 0.5–1.3)
GLUCOSE SERPL-MCNC: 101 MG/DL — HIGH (ref 70–99)
HCT VFR BLD CALC: 47.9 % — SIGNIFICANT CHANGE UP (ref 39–50)
HGB BLD-MCNC: 16.1 G/DL — SIGNIFICANT CHANGE UP (ref 13–17)
MAGNESIUM SERPL-MCNC: 1.9 MG/DL — SIGNIFICANT CHANGE UP (ref 1.6–2.6)
MCHC RBC-ENTMCNC: 31.4 PG — SIGNIFICANT CHANGE UP (ref 27–34)
MCHC RBC-ENTMCNC: 33.6 % — SIGNIFICANT CHANGE UP (ref 32–36)
MCV RBC AUTO: 93.4 FL — SIGNIFICANT CHANGE UP (ref 80–100)
NRBC # FLD: 0 K/UL — SIGNIFICANT CHANGE UP (ref 0–0)
PLATELET # BLD AUTO: 201 K/UL — SIGNIFICANT CHANGE UP (ref 150–400)
PMV BLD: 11.8 FL — SIGNIFICANT CHANGE UP (ref 7–13)
POTASSIUM SERPL-MCNC: 4 MMOL/L — SIGNIFICANT CHANGE UP (ref 3.5–5.3)
POTASSIUM SERPL-SCNC: 4 MMOL/L — SIGNIFICANT CHANGE UP (ref 3.5–5.3)
RBC # BLD: 5.13 M/UL — SIGNIFICANT CHANGE UP (ref 4.2–5.8)
RBC # FLD: 14 % — SIGNIFICANT CHANGE UP (ref 10.3–14.5)
SODIUM SERPL-SCNC: 138 MMOL/L — SIGNIFICANT CHANGE UP (ref 135–145)
WBC # BLD: 7.06 K/UL — SIGNIFICANT CHANGE UP (ref 3.8–10.5)
WBC # FLD AUTO: 7.06 K/UL — SIGNIFICANT CHANGE UP (ref 3.8–10.5)

## 2019-04-28 PROCEDURE — 99233 SBSQ HOSP IP/OBS HIGH 50: CPT

## 2019-04-28 RX ORDER — SODIUM CHLORIDE 9 MG/ML
1000 INJECTION, SOLUTION INTRAVENOUS
Qty: 0 | Refills: 0 | Status: DISCONTINUED | OUTPATIENT
Start: 2019-04-28 | End: 2019-05-03

## 2019-04-28 RX ADMIN — Medication 2 MILLIGRAM(S): at 12:52

## 2019-04-28 RX ADMIN — Medication 1 MILLIGRAM(S): at 05:23

## 2019-04-28 RX ADMIN — Medication 25 MILLIGRAM(S): at 12:22

## 2019-04-28 RX ADMIN — Medication 2 MILLIGRAM(S): at 11:14

## 2019-04-28 RX ADMIN — HEPARIN SODIUM 5000 UNIT(S): 5000 INJECTION INTRAVENOUS; SUBCUTANEOUS at 05:23

## 2019-04-28 RX ADMIN — Medication 1 TABLET(S): at 12:25

## 2019-04-28 RX ADMIN — Medication 1 MILLIGRAM(S): at 12:25

## 2019-04-28 RX ADMIN — Medication 1 MILLIGRAM(S): at 18:09

## 2019-04-28 RX ADMIN — Medication 2 MILLIGRAM(S): at 13:57

## 2019-04-28 RX ADMIN — Medication 2 MILLIGRAM(S): at 20:08

## 2019-04-28 RX ADMIN — Medication 1 MILLIGRAM(S): at 01:14

## 2019-04-28 RX ADMIN — Medication 2 MILLIGRAM(S): at 22:18

## 2019-04-28 RX ADMIN — Medication 100 MILLIGRAM(S): at 12:25

## 2019-04-28 RX ADMIN — HEPARIN SODIUM 5000 UNIT(S): 5000 INJECTION INTRAVENOUS; SUBCUTANEOUS at 22:18

## 2019-04-28 RX ADMIN — LISINOPRIL 5 MILLIGRAM(S): 2.5 TABLET ORAL at 05:23

## 2019-04-28 NOTE — CHART NOTE - NSCHARTNOTEFT_GEN_A_CORE
Notified by RN that patient agitated and scoring CIWA score of 12.  Thorazine & ativan PRN given as ordered.  Psychiatry called to follow up today.  Psychiatry's verbal recommendations were to continue ativan taper as follows: 1mg IV q6h x 4 doses, then ativan 1mg IV BID x 2 doses, then stop.  Will continue to monitor closely.

## 2019-04-28 NOTE — PROGRESS NOTE BEHAVIORAL HEALTH - NSBHFUPINTERVALHXFT_PSY_A_CORE
Pt seen for f/u of alcohol withdrawal. He appears disoriented, getting out of bed, they laying back down and rubbing his hands over his arms and body repeatedly. He is not able to provide meaningful responses. He reports "seeing things" but cannot elaborate. Patient received last dose of Ativan 1 mg q4h this morning and recently received additional Ativan 2 mg for CIWA and Thorozine for agitation. Patient appears confused but is able to say "I don't want Thorazine." He is on 1:1. He is S/p SA.

## 2019-04-28 NOTE — PROGRESS NOTE ADULT - SUBJECTIVE AND OBJECTIVE BOX
Patient is a 53y old  Male who presents with a chief complaint of c/o overdose (27 Apr 2019 10:45)    SUBJECTIVE / OVERNIGHT EVENTS:    No acute events overnight. This morning patient agitated, angry wanting to leave. Was given 2mg ativan. Has completed taper.     MEDICATIONS  (STANDING):  folic acid 1 milliGRAM(s) Oral daily  heparin  Injectable 5000 Unit(s) SubCutaneous every 8 hours  lactated ringers. 1000 milliLiter(s) (100 mL/Hr) IV Continuous <Continuous>  lisinopril 5 milliGRAM(s) Oral daily  multivitamin 1 Tablet(s) Oral daily  thiamine 100 milliGRAM(s) Oral daily    MEDICATIONS  (PRN):  chlorproMAZINE    Injectable 25 milliGRAM(s) IntraMuscular every 6 hours PRN Agitation  LORazepam   Injectable 2 milliGRAM(s) IV Push every 1 hour PRN Symptom-triggered: each CIWA -Ar score 8 or GREATER        CAPILLARY BLOOD GLUCOSE        I&O's Summary    27 Apr 2019 07:01  -  28 Apr 2019 07:00  --------------------------------------------------------  IN: 0 mL / OUT: 150 mL / NET: -150 mL    Vital Signs Last 24 Hrs  T(C): 37 (28 Apr 2019 05:10), Max: 37 (28 Apr 2019 05:10)  T(F): 98.6 (28 Apr 2019 05:10), Max: 98.6 (28 Apr 2019 05:10)  HR: 56 (28 Apr 2019 11:52) (50 - 76)  BP: 97/67 (28 Apr 2019 11:52) (97/67 - 140/61)  BP(mean): --  RR: 18 (28 Apr 2019 11:52) (17 - 20)  SpO2: 100% (28 Apr 2019 11:52) (96% - 100%)    PHYSICAL EXAM  GENERAL: NAD, well-developed, agitated  HEAD:  Atraumatic, Normocephalic  EYES: EOMI, PERRLA, conjunctiva and sclera clear  NECK: Supple, No JVD  CHEST/LUNG: Clear to auscultation bilaterally; No wheeze  HEART: Regular rate and rhythm; No murmurs, rubs, or gallops  ABDOMEN: Soft, Nontender, Nondistended; Bowel sounds present  EXTREMITIES:  2+ Peripheral Pulses, No clubbing, cyanosis, or edema  PSYCH: AAOx3, no visible tremors with arm extension,  SKIN: No rashes or lesions    LABS:                        16.1   7.06  )-----------( 201      ( 28 Apr 2019 06:48 )             47.9     04-28    138  |  103  |  18  ----------------------------<  101<H>  4.0   |  23  |  0.91    Ca    10.6<H>      28 Apr 2019 06:48  Mg     1.9     04-28                RADIOLOGY & ADDITIONAL TESTS:    Imaging Personally Reviewed:  Consultant(s) Notes Reviewed:    Care Discussed with Consultants/Other Providers: psych

## 2019-04-28 NOTE — PROGRESS NOTE BEHAVIORAL HEALTH - NSBHCONSULTFOLLOWDETAILS_PSY_A_CORE FT
Needs inpatient admission.    Does not have capacity to leave AMA    Professional collateral: [facility] Yenifer Raimrez ([phone])  therapist Nilda Saab
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
## 2019-04-29 LAB
ANION GAP SERPL CALC-SCNC: 13 MMO/L — SIGNIFICANT CHANGE UP (ref 7–14)
BUN SERPL-MCNC: 18 MG/DL — SIGNIFICANT CHANGE UP (ref 7–23)
CALCIUM SERPL-MCNC: 10.8 MG/DL — HIGH (ref 8.4–10.5)
CHLORIDE SERPL-SCNC: 103 MMOL/L — SIGNIFICANT CHANGE UP (ref 98–107)
CO2 SERPL-SCNC: 23 MMOL/L — SIGNIFICANT CHANGE UP (ref 22–31)
CREAT SERPL-MCNC: 0.94 MG/DL — SIGNIFICANT CHANGE UP (ref 0.5–1.3)
GLUCOSE BLDC GLUCOMTR-MCNC: 103 MG/DL — HIGH (ref 70–99)
GLUCOSE BLDC GLUCOMTR-MCNC: 99 MG/DL — SIGNIFICANT CHANGE UP (ref 70–99)
GLUCOSE SERPL-MCNC: 91 MG/DL — SIGNIFICANT CHANGE UP (ref 70–99)
HCT VFR BLD CALC: 48.5 % — SIGNIFICANT CHANGE UP (ref 39–50)
HGB BLD-MCNC: 16.2 G/DL — SIGNIFICANT CHANGE UP (ref 13–17)
MAGNESIUM SERPL-MCNC: 1.9 MG/DL — SIGNIFICANT CHANGE UP (ref 1.6–2.6)
MCHC RBC-ENTMCNC: 31.3 PG — SIGNIFICANT CHANGE UP (ref 27–34)
MCHC RBC-ENTMCNC: 33.4 % — SIGNIFICANT CHANGE UP (ref 32–36)
MCV RBC AUTO: 93.8 FL — SIGNIFICANT CHANGE UP (ref 80–100)
NRBC # FLD: 0 K/UL — SIGNIFICANT CHANGE UP (ref 0–0)
PLATELET # BLD AUTO: 214 K/UL — SIGNIFICANT CHANGE UP (ref 150–400)
PMV BLD: 11.9 FL — SIGNIFICANT CHANGE UP (ref 7–13)
POTASSIUM SERPL-MCNC: 4 MMOL/L — SIGNIFICANT CHANGE UP (ref 3.5–5.3)
POTASSIUM SERPL-SCNC: 4 MMOL/L — SIGNIFICANT CHANGE UP (ref 3.5–5.3)
RBC # BLD: 5.17 M/UL — SIGNIFICANT CHANGE UP (ref 4.2–5.8)
RBC # FLD: 13.8 % — SIGNIFICANT CHANGE UP (ref 10.3–14.5)
SODIUM SERPL-SCNC: 139 MMOL/L — SIGNIFICANT CHANGE UP (ref 135–145)
WBC # BLD: 6.88 K/UL — SIGNIFICANT CHANGE UP (ref 3.8–10.5)
WBC # FLD AUTO: 6.88 K/UL — SIGNIFICANT CHANGE UP (ref 3.8–10.5)

## 2019-04-29 PROCEDURE — 99233 SBSQ HOSP IP/OBS HIGH 50: CPT

## 2019-04-29 PROCEDURE — 99223 1ST HOSP IP/OBS HIGH 75: CPT | Mod: GC

## 2019-04-29 RX ADMIN — Medication 100 MILLIGRAM(S): at 12:19

## 2019-04-29 RX ADMIN — Medication 2 MILLIGRAM(S): at 22:16

## 2019-04-29 RX ADMIN — Medication 2 MILLIGRAM(S): at 18:06

## 2019-04-29 RX ADMIN — HEPARIN SODIUM 5000 UNIT(S): 5000 INJECTION INTRAVENOUS; SUBCUTANEOUS at 22:16

## 2019-04-29 RX ADMIN — Medication 2 MILLIGRAM(S): at 19:12

## 2019-04-29 RX ADMIN — Medication 1 MILLIGRAM(S): at 12:19

## 2019-04-29 RX ADMIN — Medication 2 MILLIGRAM(S): at 17:45

## 2019-04-29 RX ADMIN — Medication 2 MILLIGRAM(S): at 01:44

## 2019-04-29 RX ADMIN — Medication 2 MILLIGRAM(S): at 02:53

## 2019-04-29 RX ADMIN — Medication 25 MILLIGRAM(S): at 17:54

## 2019-04-29 RX ADMIN — Medication 2 MILLIGRAM(S): at 20:10

## 2019-04-29 RX ADMIN — Medication 1 MILLIGRAM(S): at 06:15

## 2019-04-29 RX ADMIN — Medication 2 MILLIGRAM(S): at 07:54

## 2019-04-29 RX ADMIN — LISINOPRIL 5 MILLIGRAM(S): 2.5 TABLET ORAL at 06:15

## 2019-04-29 RX ADMIN — Medication 1 MILLIGRAM(S): at 00:20

## 2019-04-29 RX ADMIN — HEPARIN SODIUM 5000 UNIT(S): 5000 INJECTION INTRAVENOUS; SUBCUTANEOUS at 06:15

## 2019-04-29 RX ADMIN — Medication 1 MILLIGRAM(S): at 12:16

## 2019-04-29 RX ADMIN — Medication 1 TABLET(S): at 12:19

## 2019-04-29 NOTE — PROGRESS NOTE ADULT - SUBJECTIVE AND OBJECTIVE BOX
Patient is a 53y old  Male who presents with a chief complaint of c/o overdose (28 Apr 2019 12:27)    SUBJECTIVE / OVERNIGHT EVENTS:  Requiring multiple PRN ativan and 1 dose of 25 mg IM Thorazine over the last 24 hrs.  Patient seen this morning denying any complaints.     MEDICATIONS  (STANDING):  folic acid 1 milliGRAM(s) Oral daily  heparin  Injectable 5000 Unit(s) SubCutaneous every 8 hours  lactated ringers. 1000 milliLiter(s) (100 mL/Hr) IV Continuous <Continuous>  lisinopril 5 milliGRAM(s) Oral daily  LORazepam   Injectable   IV Push   multivitamin 1 Tablet(s) Oral daily  thiamine 100 milliGRAM(s) Oral daily    MEDICATIONS  (PRN):  chlorproMAZINE    Injectable 25 milliGRAM(s) IntraMuscular every 6 hours PRN Agitation  LORazepam   Injectable 2 milliGRAM(s) IV Push every 2 hours PRN CIWA-Ar score increase by 2 points and a total score of 7 or less  LORazepam   Injectable 2 milliGRAM(s) IV Push every 1 hour PRN CIWA-Ar score 8 or greater      Vital Signs Last 24 Hrs  T(C): 36.3 (29 Apr 2019 13:40), Max: 36.6 (28 Apr 2019 19:02)  T(F): 97.4 (29 Apr 2019 13:40), Max: 97.8 (28 Apr 2019 19:02)  HR: 78 (29 Apr 2019 13:40) (50 - 87)  BP: 137/80 (29 Apr 2019 13:40) (114/78 - 152/106)  BP(mean): --  RR: 18 (29 Apr 2019 13:40) (16 - 18)  SpO2: 100% (29 Apr 2019 13:40) (96% - 100%)  CAPILLARY BLOOD GLUCOSE        I&O's Summary        PHYSICAL EXAM  GENERAL: NAD, well-developed  HEAD:  Atraumatic, Normocephalic  EYES: EOMI, PERRLA, conjunctiva and sclera clear  NECK: Supple, No JVD  CHEST/LUNG: Clear to auscultation bilaterally; No wheeze  HEART: Regular rate and rhythm; No murmurs, rubs, or gallops  ABDOMEN: Soft, Nontender, Nondistended; Bowel sounds present  EXTREMITIES:  2+ Peripheral Pulses, No clubbing, cyanosis, or edema  PSYCH: AAOx3  SKIN: No rashes or lesions    LABS:                        16.2   6.88  )-----------( 214      ( 29 Apr 2019 06:30 )             48.5     04-29    139  |  103  |  18  ----------------------------<  91  4.0   |  23  |  0.94    Ca    10.8<H>      29 Apr 2019 06:30  Mg     1.9     04-29                  RADIOLOGY & ADDITIONAL TESTS:    Imaging Personally Reviewed:  Consultant(s) Notes Reviewed:    Care Discussed with Consultants/Other Providers:

## 2019-04-29 NOTE — CHART NOTE - NSCHARTNOTEFT_GEN_A_CORE
d/w psychiatry- ativan taper increased as outlined in their progress note .   continue to monitor closely

## 2019-04-29 NOTE — CHART NOTE - NSCHARTNOTEFT_GEN_A_CORE
Informed by nurse that patient was severely agitated with a CIWA score of 15. Per Nurse patient was trying to leave and was being held by security. Patient received 2mg Ativan PRN for CIWA-Ar score greater than 8 at 18:00 and also received another dose at 19:00. Patient also received Chlorpromazine 25mg IM for agitation at 17:00.    At bedside patient was laying in bed stating that he is "wound up". Patient appeared restless. Patient was unable to answer the question of where he was. Patient was redirectable and was told to take a nap.    Today patient's ativan taper dosage was increased by Behavioral health.   ICU consult called.  Will continue to closely monitor.    Patient's vitals  Vital Signs Last 24 Hrs  T(C): 36.1 (29 Apr 2019 18:51), Max: 36.6 (28 Apr 2019 20:03)  T(F): 97 (29 Apr 2019 18:51), Max: 97.8 (28 Apr 2019 20:03)  HR: 88 (29 Apr 2019 18:51) (50 - 88)  BP: 131/80 (29 Apr 2019 18:51) (114/78 - 152/106)  BP(mean): --  RR: 18 (29 Apr 2019 18:51) (16 - 18)  SpO2: 99% (29 Apr 2019 18:51) (96% - 100%)

## 2019-04-29 NOTE — CONSULT NOTE ADULT - ATTENDING COMMENTS
53yM HTN, DM2, asthma, COPD, chronic back pain, schizoaffective d/o bipolar type, ALEJANDRO on CPAP admitted for bradycardia and suicide attempt by ingestion. Now with increasing CIWA 2/2 Agitation. Unclear if ETOH withdrawal, pt currently not tachycardic or tremulous. Continue to monitor for any signs of withdrawal  psych f/u    Does not currently require MICU care, please reconsult as necessary and call with any questions/concerns

## 2019-04-29 NOTE — PROGRESS NOTE BEHAVIORAL HEALTH - NSBHFUPINTERVALHXFT_PSY_A_CORE
Patient is lying in bed and restlessly rubbing his face and scratching himself. He reports he feels "not good" because "they keep sticking needles in me." Asked about events that brought him into the hospital, states "I OD'ed." Confirms the overdose was a suicide attempt. Nods his head yes when asked if he is seeing anything unusual but does not give specifics. Gives full name, location ("hospital"), situation, and month. Is able to choose the year correctly out of two options. Stops answering questions and continues to restlessly scratch himself and rub his head.

## 2019-04-29 NOTE — PROGRESS NOTE BEHAVIORAL HEALTH - OTHER
unsteady unable to assess s/p SA suspect tactile hallucinations goal directed but overall concrete and disorganized

## 2019-04-29 NOTE — CONSULT NOTE ADULT - ASSESSMENT
A: 53yM HTN, DM2, asthma, COPD, chronic back pain, schizoaffective d/o bipolar type, ALEJANDRO on CPAP admitted for bradycardia and suicide attempt by ingestion    P:  #Agitation- unsure if agitation is 2/2 ETOH withdrawal or simply underlying psych issues as patient provides no clear history of ETOH dependence and no hx of ETOH withdrawal sx, even when agitated in hospital does not appear to have been hypertensive/tachycardic as one would expect with ETOH withdrawal. Patient could also be having mild withdrawal from other substances such as opiates that could be contributory.   - Would continue with current plan for ativan/thorazine prn, consider increasing dose if vitals tolerate  - If suspicion still high for ETOH withdrawal can consider phenobarb  - Does not currently require MICU care, please reconsult as necessary and call with any questions/concerns
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
## 2019-04-29 NOTE — CHART NOTE - NSCHARTNOTEFT_GEN_A_CORE
Informed by nurse that patient was agitated. Lorazepam given per taper dose. Will continue to monitor.

## 2019-04-30 LAB
ANION GAP SERPL CALC-SCNC: 13 MMO/L — SIGNIFICANT CHANGE UP (ref 7–14)
BUN SERPL-MCNC: 14 MG/DL — SIGNIFICANT CHANGE UP (ref 7–23)
CALCIUM SERPL-MCNC: 10.5 MG/DL — SIGNIFICANT CHANGE UP (ref 8.4–10.5)
CHLORIDE SERPL-SCNC: 104 MMOL/L — SIGNIFICANT CHANGE UP (ref 98–107)
CO2 SERPL-SCNC: 23 MMOL/L — SIGNIFICANT CHANGE UP (ref 22–31)
CREAT SERPL-MCNC: 0.87 MG/DL — SIGNIFICANT CHANGE UP (ref 0.5–1.3)
GLUCOSE SERPL-MCNC: 99 MG/DL — SIGNIFICANT CHANGE UP (ref 70–99)
HCT VFR BLD CALC: 46.7 % — SIGNIFICANT CHANGE UP (ref 39–50)
HGB BLD-MCNC: 15.7 G/DL — SIGNIFICANT CHANGE UP (ref 13–17)
MAGNESIUM SERPL-MCNC: 1.8 MG/DL — SIGNIFICANT CHANGE UP (ref 1.6–2.6)
MCHC RBC-ENTMCNC: 31.5 PG — SIGNIFICANT CHANGE UP (ref 27–34)
MCHC RBC-ENTMCNC: 33.6 % — SIGNIFICANT CHANGE UP (ref 32–36)
MCV RBC AUTO: 93.8 FL — SIGNIFICANT CHANGE UP (ref 80–100)
NRBC # FLD: 0 K/UL — SIGNIFICANT CHANGE UP (ref 0–0)
PLATELET # BLD AUTO: 198 K/UL — SIGNIFICANT CHANGE UP (ref 150–400)
PMV BLD: 11.6 FL — SIGNIFICANT CHANGE UP (ref 7–13)
POTASSIUM SERPL-MCNC: 4 MMOL/L — SIGNIFICANT CHANGE UP (ref 3.5–5.3)
POTASSIUM SERPL-SCNC: 4 MMOL/L — SIGNIFICANT CHANGE UP (ref 3.5–5.3)
RBC # BLD: 4.98 M/UL — SIGNIFICANT CHANGE UP (ref 4.2–5.8)
RBC # FLD: 13.8 % — SIGNIFICANT CHANGE UP (ref 10.3–14.5)
SODIUM SERPL-SCNC: 140 MMOL/L — SIGNIFICANT CHANGE UP (ref 135–145)
WBC # BLD: 6.47 K/UL — SIGNIFICANT CHANGE UP (ref 3.8–10.5)
WBC # FLD AUTO: 6.47 K/UL — SIGNIFICANT CHANGE UP (ref 3.8–10.5)

## 2019-04-30 PROCEDURE — 99233 SBSQ HOSP IP/OBS HIGH 50: CPT

## 2019-04-30 RX ORDER — ZIPRASIDONE HYDROCHLORIDE 20 MG/1
20 CAPSULE ORAL EVERY 12 HOURS
Qty: 0 | Refills: 0 | Status: DISCONTINUED | OUTPATIENT
Start: 2019-04-30 | End: 2019-05-01

## 2019-04-30 RX ADMIN — Medication 100 MILLIGRAM(S): at 12:03

## 2019-04-30 RX ADMIN — HEPARIN SODIUM 5000 UNIT(S): 5000 INJECTION INTRAVENOUS; SUBCUTANEOUS at 14:27

## 2019-04-30 RX ADMIN — ZIPRASIDONE HYDROCHLORIDE 20 MILLIGRAM(S): 20 CAPSULE ORAL at 17:23

## 2019-04-30 RX ADMIN — Medication 2 MILLIGRAM(S): at 02:10

## 2019-04-30 RX ADMIN — Medication 2 MILLIGRAM(S): at 14:27

## 2019-04-30 RX ADMIN — HEPARIN SODIUM 5000 UNIT(S): 5000 INJECTION INTRAVENOUS; SUBCUTANEOUS at 05:21

## 2019-04-30 RX ADMIN — Medication 2 MILLIGRAM(S): at 10:04

## 2019-04-30 RX ADMIN — Medication 2 MILLIGRAM(S): at 05:21

## 2019-04-30 RX ADMIN — Medication 2 MILLIGRAM(S): at 03:40

## 2019-04-30 RX ADMIN — Medication 1.5 MILLIGRAM(S): at 22:59

## 2019-04-30 RX ADMIN — Medication 2 MILLIGRAM(S): at 17:50

## 2019-04-30 RX ADMIN — Medication 1 TABLET(S): at 12:03

## 2019-04-30 RX ADMIN — Medication 25 MILLIGRAM(S): at 18:13

## 2019-04-30 RX ADMIN — Medication 1.5 MILLIGRAM(S): at 17:23

## 2019-04-30 RX ADMIN — LISINOPRIL 5 MILLIGRAM(S): 2.5 TABLET ORAL at 05:21

## 2019-04-30 RX ADMIN — HEPARIN SODIUM 5000 UNIT(S): 5000 INJECTION INTRAVENOUS; SUBCUTANEOUS at 22:58

## 2019-04-30 RX ADMIN — Medication 1 MILLIGRAM(S): at 12:03

## 2019-04-30 NOTE — CHART NOTE - NSCHARTNOTEFT_GEN_A_CORE
Tele reviewed:     Sinus with rates in the 70s and 80s.     Overnight rate went as low at 59. No pauses.     No hypoxic episodes.     EP to sign off at this time, please call back with any further questions. Thank you for this consult.     Margarito Black MD  Cardiology Fellow - PGY-4  [id number]

## 2019-04-30 NOTE — PROGRESS NOTE BEHAVIORAL HEALTH - NSBHFUPINTERVALHXFT_PSY_A_CORE
Patient is lying in bed and sedated. Does not arouse for interview to voice or light touch. Per staff at bedside, patient was very agitated while awake. Patient is lying in bed and sedated. Does not arouse for interview to voice or light touch. Per staff at bedside, patient was very agitated while awake.    Pt.'s wife: Ms. Braden : [phone] Patient is lying in bed and sedated. Does not arouse for interview to voice or light touch. Per staff at bedside, patient was very agitated while awake.    Update: spoke with pt.'s wife: Ms. Braden at [phone]: she stated that she is trying to move out from [place], they are not together currently, she did not see him atleast for the past one year, pt. usually gets very anxious in the hospital, usually he is compliant with his medications, h/o SA via drinking bleach 15 years ago.

## 2019-04-30 NOTE — CHART NOTE - NSCHARTNOTEFT_GEN_A_CORE
Pt became increasingly agitated, began screaming and trying to get out of bed. CIWA score of 8. Given 1.5mg Ativan. Called ED psych, who gave OK to give his 25mg IM throrazine early. Pt now calm, lying in bed. Continuing to monitor closely.

## 2019-04-30 NOTE — PROGRESS NOTE ADULT - PROBLEM SELECTOR PLAN 1
As per toxicology, Ziprasidone can cause QTc prolongation but does not typically present with bradycardia.  Oxycodone, Gabapentin, and Meloxicam also do not typically present with bradycardia  TSH WNL, HR improved on tele  - TTE endocardium not well visualized but otherwise normal.   - Evaluated by EP, no PM indicated at this time  - continue to monitor

## 2019-04-30 NOTE — PROGRESS NOTE ADULT - SUBJECTIVE AND OBJECTIVE BOX
Patient is a 53y old  Male who presents with a chief complaint of c/o overdose (29 Apr 2019 20:30)      SUBJECTIVE / OVERNIGHT EVENTS:  Yesterday afternoon patient was trying to leave and was being held by security. Patient received 2mg Ativan PRN for CIWA-Ar score greater than 8 at 18:00 and also received another dose at 19:00. Patient also received Chlorpromazine 25mg IM for agitation at 17:00. At bedside patient was laying in bed stating that he is "wound up". Patient appeared restless. Patient was unable to answer the question of where he was.  This morning when examined, patient was very somnolent, denied any complaints.    Tele reviewed: Sinus with rates in the 70s and 80s. Overnight rate went as low at 59. No pauses.    MEDICATIONS  (STANDING):  folic acid 1 milliGRAM(s) Oral daily  heparin  Injectable 5000 Unit(s) SubCutaneous every 8 hours  lactated ringers. 1000 milliLiter(s) (100 mL/Hr) IV Continuous <Continuous>  lisinopril 5 milliGRAM(s) Oral daily  LORazepam   Injectable   IV Push   LORazepam   Injectable 2 milliGRAM(s) IV Push every 4 hours  LORazepam   Injectable 1.5 milliGRAM(s) IV Push every 4 hours  multivitamin 1 Tablet(s) Oral daily  thiamine 100 milliGRAM(s) Oral daily  ziprasidone 20 milliGRAM(s) Oral every 12 hours    MEDICATIONS  (PRN):  chlorproMAZINE    Injectable 25 milliGRAM(s) IntraMuscular every 6 hours PRN Agitation  LORazepam   Injectable 2 milliGRAM(s) IV Push every 2 hours PRN CIWA-Ar score increase by 2 points and a total score of 7 or less  LORazepam   Injectable 2 milliGRAM(s) IV Push every 1 hour PRN CIWA-Ar score 8 or greater      Vital Signs Last 24 Hrs  T(C): 36.3 (30 Apr 2019 13:08), Max: 36.5 (30 Apr 2019 07:00)  T(F): 97.4 (30 Apr 2019 13:08), Max: 97.7 (30 Apr 2019 07:00)  HR: 77 (30 Apr 2019 13:08) (56 - 90)  BP: 137/93 (30 Apr 2019 13:08) (121/75 - 149/94)  BP(mean): --  RR: 18 (30 Apr 2019 13:08) (16 - 18)  SpO2: 100% (30 Apr 2019 13:08) (95% - 100%)  CAPILLARY BLOOD GLUCOSE      POCT Blood Glucose.: 99 mg/dL (29 Apr 2019 22:57)  POCT Blood Glucose.: 103 mg/dL (29 Apr 2019 17:54)    I&O's Summary    29 Apr 2019 07:01  -  30 Apr 2019 07:00  --------------------------------------------------------  IN: 0 mL / OUT: 450 mL / NET: -450 mL          PHYSICAL EXAM  GENERAL: NAD, well-developed  HEAD:  Atraumatic, Normocephalic  EYES: EOMI, PERRLA, conjunctiva and sclera clear  NECK: Supple, No JVD  CHEST/LUNG: Clear to auscultation bilaterally; No wheeze  HEART: Regular rate and rhythm; No murmurs, rubs, or gallops  ABDOMEN: Soft, Nontender, Nondistended; Bowel sounds present  EXTREMITIES:  2+ Peripheral Pulses, No clubbing, cyanosis, or edema  PSYCH: AAOx2 (person, place), very somnolent  SKIN: No rashes or lesions    LABS:                        15.7   6.47  )-----------( 198      ( 30 Apr 2019 06:49 )             46.7     04-30    140  |  104  |  14  ----------------------------<  99  4.0   |  23  |  0.87    Ca    10.5      30 Apr 2019 06:49  Mg     1.8     04-30                  RADIOLOGY & ADDITIONAL TESTS:    Imaging Personally Reviewed:  Consultant(s) Notes Reviewed:    Care Discussed with Consultants/Other Providers: Discussed case with Dr. Mathis

## 2019-04-30 NOTE — CHART NOTE - NSCHARTNOTEFT_GEN_A_CORE
PA note  called to code gray. Pt trying to leave and hitting staff. CIWA score noted to be 11 . Pt given 3.5 mg of ativan and 25 mg of Thorazine IM x 1.   Pt in 4 pt leather restraints. Pt with 1 to 1 . VS  /89 P 94 R 18 . Pt calm and resting with eyes closed. Pt easy arousable with verbal stimuli.  Julianna PadillaC

## 2019-04-30 NOTE — PROGRESS NOTE ADULT - PROBLEM SELECTOR PLAN 6
Psych following  - Restarting lower dose of geodon as per psych  - Patient will require inpatient psych admission after medically optimized
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
## 2019-05-01 ENCOUNTER — TRANSCRIPTION ENCOUNTER (OUTPATIENT)
Age: 54
End: 2019-05-01

## 2019-05-01 LAB
ANION GAP SERPL CALC-SCNC: 15 MMO/L — HIGH (ref 7–14)
BUN SERPL-MCNC: 18 MG/DL — SIGNIFICANT CHANGE UP (ref 7–23)
CALCIUM SERPL-MCNC: 10.9 MG/DL — HIGH (ref 8.4–10.5)
CHLORIDE SERPL-SCNC: 102 MMOL/L — SIGNIFICANT CHANGE UP (ref 98–107)
CO2 SERPL-SCNC: 22 MMOL/L — SIGNIFICANT CHANGE UP (ref 22–31)
CREAT SERPL-MCNC: 1.03 MG/DL — SIGNIFICANT CHANGE UP (ref 0.5–1.3)
GLUCOSE SERPL-MCNC: 100 MG/DL — HIGH (ref 70–99)
HCT VFR BLD CALC: 44.9 % — SIGNIFICANT CHANGE UP (ref 39–50)
HGB BLD-MCNC: 15.2 G/DL — SIGNIFICANT CHANGE UP (ref 13–17)
MAGNESIUM SERPL-MCNC: 1.5 MG/DL — LOW (ref 1.6–2.6)
MCHC RBC-ENTMCNC: 31.2 PG — SIGNIFICANT CHANGE UP (ref 27–34)
MCHC RBC-ENTMCNC: 33.9 % — SIGNIFICANT CHANGE UP (ref 32–36)
MCV RBC AUTO: 92.2 FL — SIGNIFICANT CHANGE UP (ref 80–100)
NRBC # FLD: 0 K/UL — SIGNIFICANT CHANGE UP (ref 0–0)
PLATELET # BLD AUTO: 213 K/UL — SIGNIFICANT CHANGE UP (ref 150–400)
PMV BLD: 11.8 FL — SIGNIFICANT CHANGE UP (ref 7–13)
POTASSIUM SERPL-MCNC: 3.5 MMOL/L — SIGNIFICANT CHANGE UP (ref 3.5–5.3)
POTASSIUM SERPL-SCNC: 3.5 MMOL/L — SIGNIFICANT CHANGE UP (ref 3.5–5.3)
RBC # BLD: 4.87 M/UL — SIGNIFICANT CHANGE UP (ref 4.2–5.8)
RBC # FLD: 13.6 % — SIGNIFICANT CHANGE UP (ref 10.3–14.5)
SODIUM SERPL-SCNC: 139 MMOL/L — SIGNIFICANT CHANGE UP (ref 135–145)
WBC # BLD: 9 K/UL — SIGNIFICANT CHANGE UP (ref 3.8–10.5)
WBC # FLD AUTO: 9 K/UL — SIGNIFICANT CHANGE UP (ref 3.8–10.5)

## 2019-05-01 PROCEDURE — 93010 ELECTROCARDIOGRAM REPORT: CPT

## 2019-05-01 PROCEDURE — 99233 SBSQ HOSP IP/OBS HIGH 50: CPT

## 2019-05-01 RX ORDER — CHLORPROMAZINE HCL 10 MG
50 TABLET ORAL EVERY 6 HOURS
Qty: 0 | Refills: 0 | Status: DISCONTINUED | OUTPATIENT
Start: 2019-05-01 | End: 2019-05-03

## 2019-05-01 RX ORDER — CHLORPROMAZINE HCL 10 MG
50 TABLET ORAL EVERY 12 HOURS
Qty: 0 | Refills: 0 | Status: DISCONTINUED | OUTPATIENT
Start: 2019-05-01 | End: 2019-05-02

## 2019-05-01 RX ADMIN — HEPARIN SODIUM 5000 UNIT(S): 5000 INJECTION INTRAVENOUS; SUBCUTANEOUS at 06:15

## 2019-05-01 RX ADMIN — HEPARIN SODIUM 5000 UNIT(S): 5000 INJECTION INTRAVENOUS; SUBCUTANEOUS at 14:05

## 2019-05-01 RX ADMIN — Medication 100 MILLIGRAM(S): at 11:27

## 2019-05-01 RX ADMIN — HEPARIN SODIUM 5000 UNIT(S): 5000 INJECTION INTRAVENOUS; SUBCUTANEOUS at 22:36

## 2019-05-01 RX ADMIN — Medication 2 MILLIGRAM(S): at 05:00

## 2019-05-01 RX ADMIN — Medication 50 MILLIGRAM(S): at 12:20

## 2019-05-01 RX ADMIN — Medication 25 MILLIGRAM(S): at 06:14

## 2019-05-01 RX ADMIN — Medication 1.5 MILLIGRAM(S): at 10:08

## 2019-05-01 RX ADMIN — Medication 1.5 MILLIGRAM(S): at 13:22

## 2019-05-01 RX ADMIN — Medication 2 MILLIGRAM(S): at 07:47

## 2019-05-01 RX ADMIN — Medication 1 TABLET(S): at 11:27

## 2019-05-01 RX ADMIN — Medication 1.5 MILLIGRAM(S): at 02:01

## 2019-05-01 RX ADMIN — Medication 50 MILLIGRAM(S): at 14:04

## 2019-05-01 RX ADMIN — Medication 1 MILLIGRAM(S): at 22:36

## 2019-05-01 RX ADMIN — Medication 1 MILLIGRAM(S): at 17:31

## 2019-05-01 RX ADMIN — Medication 1 MILLIGRAM(S): at 11:27

## 2019-05-01 RX ADMIN — Medication 1.5 MILLIGRAM(S): at 06:14

## 2019-05-01 NOTE — PROGRESS NOTE BEHAVIORAL HEALTH - NSBHCONSULTFOLLOWDETAILS_PSY_A_CORE FT
Patient requires inpatient admission. Patient requires inpatient admission.  NO capacity to leave AMA.

## 2019-05-01 NOTE — PROVIDER CONTACT NOTE (OTHER) - NAME OF MD/NP/PA/DO NOTIFIED:
Carole BARCENAS
SWAPNA Zaidi
Tele PA made aware and Nurse manager
tele roberto long
SWAPNA Zaidi
Carole Redman)

## 2019-05-01 NOTE — PROVIDER CONTACT NOTE (OTHER) - SITUATION
Patient's DBP increasing, last /110
Ciwa score of 10. pt increasingly agitated.
PA Hope contacted to renew restraint order
PA notified to renew restraint order
Pt scored 12 on CIWA assessment
SWAPNA Zaidi notified to renew restraint order
Unable to obtain IV access and cannot give standing Ativan as ordered IVP, PRN IM given
Pt CIWA score has increased to 10  Pt has been refusing BIPAP at night  Pt has been refusing use of pneumatic compressions

## 2019-05-01 NOTE — CHART NOTE - NSCHARTNOTEFT_GEN_A_CORE
Patient is agitated and ciwa score had jumped from 10 to 21.  Will monitor VS.  ICU consult called. h/o Schizoaffective d/o, Paranoia, Suicidal attempt with pills ingestion. Dyan BARCENAS

## 2019-05-01 NOTE — CONSULT NOTE ADULT - SUBJECTIVE AND OBJECTIVE BOX
CHIEF COMPLAINT: Agitation.    HPI: The patient is 53 y M HTN, DM2, asthma, COPD, chronic back pain, schizoaffective d/o bipolar type, ALEJANDRO on CPAP admitted for bradycardia and suicide attempt by ingestion by suspected overdose of of oxycodone and also had access to depakote, ziprasidone, meloxicam and gabapentin. During hospital course has been managed on CIWA protocol for ETOH withdrawal, has been on ativan taper, which was restarted yesterday per Psych recs. The patient has been on 1:1 with restraints. His brother visited him yesterday. The patient's hospital course has been complicated by intermittent episodes of agitation, today was agitated again thrashing in bed despite restraints. Currently, the patient is resting in bed, sedated and answers some questions after repeated questioning. Denies any CP, F NVD abd pain. Aware that he is in the hospital for a suicide attempt. Additionally, the patient has been started on thorazine 50 IM BID per psych recs.      PAST MEDICAL & SURGICAL HISTORY:  COPD with asthma  Obstructive sleep apnea: CPAP for 7-8 years  Schizoaffective disorder, bipolar type  Diabetes: Type 2; pt lost weight, no longer taking meds  Hypertension, unspecified type  Paranoia  Uncomplicated asthma, unspecified asthma severity  S/P appendectomy      FAMILY HISTORY:  FH: cirrhosis: maternal grandmother, uncle      SOCIAL HISTORY:    Drinks EtOH.  Unable to obtain rest of ROS from patient.    Allergies    No Known Allergies    Intolerances    Haldol (Unknown)      REVIEW OF SYSTEMS:  Constitutional: [ ] negative [ ] fevers [ ] chills [ ] weight loss [ ] weight gain  HEENT: [ ] negative [ ] dry eyes [ ] eye irritation [ ] postnasal drip [ ] nasal congestion  CV: [ ] negative  [ ] chest pain [ ] orthopnea [ ] palpitations [ ] murmur  Resp: [ ] negative [ ] cough [ ] shortness of breath [ ] dyspnea [ ] wheezing [ ] sputum [ ] hemoptysis  GI: [ ] negative [ ] nausea [ ] vomiting [ ] diarrhea [ ] constipation [ ] abd pain [ ] dysphagia   : [ ] negative [ ] dysuria [ ] nocturia [ ] hematuria [ ] increased urinary frequency  Musculoskeletal: [ ] negative [ ] back pain [ ] myalgias [ ] arthralgias [ ] fracture  Skin: [ ] negative [ ] rash [ ] itch  Neurological: [ ] negative [ ] headache [ ] dizziness [ ] syncope [ ] weakness [ ] numbness  Psychiatric: [ ] negative [ ] anxiety [ ] depression  Endocrine: [ ] negative [ ] diabetes [ ] thyroid problem  Hematologic/Lymphatic: [ ] negative [ ] anemia [ ] bleeding problem  Allergic/Immunologic: [ ] negative [ ] itchy eyes [ ] nasal discharge [ ] hives [ ] angioedema  [ ] All other systems negative  [x] Unable to assess ROS because _AMS_______    OBJECTIVE:  ICU Vital Signs Last 24 Hrs  T(C): 35.9 (01 May 2019 14:29), Max: 37.6 (01 May 2019 07:37)  T(F): 96.6 (01 May 2019 14:29), Max: 99.7 (01 May 2019 07:37)  HR: 105 (01 May 2019 14:29) (69 - 120)  BP: 122/77 (01 May 2019 14:29) (113/73 - 155/95)  BP(mean): --  ABP: --  ABP(mean): --  RR: 24 (01 May 2019 14:30) (16 - 24)  SpO2: 96% (01 May 2019 14:30) (96% - 100%)        04-30 @ 07:01  -  05-01 @ 07:00  --------------------------------------------------------  IN: 0 mL / OUT: 150 mL / NET: -150 mL      CAPILLARY BLOOD GLUCOSE      POCT Blood Glucose.: 99 mg/dL (29 Apr 2019 22:57)    PHYSICAL EXAM:    GENERAL: Altered, somnolent but arousable.  HEAD:  Normocephalic, atraumatic  EYES: EOMI, PERRLA  HEENT: Moist mucous membranes  NECK: Supple, No JVD  NERVOUS SYSTEM:  Altered but follows some commands.  CHEST/LUNG: CTAB no wrr  HEART: Regular rate and rhythm, no murmur   ABDOMEN: Soft, Nontender, Nondistended, Bowel sounds present  EXTREMITIES:   No clubbing, cyanosis, or edema  MUSCULOSKELETAL: No muscle tenderness, no joint tenderness  SKIN: warm and dry, no rash       LINES:     HOSPITAL MEDICATIONS:  heparin  Injectable 5000 Unit(s) SubCutaneous every 8 hours      lisinopril 5 milliGRAM(s) Oral daily        chlorproMAZINE    Injectable 50 milliGRAM(s) IntraMuscular every 6 hours PRN  chlorproMAZINE    Injectable 50 milliGRAM(s) IntraMuscular every 12 hours  LORazepam   Injectable 2 milliGRAM(s) IV Push every 2 hours PRN  LORazepam   Injectable 2 milliGRAM(s) IV Push every 1 hour PRN  LORazepam   Injectable   IV Push   LORazepam   Injectable 1 milliGRAM(s) IV Push every 4 hours          folic acid 1 milliGRAM(s) Oral daily  lactated ringers. 1000 milliLiter(s) IV Continuous <Continuous>  multivitamin 1 Tablet(s) Oral daily  thiamine 100 milliGRAM(s) Oral daily            LABS:                        15.2   9.00  )-----------( 213      ( 01 May 2019 07:46 )             44.9     Hgb Trend: 15.2<--, 15.7<--, 16.2<--, 16.1<--, 16.7<--  05-01    139  |  102  |  18  ----------------------------<  100<H>  3.5   |  22  |  1.03    Ca    10.9<H>      01 May 2019 07:46  Mg     1.5     05-01      Creatinine Trend: 1.03<--, 0.87<--, 0.94<--, 0.91<--, 0.95<--, 0.94<--      Toxicology Screen, Drugs of Abuse, Urine (04.22.19 @ 23:49)    Phencyclidine Level, Urine: NEGATIVE    Amphetamine, Urine: NEGATIVE    Barbiturates Screen, Urine: NEGATIVE    Benzodiazepine, Urine: NEGATIVE    Cannabinoids, Urine: POSITIVE    Cocaine Metabolite, Urine: NEGATIVE    Methadone, Urine: NEGATIVE    Opiate, Urine: NEGATIVE    Oxycodone, Urine: POSITIVE:   TEST                       CUT OFF VALUE  ----                       -------------  AMPHETAMINE CLASS           1000 ng/mL  BARBITURATES                 200 ng/mL  BENZODIAZEPINES              300 ng/mL  CANNABINOIDS                  50 ng/mL  COCAINE/METABOLITE           300 ng/mL  METHADONE                    300 ng/mL  OPIATES                      300 ng/mL  PHENCYCLIDINE                 25 ng/mL  OXYCODONE                    100 ng/mL    URINE DRUG SCREENS ARE PERFORMED USING THE CUT OFF VALUES  LISTED ABOVE. LEVELS BELOW THE CUT OFF VALUES ARE REPORTED  AS NEGATIVE. CROSS REACTIVITY WITH OTHER MEDICATIONS MAY  OCCUR. THESE RESULTS ARE UNCONFIRMED. CONFIRMATORY TEST WILL  BE PERFORMED UPON REQUEST (NOTE: THE LABORATORY RETAINS  URINE SPECIMENS FOR 5 DAYS AFTER RECEIPT). THESE RESULTS ARE  FOR MEDICAL PURPOSES ONLY AND SHOULD NOT BE USED FOR  EMPLOYEE SCREENING OR LEGAL PURPOSES. A COMPREHENSIVE  REFERENCE OF CROSS-REACTING DRUGS IS AVAILABLE IN THE  LABORATORY.          MICROBIOLOGY:     RADIOLOGY:    < from: CT Head No Cont (04.23.19 @ 16:04) >  Impression:  Unremarkable noncontrast head CT.    < end of copied text >    < from: Xray Chest 2 Views PA/Lat (04.23.19 @ 17:56) >    IMPRESSION:    1.  The lungs are clear.    < end of copied text >      [x] Reviewed and interpreted by me    EKG:

## 2019-05-01 NOTE — PROGRESS NOTE ADULT - SUBJECTIVE AND OBJECTIVE BOX
Patient is a 53y old  Male who presents with a chief complaint of c/o overdose (30 Apr 2019 13:37)    SUBJECTIVE / OVERNIGHT EVENTS:  Code gray called yesterday, patient trying to leave and hit staff, CIWA score noted to be 11, given 3.5 mg of ativan and 25 mg of Thorazine IM x 1. Placed on 4 pt leather restraints with 1 to 1 sitter. Later that night became increasingly agitated, began screaming and trying to get out of bed. CIWA score of 8. Given 1.5mg Ativan. Called ED psych, who gave OK to give his 25mg IM throrazine early.  Patient seen this morning, found to be sleeping.     MEDICATIONS  (STANDING):  chlorproMAZINE    Injectable 50 milliGRAM(s) IntraMuscular every 12 hours  folic acid 1 milliGRAM(s) Oral daily  heparin  Injectable 5000 Unit(s) SubCutaneous every 8 hours  lactated ringers. 1000 milliLiter(s) (100 mL/Hr) IV Continuous <Continuous>  lisinopril 5 milliGRAM(s) Oral daily  LORazepam   Injectable   IV Push   LORazepam   Injectable 1 milliGRAM(s) IV Push every 4 hours  multivitamin 1 Tablet(s) Oral daily  thiamine 100 milliGRAM(s) Oral daily    MEDICATIONS  (PRN):  chlorproMAZINE    Injectable 50 milliGRAM(s) IntraMuscular every 6 hours PRN agitation  LORazepam   Injectable 2 milliGRAM(s) IV Push every 2 hours PRN CIWA-Ar score increase by 2 points and a total score of 7 or less  LORazepam   Injectable 2 milliGRAM(s) IV Push every 1 hour PRN CIWA-Ar score 8 or greater      Vital Signs Last 24 Hrs  T(C): 35.9 (01 May 2019 14:29), Max: 37.6 (01 May 2019 07:37)  T(F): 96.6 (01 May 2019 14:29), Max: 99.7 (01 May 2019 07:37)  HR: 105 (01 May 2019 14:29) (69 - 120)  BP: 122/77 (01 May 2019 14:29) (113/73 - 155/95)  BP(mean): --  RR: 24 (01 May 2019 14:30) (16 - 24)  SpO2: 96% (01 May 2019 14:30) (96% - 100%)  CAPILLARY BLOOD GLUCOSE        I&O's Summary    30 Apr 2019 07:01  -  01 May 2019 07:00  --------------------------------------------------------  IN: 0 mL / OUT: 150 mL / NET: -150 mL          PHYSICAL EXAM  GENERAL: NAD, well-developed  HEAD:  Atraumatic, Normocephalic  EYES: EOMI, PERRLA, conjunctiva and sclera clear  NECK: Supple, No JVD  CHEST/LUNG: Clear to auscultation bilaterally; No wheeze  HEART: Regular rate and rhythm; No murmurs, rubs, or gallops  ABDOMEN: Soft, Nontender, Nondistended; Bowel sounds present  EXTREMITIES:  2+ Peripheral Pulses, No clubbing, cyanosis, or edema  PSYCH: AAOx3  SKIN: No rashes or lesions    LABS:                        15.2   9.00  )-----------( 213      ( 01 May 2019 07:46 )             44.9     05-01    139  |  102  |  18  ----------------------------<  100<H>  3.5   |  22  |  1.03    Ca    10.9<H>      01 May 2019 07:46  Mg     1.5     05-01                  RADIOLOGY & ADDITIONAL TESTS:    Imaging Personally Reviewed:  Consultant(s) Notes Reviewed:    Care Discussed with Consultants/Other Providers:

## 2019-05-01 NOTE — CONSULT NOTE ADULT - ASSESSMENT
The patient is 53 y M HTN, DM2, asthma, COPD, chronic back pain, schizoaffective d/o bipolar type, ALEJANDRO on CPAP admitted for bradycardia and suicide attempt by ingestion by suspected multimedication overdose who has had intermittent agitation to the point of requiring restraints.    - Agree with restarting ativan taper  - Agree with Thorazine  - Afebrile, CXR clear, no evidence of leukocytosis so unlikely to be infectious, but rec. UA.  - C/w Psych recs  - Not a MICU candidate at this time.    Farzad Catalan MD  PGY3 Internal Medicine Resident  Pager: [phone] - [place]  [phone] - [facility] The patient is 53 y M HTN, DM2, asthma, COPD, chronic back pain, schizoaffective d/o bipolar type, ALEJANDRO on CPAP admitted for bradycardia and suicide attempt by ingestion by suspected multimedication overdose who has had intermittent agitation to the point of requiring restraints.    - Agree with restarting ativan taper  - Agree with Thorazine  - Afebrile, CXR clear, no evidence of leukocytosis so unlikely to be infectious, but rec. UA.  - C/w Psych recs - no medical cause of his AMS identified, likely secondary to underlying psychiatric diagnosis.  - Not a MICU candidate at this time.    Farzad Catalan MD  PGY3 Internal Medicine Resident  Pager: [phone] - [place]  [phone] - [facility]

## 2019-05-01 NOTE — PROGRESS NOTE BEHAVIORAL HEALTH - NSBHFUPINTERVALHXFT_PSY_A_CORE
Patient with elopement attempt and significant agitation overnight requiring the use of four-point restraints. This morning, patient is sedated and unable to be aroused for interview. Patient approached later in the day, and he wakes to voice and touch but refuses to engage in interview beyond stating his location and the reason he is currently in the hospital.

## 2019-05-01 NOTE — PROGRESS NOTE ADULT - PROBLEM SELECTOR PLAN 6
Psych following  - Thorazine 50mg PO/IM BID  - PRN Thorazine 50mg IM/PO Q6h for severe agitation/combative behavior  - does not have capacity to leave AMA  - Patient will require inpatient psych admission after medically optimized

## 2019-05-01 NOTE — CHART NOTE - NSCHARTNOTEFT_GEN_A_CORE
Case discussed with Psychiatrist team and covering nurse, will Dc ziprasidone and start Thorazine 50mg BID for standing dose.  Start Thorazine 50mg IM q6 for PRN doses.  Patient does not have capacity to leave hospital.. He is not allowed to leave hospital Against Medical Advice.  Currently is on 4 point restraints needs to be renewed Q4 hour.  Dyan BARCENAS

## 2019-05-01 NOTE — PROVIDER CONTACT NOTE (OTHER) - ACTION/TREATMENT ORDERED:
Administering standing dose 50mg Thorazine  Will document pt refusing BIPAP & penumatic compressions
No interventions ordered at this time. Will continue to monitor.
Awaiting order
Administered IM Chloropromazine as ordered
Ativan taper previously ordered.
Continue to attempt IV access

## 2019-05-01 NOTE — CHART NOTE - NSCHARTNOTEFT_GEN_A_CORE
Spoke to Psy resident regarding patient's CIWA status.  Patient has been admitted since 4/23/19.  She agrees with above and will evaluate the patient in the morning.  There are more psychiatric components Schizoaffective disorder, Paranoia, Suicidal attempt and pill ingestion.  Dyan BARCENAS

## 2019-05-01 NOTE — PROVIDER CONTACT NOTE (OTHER) - REASON
CIWA score > 7 or 8
Ciwa score 10
Renew restraint order
Unable to obtain IV access d/t agitation
renew restraint order
renew restraint order
CIWA >8, d/c bipap, d/c pneumatic compressions
Patient's DBP increasing, last /110
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
## 2019-05-01 NOTE — PROVIDER CONTACT NOTE (OTHER) - ASSESSMENT
Pt is severely agitated and anxious. Pt is uncooperative and danger to self and others
Patient's DBP increasing, last /110. Patient is restless, unable to stay still when measuring vital signs.
Pt anxious and agitated but able to answer cognitive questions appropriately
pt AOx2 to name and place. VSS denies cp or SOB.
pt severely agitated when attempting to place IV access, assistance w several RNs unable to obtain access at this time

## 2019-05-01 NOTE — PROVIDER CONTACT NOTE (OTHER) - DATE AND TIME:
25-Apr-2019 02:21
01-May-2019 08:40
01-May-2019 12:30
01-May-2019 16:24
24-Apr-2019 03:35
28-Apr-2019 12:36
29-Apr-2019 01:40
01-May-2019 13:58

## 2019-05-02 LAB
ANION GAP SERPL CALC-SCNC: 14 MMO/L — SIGNIFICANT CHANGE UP (ref 7–14)
BUN SERPL-MCNC: 24 MG/DL — HIGH (ref 7–23)
CALCIUM SERPL-MCNC: 10.8 MG/DL — HIGH (ref 8.4–10.5)
CHLORIDE SERPL-SCNC: 100 MMOL/L — SIGNIFICANT CHANGE UP (ref 98–107)
CO2 SERPL-SCNC: 20 MMOL/L — LOW (ref 22–31)
CREAT SERPL-MCNC: 1.01 MG/DL — SIGNIFICANT CHANGE UP (ref 0.5–1.3)
GLUCOSE SERPL-MCNC: 106 MG/DL — HIGH (ref 70–99)
HCT VFR BLD CALC: 44.8 % — SIGNIFICANT CHANGE UP (ref 39–50)
HGB BLD-MCNC: 15.2 G/DL — SIGNIFICANT CHANGE UP (ref 13–17)
MAGNESIUM SERPL-MCNC: 1.7 MG/DL — SIGNIFICANT CHANGE UP (ref 1.6–2.6)
MCHC RBC-ENTMCNC: 31.1 PG — SIGNIFICANT CHANGE UP (ref 27–34)
MCHC RBC-ENTMCNC: 33.9 % — SIGNIFICANT CHANGE UP (ref 32–36)
MCV RBC AUTO: 91.8 FL — SIGNIFICANT CHANGE UP (ref 80–100)
NRBC # FLD: 0 K/UL — SIGNIFICANT CHANGE UP (ref 0–0)
PHOSPHATE SERPL-MCNC: 3.1 MG/DL — SIGNIFICANT CHANGE UP (ref 2.5–4.5)
PLATELET # BLD AUTO: 216 K/UL — SIGNIFICANT CHANGE UP (ref 150–400)
PMV BLD: 12.2 FL — SIGNIFICANT CHANGE UP (ref 7–13)
POTASSIUM SERPL-MCNC: 3.9 MMOL/L — SIGNIFICANT CHANGE UP (ref 3.5–5.3)
POTASSIUM SERPL-SCNC: 3.9 MMOL/L — SIGNIFICANT CHANGE UP (ref 3.5–5.3)
RBC # BLD: 4.88 M/UL — SIGNIFICANT CHANGE UP (ref 4.2–5.8)
RBC # FLD: 13.3 % — SIGNIFICANT CHANGE UP (ref 10.3–14.5)
SODIUM SERPL-SCNC: 134 MMOL/L — LOW (ref 135–145)
WBC # BLD: 11.18 K/UL — HIGH (ref 3.8–10.5)
WBC # FLD AUTO: 11.18 K/UL — HIGH (ref 3.8–10.5)

## 2019-05-02 PROCEDURE — 99233 SBSQ HOSP IP/OBS HIGH 50: CPT

## 2019-05-02 RX ORDER — CHLORPROMAZINE HCL 10 MG
50 TABLET ORAL EVERY 8 HOURS
Qty: 0 | Refills: 0 | Status: DISCONTINUED | OUTPATIENT
Start: 2019-05-02 | End: 2019-05-03

## 2019-05-02 RX ADMIN — Medication 1 MILLIGRAM(S): at 03:07

## 2019-05-02 RX ADMIN — Medication 1 MILLIGRAM(S): at 18:00

## 2019-05-02 RX ADMIN — Medication 2 MILLIGRAM(S): at 05:12

## 2019-05-02 RX ADMIN — LISINOPRIL 5 MILLIGRAM(S): 2.5 TABLET ORAL at 05:12

## 2019-05-02 RX ADMIN — Medication 1 MILLIGRAM(S): at 15:23

## 2019-05-02 RX ADMIN — Medication 2 MILLIGRAM(S): at 06:16

## 2019-05-02 RX ADMIN — Medication 1 MILLIGRAM(S): at 22:41

## 2019-05-02 RX ADMIN — Medication 50 MILLIGRAM(S): at 11:41

## 2019-05-02 RX ADMIN — HEPARIN SODIUM 5000 UNIT(S): 5000 INJECTION INTRAVENOUS; SUBCUTANEOUS at 05:12

## 2019-05-02 RX ADMIN — HEPARIN SODIUM 5000 UNIT(S): 5000 INJECTION INTRAVENOUS; SUBCUTANEOUS at 13:59

## 2019-05-02 RX ADMIN — Medication 50 MILLIGRAM(S): at 19:38

## 2019-05-02 RX ADMIN — Medication 50 MILLIGRAM(S): at 04:22

## 2019-05-02 RX ADMIN — HEPARIN SODIUM 5000 UNIT(S): 5000 INJECTION INTRAVENOUS; SUBCUTANEOUS at 22:41

## 2019-05-02 RX ADMIN — Medication 1 MILLIGRAM(S): at 12:30

## 2019-05-02 NOTE — PROGRESS NOTE ADULT - PROBLEM SELECTOR PLAN 6
Psych following  - Thorazine 50mg PO/IM BID-> TID as per psych  - PRN Thorazine 50mg IM/PO Q6h for severe agitation/combative behavior  - does not have capacity to leave AMA  - Patient will require inpatient psych admission after medically optimized

## 2019-05-02 NOTE — PROGRESS NOTE ADULT - SUBJECTIVE AND OBJECTIVE BOX
Patient is a 53y old  Male who presents with a chief complaint of c/o overdose (01 May 2019 15:05)    SUBJECTIVE / OVERNIGHT EVENTS:  Overnight, patient was agitated and ciwa score had jumped from 10 to 21, thrashing in bed despite restraints, evaluated and rejected by MICU.   This morning, patient somnolent, denying any complaints.     MEDICATIONS  (STANDING):  chlorproMAZINE    Injectable 50 milliGRAM(s) IntraMuscular every 8 hours  folic acid 1 milliGRAM(s) Oral daily  heparin  Injectable 5000 Unit(s) SubCutaneous every 8 hours  lactated ringers. 1000 milliLiter(s) (100 mL/Hr) IV Continuous <Continuous>  lisinopril 5 milliGRAM(s) Oral daily  LORazepam   Injectable 1 milliGRAM(s) IntraMuscular every 4 hours  multivitamin 1 Tablet(s) Oral daily  thiamine 100 milliGRAM(s) Oral daily    MEDICATIONS  (PRN):  chlorproMAZINE    Injectable 50 milliGRAM(s) IntraMuscular every 6 hours PRN agitation  LORazepam   Injectable 2 milliGRAM(s) IntraMuscular every 2 hours PRN CIWA-Ar score increase by 2 points and a total score of 7 or less  LORazepam   Injectable 2 milliGRAM(s) IntraMuscular every 1 hour PRN CIWA-Ar score 8 or greater      Vital Signs Last 24 Hrs  T(C): 36.9 (02 May 2019 13:58), Max: 37 (01 May 2019 15:30)  T(F): 98.4 (02 May 2019 13:58), Max: 98.6 (01 May 2019 15:30)  HR: 115 (02 May 2019 13:58) (68 - 115)  BP: 121/69 (02 May 2019 13:58) (105/58 - 145/101)  BP(mean): --  RR: 18 (02 May 2019 13:58) (16 - 20)  SpO2: 97% (02 May 2019 13:58) (97% - 99%)  CAPILLARY BLOOD GLUCOSE        I&O's Summary        PHYSICAL EXAM  GENERAL: NAD, well-developed  HEAD:  Atraumatic, Normocephalic  EYES: EOMI, PERRLA, conjunctiva and sclera clear  NECK: Supple, No JVD  CHEST/LUNG: Clear to auscultation bilaterally; No wheeze  HEART: Regular rate and rhythm; No murmurs, rubs, or gallops  ABDOMEN: Soft, Nontender, Nondistended; Bowel sounds present  EXTREMITIES:  2+ Peripheral Pulses, No clubbing, cyanosis, or edema  PSYCH: AAOx2 (person, place)  SKIN: No rashes or lesions    LABS:                        15.2   11.18 )-----------( 216      ( 02 May 2019 06:30 )             44.8     05-02    134<L>  |  100  |  24<H>  ----------------------------<  106<H>  3.9   |  20<L>  |  1.01    Ca    10.8<H>      02 May 2019 06:30  Phos  3.1     05-02  Mg     1.7     05-02                  RADIOLOGY & ADDITIONAL TESTS:    Imaging Personally Reviewed:  Consultant(s) Notes Reviewed:    Care Discussed with Consultants/Other Providers: Discussed case with Dr. Mathis, Psych

## 2019-05-02 NOTE — PROGRESS NOTE BEHAVIORAL HEALTH - NSBHFUPINTERVALHXFT_PSY_A_CORE
Subjective   Angie Amanda Miramontes is a 23 y.o. female.     HPI Comments: Patient presents today to discuss AUB with continuous bleeding for the past 3 1/2 mo.  Started trying to get pregnant in May,   Reports infrequent periods q 4-6 mo from age [age] with diagnosis of PCOS  2 rounds of clomid  G0  Partner has no children  Dr Zuniga had checked thyroid, testosterone, iron.  No h/o STI  Reports use of ovulation kits at home, had + results in Aug and Nov.  Partner has not had Semen analysis  Patient does report poor nail quality and hair growth along with lack of sleep for the past 6 wk.  No significant weight fluctuation.  Discussed potential options for further evaluation and management with R/B/A  Patient would like to repeat CBC, TSH and check TVUS, we also discussed semen analysis for her partner which she would like to have checked.   Sebastian Miramontes 1/5/89 is partner    Menstrual Problem     Pelvic Pain   The patient's primary symptoms include pelvic pain.       The following portions of the patient's history were reviewed and updated as appropriate: allergies, current medications, past family history, past medical history, past social history, past surgical history and problem list.      Review of Systems   Genitourinary: Positive for dyspareunia, menstrual problem and pelvic pain.   All other systems reviewed and are negative.      Objective   Physical Exam   Constitutional: She is oriented to person, place, and time. She appears well-developed and well-nourished. No distress.   HENT:   Head: Normocephalic and atraumatic.   Right Ear: External ear normal.   Left Ear: External ear normal.   Nose: Nose normal.   Mouth/Throat: Oropharynx is clear and moist.   Neurological: She is alert and oriented to person, place, and time.   Skin: She is not diaphoretic.   Psychiatric: She has a normal mood and affect. Her behavior is normal. Judgment and thought content normal.   Vitals reviewed.      Assessment/Plan   Angie was  seen today for menstrual problem, pelvic pain and personal problem.    Diagnoses and all orders for this visit:    Abnormal uterine bleeding (AUB)  -     US Non-ob Transvaginal; Future  -     CBC (No Diff); Future  -     TSH; Future    Infertility associated with anovulation  -     CBC (No Diff); Future  -     TSH; Future       Check CBC, TSH, TVUS and f/u 1-2 wk  Semen Analysis for  Sebastian          Patient with significant agitation requiring the use of restraints and received multiple PRNs Thorazine X2, Ativan x1. CIWA 8-21 (mostly on anxiety. agitation) , CIWA now 2-5 (on anxiety and agitation).  This morning, patient is somewhat drowsy but aroused for interview. He does not recall the periods of agitation.  Patient AAOX2-3 (hospital, 2019), stated that before he came to the hospital he was drinking Vodka and marijuana, again stated that he OD on his medications to kill himself. Patient denies AH and VH, however he appeared internally preoccupied and very difficult to engage in interview.
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
## 2019-05-03 ENCOUNTER — TRANSCRIPTION ENCOUNTER (OUTPATIENT)
Age: 54
End: 2019-05-03

## 2019-05-03 ENCOUNTER — INPATIENT (INPATIENT)
Facility: HOSPITAL | Age: 54
LOS: 10 days | Discharge: HOME | End: 2019-05-14
Attending: PSYCHIATRY & NEUROLOGY | Admitting: PSYCHIATRY & NEUROLOGY
Payer: COMMERCIAL

## 2019-05-03 VITALS
DIASTOLIC BLOOD PRESSURE: 78 MMHG | TEMPERATURE: 98 F | HEART RATE: 100 BPM | OXYGEN SATURATION: 99 % | SYSTOLIC BLOOD PRESSURE: 122 MMHG | RESPIRATION RATE: 18 BRPM

## 2019-05-03 VITALS — TEMPERATURE: 98 F | WEIGHT: 225.09 LBS | HEIGHT: 74 IN

## 2019-05-03 DIAGNOSIS — Z90.49 ACQUIRED ABSENCE OF OTHER SPECIFIED PARTS OF DIGESTIVE TRACT: Chronic | ICD-10-CM

## 2019-05-03 DIAGNOSIS — F25.0 SCHIZOAFFECTIVE DISORDER, BIPOLAR TYPE: ICD-10-CM

## 2019-05-03 PROBLEM — G47.33 OBSTRUCTIVE SLEEP APNEA (ADULT) (PEDIATRIC): Chronic | Status: ACTIVE | Noted: 2019-04-23

## 2019-05-03 LAB
ANION GAP SERPL CALC-SCNC: 13 MMO/L — SIGNIFICANT CHANGE UP (ref 7–14)
BUN SERPL-MCNC: 15 MG/DL — SIGNIFICANT CHANGE UP (ref 7–23)
CALCIUM SERPL-MCNC: 10.5 MG/DL — SIGNIFICANT CHANGE UP (ref 8.4–10.5)
CHLORIDE SERPL-SCNC: 97 MMOL/L — LOW (ref 98–107)
CK SERPL-CCNC: 705 U/L — HIGH (ref 30–200)
CO2 SERPL-SCNC: 23 MMOL/L — SIGNIFICANT CHANGE UP (ref 22–31)
CREAT SERPL-MCNC: 0.9 MG/DL — SIGNIFICANT CHANGE UP (ref 0.5–1.3)
GLUCOSE SERPL-MCNC: 109 MG/DL — HIGH (ref 70–99)
HCT VFR BLD CALC: 45.4 % — SIGNIFICANT CHANGE UP (ref 39–50)
HGB BLD-MCNC: 15.5 G/DL — SIGNIFICANT CHANGE UP (ref 13–17)
MAGNESIUM SERPL-MCNC: 1.9 MG/DL — SIGNIFICANT CHANGE UP (ref 1.6–2.6)
MCHC RBC-ENTMCNC: 31.5 PG — SIGNIFICANT CHANGE UP (ref 27–34)
MCHC RBC-ENTMCNC: 34.1 % — SIGNIFICANT CHANGE UP (ref 32–36)
MCV RBC AUTO: 92.3 FL — SIGNIFICANT CHANGE UP (ref 80–100)
NRBC # FLD: 0 K/UL — SIGNIFICANT CHANGE UP (ref 0–0)
PLATELET # BLD AUTO: 210 K/UL — SIGNIFICANT CHANGE UP (ref 150–400)
PMV BLD: 11.7 FL — SIGNIFICANT CHANGE UP (ref 7–13)
POTASSIUM SERPL-MCNC: 4.3 MMOL/L — SIGNIFICANT CHANGE UP (ref 3.5–5.3)
POTASSIUM SERPL-SCNC: 4.3 MMOL/L — SIGNIFICANT CHANGE UP (ref 3.5–5.3)
RBC # BLD: 4.92 M/UL — SIGNIFICANT CHANGE UP (ref 4.2–5.8)
RBC # FLD: 13.4 % — SIGNIFICANT CHANGE UP (ref 10.3–14.5)
SODIUM SERPL-SCNC: 133 MMOL/L — LOW (ref 135–145)
WBC # BLD: 10.77 K/UL — HIGH (ref 3.8–10.5)
WBC # FLD AUTO: 10.77 K/UL — HIGH (ref 3.8–10.5)

## 2019-05-03 PROCEDURE — 99233 SBSQ HOSP IP/OBS HIGH 50: CPT

## 2019-05-03 PROCEDURE — 99239 HOSP IP/OBS DSCHRG MGMT >30: CPT

## 2019-05-03 RX ORDER — CHLORPROMAZINE HCL 10 MG
50 TABLET ORAL THREE TIMES A DAY
Qty: 0 | Refills: 0 | Status: DISCONTINUED | OUTPATIENT
Start: 2019-05-03 | End: 2019-05-03

## 2019-05-03 RX ORDER — BENZTROPINE MESYLATE 1 MG
1 TABLET ORAL
Qty: 0 | Refills: 0 | COMMUNITY

## 2019-05-03 RX ORDER — CHLORPROMAZINE HCL 10 MG
50 TABLET ORAL EVERY 6 HOURS
Qty: 0 | Refills: 0 | Status: DISCONTINUED | OUTPATIENT
Start: 2019-05-03 | End: 2019-05-05

## 2019-05-03 RX ORDER — CHLORPROMAZINE HCL 10 MG
50 TABLET ORAL THREE TIMES A DAY
Qty: 0 | Refills: 0 | Status: DISCONTINUED | OUTPATIENT
Start: 2019-05-03 | End: 2019-05-05

## 2019-05-03 RX ORDER — FOLIC ACID 0.8 MG
1 TABLET ORAL
Qty: 0 | Refills: 0 | DISCHARGE
Start: 2019-05-03

## 2019-05-03 RX ORDER — FOLIC ACID 0.8 MG
1 TABLET ORAL DAILY
Qty: 0 | Refills: 0 | Status: DISCONTINUED | OUTPATIENT
Start: 2019-05-03 | End: 2019-05-14

## 2019-05-03 RX ORDER — DIPHENHYDRAMINE HCL 50 MG
50 CAPSULE ORAL EVERY 6 HOURS
Qty: 0 | Refills: 0 | Status: DISCONTINUED | OUTPATIENT
Start: 2019-05-03 | End: 2019-05-14

## 2019-05-03 RX ORDER — THIAMINE MONONITRATE (VIT B1) 100 MG
1 TABLET ORAL
Qty: 0 | Refills: 0 | DISCHARGE
Start: 2019-05-03

## 2019-05-03 RX ORDER — CHLORPROMAZINE HCL 10 MG
1 TABLET ORAL
Qty: 0 | Refills: 0 | DISCHARGE
Start: 2019-05-03

## 2019-05-03 RX ORDER — LISINOPRIL 2.5 MG/1
5 TABLET ORAL DAILY
Qty: 0 | Refills: 0 | Status: DISCONTINUED | OUTPATIENT
Start: 2019-05-03 | End: 2019-05-14

## 2019-05-03 RX ORDER — MELOXICAM 15 MG/1
1 TABLET ORAL
Qty: 0 | Refills: 0 | COMMUNITY

## 2019-05-03 RX ORDER — DICLOFENAC SODIUM 30 MG/G
0 GEL TOPICAL
Qty: 0 | Refills: 0 | COMMUNITY

## 2019-05-03 RX ORDER — DIVALPROEX SODIUM 500 MG/1
2 TABLET, DELAYED RELEASE ORAL
Qty: 0 | Refills: 0 | COMMUNITY

## 2019-05-03 RX ORDER — THIAMINE MONONITRATE (VIT B1) 100 MG
100 TABLET ORAL DAILY
Qty: 0 | Refills: 0 | Status: DISCONTINUED | OUTPATIENT
Start: 2019-05-03 | End: 2019-05-14

## 2019-05-03 RX ORDER — DIPHENHYDRAMINE HCL 50 MG
50 CAPSULE ORAL ONCE
Qty: 0 | Refills: 0 | Status: DISCONTINUED | OUTPATIENT
Start: 2019-05-03 | End: 2019-05-14

## 2019-05-03 RX ORDER — GABAPENTIN 400 MG/1
1 CAPSULE ORAL
Qty: 0 | Refills: 0 | COMMUNITY

## 2019-05-03 RX ORDER — CHLORPROMAZINE HCL 10 MG
50 TABLET ORAL ONCE
Qty: 0 | Refills: 0 | Status: DISCONTINUED | OUTPATIENT
Start: 2019-05-03 | End: 2019-05-05

## 2019-05-03 RX ORDER — FLUOXETINE HCL 10 MG
3 CAPSULE ORAL
Qty: 0 | Refills: 0 | COMMUNITY

## 2019-05-03 RX ORDER — ZIPRASIDONE HYDROCHLORIDE 20 MG/1
1 CAPSULE ORAL
Qty: 0 | Refills: 0 | COMMUNITY

## 2019-05-03 RX ORDER — HYDROXYZINE HCL 10 MG
1 TABLET ORAL
Qty: 0 | Refills: 0 | COMMUNITY

## 2019-05-03 RX ADMIN — Medication 100 MILLIGRAM(S): at 11:01

## 2019-05-03 RX ADMIN — Medication 1 MILLIGRAM(S): at 11:01

## 2019-05-03 RX ADMIN — LISINOPRIL 5 MILLIGRAM(S): 2.5 TABLET ORAL at 06:02

## 2019-05-03 RX ADMIN — HEPARIN SODIUM 5000 UNIT(S): 5000 INJECTION INTRAVENOUS; SUBCUTANEOUS at 06:02

## 2019-05-03 RX ADMIN — Medication 1 MILLIGRAM(S): at 05:06

## 2019-05-03 RX ADMIN — Medication 50 MILLIGRAM(S): at 21:40

## 2019-05-03 RX ADMIN — HEPARIN SODIUM 5000 UNIT(S): 5000 INJECTION INTRAVENOUS; SUBCUTANEOUS at 13:32

## 2019-05-03 RX ADMIN — Medication 50 MILLIGRAM(S): at 13:31

## 2019-05-03 RX ADMIN — Medication 1 MILLIGRAM(S): at 16:32

## 2019-05-03 RX ADMIN — Medication 50 MILLIGRAM(S): at 03:40

## 2019-05-03 RX ADMIN — Medication 1 TABLET(S): at 11:01

## 2019-05-03 NOTE — PROGRESS NOTE BEHAVIORAL HEALTH - NSBHCHARTREVIEWLAB_PSY_A_CORE FT
16.7   7.08  )-----------( 225      ( 26 Apr 2019 06:53 )             50.1   04-26    139  |  104  |  18  ----------------------------<  82  4.0   |  22  |  0.95    Ca    10.5      26 Apr 2019 06:53  Mg     1.9     04-25
15.7   6.47  )-----------( 198      ( 30 Apr 2019 06:49 )             46.7   04-30    140  |  104  |  14  ----------------------------<  99  4.0   |  23  |  0.87  04-29    139  |  103  |  18  ----------------------------<  91  4.0   |  23  |  0.94    Ca   10.5   30 Apr 2019 06:49 /  , Ca   10.8<H>   29 Apr 2019 06:30 /      Mg   1.8   04-30 /, Mg   1.9   04-29 /
15.2   11.18 )-----------( 216      ( 02 May 2019 06:30 )             44.8
15.5   10.77 )-----------( 210      ( 03 May 2019 05:15 )             45.4   05-03    133<L>  |  97<L>  |  15  ----------------------------<  109<H>  4.3   |  23  |  0.90  05-02    134<L>  |  100  |  24<H>  ----------------------------<  106<H>  3.9   |  20<L>  |  1.01    Ca   10.5   03 May 2019 05:15 /  , Ca   10.8<H>   02 May 2019 06:30 /    Phos  3.1  05-02 /  Mg   1.9   05-03 /, Mg   1.7   05-02 /
15.7   7.01  )-----------( 188      ( 24 Apr 2019 08:05 )             46.6
16.7   7.08  )-----------( 225      ( 26 Apr 2019 06:53 )             50.1
15.2   9.00  )-----------( 213      ( 01 May 2019 07:46 )             44.9   05-01    139  |  102  |  18  ----------------------------<  100<H>  3.5   |  22  |  1.03  04-30    140  |  104  |  14  ----------------------------<  99  4.0   |  23  |  0.87    Ca   10.9<H>   01 May 2019 07:46 /  , Ca   10.5   30 Apr 2019 06:49 /      Mg   1.5   05-01 /, Mg   1.8   04-30 /
16.2   6.88  )-----------( 214      ( 29 Apr 2019 06:30 )             48.5   04-29    139  |  103  |  18  ----------------------------<  91  4.0   |  23  |  0.94  04-28    138  |  103  |  18  ----------------------------<  101<H>  4.0   |  23  |  0.91    Ca   10.8<H>   29 Apr 2019 06:30 /  , Ca   10.6<H>   28 Apr 2019 06:48 /      Mg   1.9   04-29 /, Mg   1.9   04-28 /
LABS:                        16.1   7.06  )-----------( 201      ( 28 Apr 2019 06:48 )             47.9     28 Apr 2019 06:48    138    |  103    |  18     ----------------------------<  101    4.0     |  23     |  0.91     Ca    10.6       28 Apr 2019 06:48  Mg     1.9       28 Apr 2019 06:48
15.7   7.01  )-----------( 188      ( 24 Apr 2019 08:05 )             46.6

## 2019-05-03 NOTE — PROGRESS NOTE ADULT - PROBLEM SELECTOR PROBLEM 8
Chronic obstructive pulmonary disease, unspecified COPD type

## 2019-05-03 NOTE — PROGRESS NOTE BEHAVIORAL HEALTH - NSBHPTASSESSDT_PSY_A_CORE
02-May-2019 15:07
03-May-2019 14:23
25-Apr-2019 10:51
27-Apr-2019 10:13
28-Apr-2019 13:17
29-Apr-2019 12:56
24-Apr-2019 11:28
01-May-2019 12:54
30-Apr-2019 10:35
25-Apr-2019 10:51

## 2019-05-03 NOTE — PROGRESS NOTE ADULT - PROBLEM SELECTOR PROBLEM 7
Obstructive sleep apnea

## 2019-05-03 NOTE — PROGRESS NOTE BEHAVIORAL HEALTH - NSBHFUPSUICINTERVAL_PSY_A_CORE
none known
unable to assess
none known
unable to assess
none known
unable to assess

## 2019-05-03 NOTE — PROGRESS NOTE BEHAVIORAL HEALTH - AFFECT CONGRUENCE
Not congruent
Other
Congruent
Not congruent
Not congruent
Other
Not congruent
Other

## 2019-05-03 NOTE — DISCHARGE NOTE NURSING/CASE MANAGEMENT/SOCIAL WORK - NSDCPEWEB_GEN_ALL_CORE
[facility] for [facility] website --- [email or web address] --- [email or web address]

## 2019-05-03 NOTE — PROGRESS NOTE BEHAVIORAL HEALTH - NSBHCONSORIP_PSY_A_CORE
Consult...

## 2019-05-03 NOTE — PROGRESS NOTE BEHAVIORAL HEALTH - NSBHFUPINTERVALHXFT_PSY_A_CORE
Patient seen and examined. Significantly decreased use of PRNs overnight (received IM Thorazine 50mg x 1). CIWAs 0-4. HR 80s - 90s. BPs 110s - 140s / 70s - 80s. Patient is notably calmer and more linear than previous interviews. A&Ox3/3. Asked about hospitalization, states he "jumped out of a window" to kill himself. States he had been feeling "down." Denies imminent SIIP. Denies HIIP. Denies AH/VH and paranoid ideation.

## 2019-05-03 NOTE — PROGRESS NOTE ADULT - PROBLEM SELECTOR PLAN 7
Continue CPAP qhs

## 2019-05-03 NOTE — PROGRESS NOTE BEHAVIORAL HEALTH - NSBHFUPTYPE_PSY_A_CORE
Consult Follow Up

## 2019-05-03 NOTE — PROGRESS NOTE ADULT - ASSESSMENT
52 y/o M with pmhx of HTN, DMT2, asthma, COPD, schizoaffective d/o bipolar type, ALEJANDRO on CPAP presents to ED s/p suicide attempt by overdose yesterday afternoon. Admit to telemetry for bradycardia, further monitoring     EKG: Sinus bradycardia @ 54bpm LAD< 
52 y/o M with pmhx of HTN, DMT2, asthma, COPD, schizoaffective d/o bipolar type, ALEJANDRO on CPAP presents to ED s/p suicide attempt by overdose yesterday afternoon. Admit to telemetry for bradycardia, further monitoring.
52 y/o M with pmhx of HTN, DMT2, asthma, COPD, schizoaffective d/o bipolar type, ALEJANDRO on CPAP presents to ED s/p suicide attempt by overdose yesterday afternoon. Admit to telemetry for bradycardia, further monitoring.
54 y/o M with pmhx of HTN, DMT2, asthma, COPD, schizoaffective d/o bipolar type, ALEJANDRO on CPAP presents to ED s/p suicide attempt by overdose yesterday afternoon. Admit to telemetry for bradycardia, further monitoring     EKG: Sinus bradycardia @ 54bpm LAD< 
54 y/o M with pmhx of HTN, DMT2, asthma, COPD, schizoaffective d/o bipolar type, ALEJANDRO on CPAP presents to ED s/p suicide attempt by overdose yesterday afternoon. Admit to telemetry for bradycardia, further monitoring     EKG: Sinus bradycardia @ 54bpm LAD< 
54 y/o M with pmhx of HTN, DMT2, asthma, COPD, schizoaffective d/o bipolar type, ALEJANDRO on CPAP presents to ED s/p suicide attempt by overdose yesterday afternoon. Admit to telemetry for bradycardia, further monitoring.
54 y/o M with pmhx of HTN, DMT2, asthma, COPD, schizoaffective d/o bipolar type, ALEJANDRO on CPAP presents to ED s/p suicide attempt by overdose yesterday afternoon. Admit to telemetry for bradycardia, further monitoring     EKG: Sinus bradycardia @ 54bpm LAD<

## 2019-05-03 NOTE — DISCHARGE NOTE NURSING/CASE MANAGEMENT/SOCIAL WORK - NSDCDPATPORTLINK_GEN_ALL_CORE
You can access the [facility] Portal, offered by [facility], by registering with the following website: [email or web address]
You can access the [facility], offered by [facility], by registering with the following website: [email or web address]

## 2019-05-03 NOTE — PROGRESS NOTE BEHAVIORAL HEALTH - NSBHCHARTREVIEWVS_PSY_A_CORE FT
Vital Signs Last 24 Hrs  T(C): 36.3 (26 Apr 2019 12:43), Max: 37 (25 Apr 2019 15:00)  T(F): 97.3 (26 Apr 2019 12:43), Max: 98.6 (25 Apr 2019 15:00)  HR: 72 (26 Apr 2019 12:43) (56 - 93)  BP: 123/75 (26 Apr 2019 12:43) (107/59 - 155/95)  BP(mean): --  RR: 16 (26 Apr 2019 12:43) (15 - 18)  SpO2: 98% (26 Apr 2019 12:43) (95% - 100%)  CIWA 6
ICU Vital Signs Last 24 Hrs  T(C): 35.7 (30 Apr 2019 09:03), Max: 36.5 (30 Apr 2019 07:00)  T(F): 96.2 (30 Apr 2019 09:03), Max: 97.7 (30 Apr 2019 07:00)  HR: 73 (30 Apr 2019 09:03) (56 - 90)  BP: 149/94 (30 Apr 2019 09:03) (120/84 - 149/94)  BP(mean): --  ABP: --  ABP(mean): --  RR: 18 (30 Apr 2019 09:03) (16 - 18)  SpO2: 100% (30 Apr 2019 09:03) (95% - 100%)
ICU Vital Signs Last 24 Hrs  T(C): 36.5 (03 May 2019 14:06), Max: 36.9 (02 May 2019 18:36)  T(F): 97.7 (03 May 2019 14:06), Max: 98.4 (02 May 2019 18:36)  HR: 92 (03 May 2019 10:00) (84 - 99)  BP: 122/78 (03 May 2019 14:06) (116/75 - 142/78)  BP(mean): --  ABP: --  ABP(mean): --  RR: 18 (03 May 2019 14:06) (16 - 19)  SpO2: 98% (03 May 2019 10:00) (96% - 100%)
ICU Vital Signs Last 24 Hrs  T(C): 36.9 (02 May 2019 13:58), Max: 37 (01 May 2019 15:30)  T(F): 98.4 (02 May 2019 13:58), Max: 98.6 (01 May 2019 15:30)  HR: 98 (02 May 2019 15:10) (68 - 115)  BP: 121/69 (02 May 2019 13:58) (105/58 - 145/101)  BP(mean): --  ABP: --  ABP(mean): --  RR: 18 (02 May 2019 13:58) (16 - 20)  SpO2: 98% (02 May 2019 15:10) (97% - 99%)
T(C): 36.3 (04-27-19 @ 05:52)  T(F): 97.4 (04-27-19 @ 05:52), Max: 98 (04-26-19 @ 10:45)  HR: 61 (04-27-19 @ 05:52) (59 - 98)  BP: 135/89 (04-27-19 @ 05:52) (108/58 - 155/90)  RR:  (16 - 19)  SpO2:  (95% - 100%)  Wt(kg): --
Vital Signs Last 24 Hrs  T(C): 37 (25 Apr 2019 10:00), Max: 37.1 (25 Apr 2019 02:00)  T(F): 98.6 (25 Apr 2019 10:00), Max: 98.7 (25 Apr 2019 02:00)  HR: 88 (25 Apr 2019 10:00) (49 - 94)  BP: 122/68 (25 Apr 2019 10:00) (122/68 - 192/95)  BP(mean): --  RR: 16 (25 Apr 2019 10:00) (16 - 20)  SpO2: 100% (25 Apr 2019 10:00) (95% - 100%)  CIWA 8-9
Vital Signs Last 24 Hrs  T(C): 36.5 (24 Apr 2019 11:00), Max: 36.7 (23 Apr 2019 12:20)  T(F): 97.7 (24 Apr 2019 11:00), Max: 98.1 (24 Apr 2019 06:00)  HR: 65 (24 Apr 2019 11:00) (52 - 77)  BP: 174/85 (24 Apr 2019 11:00) (130/89 - 174/85)  BP(mean): --  RR: 18 (24 Apr 2019 11:00) (17 - 21)  SpO2: 99% (24 Apr 2019 11:00) (96% - 100%)    CIWA 4-18, currently at 11 for disorientation
ICU Vital Signs Last 24 Hrs  T(C): 36.3 (01 May 2019 12:22), Max: 37.6 (01 May 2019 07:37)  T(F): 97.3 (01 May 2019 12:22), Max: 99.7 (01 May 2019 07:37)  HR: 84 (01 May 2019 12:22) (69 - 120)  BP: 138/85 (01 May 2019 12:22) (113/73 - 155/95)  BP(mean): --  ABP: --  ABP(mean): --  RR: 18 (01 May 2019 12:22) (16 - 18)  SpO2: 99% (01 May 2019 12:22) (97% - 100%)
ICU Vital Signs Last 24 Hrs  T(C): 36.3 (29 Apr 2019 11:40), Max: 36.6 (28 Apr 2019 19:02)  T(F): 97.4 (29 Apr 2019 11:40), Max: 97.8 (28 Apr 2019 19:02)  HR: 71 (29 Apr 2019 11:40) (50 - 87)  BP: 123/85 (29 Apr 2019 11:40) (114/78 - 152/106)  BP(mean): --  ABP: --  ABP(mean): --  RR: 18 (29 Apr 2019 11:40) (16 - 18)  SpO2: 100% (29 Apr 2019 11:40) (96% - 100%)
ICU Vital Signs Last 24 Hrs  T(C): 37 (28 Apr 2019 05:10), Max: 37 (28 Apr 2019 05:10)  T(F): 98.6 (28 Apr 2019 05:10), Max: 98.6 (28 Apr 2019 05:10)  HR: 56 (28 Apr 2019 11:52) (50 - 76)  BP: 97/67 (28 Apr 2019 11:52) (97/67 - 140/61)  BP(mean): --  ABP: --  ABP(mean): --  RR: 18 (28 Apr 2019 11:52) (17 - 20)  SpO2: 100% (28 Apr 2019 11:52) (96% - 100%)

## 2019-05-03 NOTE — PROGRESS NOTE BEHAVIORAL HEALTH - ORIENTATION OTHER
Understand he is in hospital s/p suicide attempt but states he jumped out of a window"
BEE, 2019
unable to assess
grossly oriented to time and place
unable to assess

## 2019-05-03 NOTE — PROGRESS NOTE BEHAVIORAL HEALTH - SECONDARY DX2
Alcohol withdrawal syndrome without complication

## 2019-05-03 NOTE — PROGRESS NOTE ADULT - PROVIDER SPECIALTY LIST ADULT
Electrophysiology
Hospitalist
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
## 2019-05-03 NOTE — PROGRESS NOTE BEHAVIORAL HEALTH - NSBHFUPINTERVALCCFT_PSY_A_CORE
"I feel better."
"I know you, you have been seeing me, you are a psychiatrist."
"they keep sticking needles in me"
Nonverbal
"..."
"..."
Nonverbal

## 2019-05-03 NOTE — PROGRESS NOTE BEHAVIORAL HEALTH - PRN MEDS
Received 6mg of Ativan in the last 24 hrs.
As above
IM Thorazine 50mg x 1
IV Ativan 14mg, Phenobarbital 130mg
Received 16mg of Ativan in the last 24 hrs.
Received IV Ativan 2mg x 3 and IM Thorazine 25mg x 2.
Ativan 2 mg IV x2, Thorozine 25 mg IM

## 2019-05-03 NOTE — PROGRESS NOTE BEHAVIORAL HEALTH - NSBHADMITCOORDWITH_PSY_A_CORE
medical staff
social work/[facility] staff/medical staff
medical staff
medical staff/family/Caregiver

## 2019-05-03 NOTE — PROGRESS NOTE ADULT - PROBLEM SELECTOR PLAN 10
Heparin 5000U SQ Q8h  Dispo: likely to inpatient psych.
Heparin 5000U SQ Q8h
Heparin 5000U SQ Q8h  Dispo: likely to inpatient psych.

## 2019-05-03 NOTE — PROGRESS NOTE BEHAVIORAL HEALTH - AFFECT QUALITY
Anxious
Anxious/Irritable
Depressed
Irritable/Anxious
Other
Irritable/Anxious
Anxious
Other

## 2019-05-03 NOTE — PROGRESS NOTE BEHAVIORAL HEALTH - NS ED BHA MED ROS CONSTITUTIONAL SYMPTOMS
Unable to assess
No complaints
Unable to assess
No complaints
Unable to assess

## 2019-05-03 NOTE — PROGRESS NOTE BEHAVIORAL HEALTH - NS ED BHA MSE SPEECH SPONTANEITY
Increased latency
Other
Increased latency
Other

## 2019-05-03 NOTE — PROGRESS NOTE BEHAVIORAL HEALTH - NSBHFUPMEDSE_PSY_A_CORE
Unable to assess
Unable to assess
None known
Unable to assess

## 2019-05-03 NOTE — PROGRESS NOTE BEHAVIORAL HEALTH - NS ED BHA MED ROS CARDIOVASCULAR
Unable to assess
No complaints
Unable to assess
No complaints
Unable to assess

## 2019-05-03 NOTE — PROGRESS NOTE BEHAVIORAL HEALTH - NSBHCONSFOLLOWNEEDS_PSY_A_CORE
Patient needs further psychiatric safety assessment prior to discharge

## 2019-05-03 NOTE — PROGRESS NOTE ADULT - REASON FOR ADMISSION
c/o overdose
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
## 2019-05-03 NOTE — PROGRESS NOTE BEHAVIORAL HEALTH - NSBHCONSULTMEDANXIETY_PSY_A_CORE FT
see above
see above
Ativan 2mg Q6H PRN IM/PO/IV
see above
Ativan 2mg Q6H PRN IM/PO/IV
Ativan 2mg Q6H PRN IM/PO/IV
see above
see above

## 2019-05-03 NOTE — DISCHARGE NOTE NURSING/CASE MANAGEMENT/SOCIAL WORK - NSDCPEEMAIL_GEN_ALL_CORE
[facility] email [email or web address]

## 2019-05-03 NOTE — PROGRESS NOTE BEHAVIORAL HEALTH - PERCEPTIONS
Visual hallucinations
No abnormalities
Other
Other
Visual hallucinations/Other
Other

## 2019-05-03 NOTE — PROGRESS NOTE ADULT - PROBLEM SELECTOR PLAN 6
Psych following  - Thorazine 50mg PO/IM BID-> TID as per psych  - PRN Thorazine 50mg IM/PO Q6h for severe agitation/combative behavior  - Patient will require inpatient psych admission, medically optimized at this time

## 2019-05-03 NOTE — PROGRESS NOTE ADULT - PROBLEM SELECTOR PROBLEM 1
Bradycardia

## 2019-05-03 NOTE — PROGRESS NOTE BEHAVIORAL HEALTH - NSBHCONSULTMEDPRNREASON_PSY_A_CORE
agitation.../anxiety...
anxiety.../agitation...
agitation.../anxiety...
agitation.../anxiety...
anxiety.../agitation...
agitation.../anxiety...

## 2019-05-03 NOTE — PROGRESS NOTE BEHAVIORAL HEALTH - NSBHFUPREASONCONS_PSY_A_CORE
agitation/psychosis/med management
psychosis/agitation/med management
suicidality/alcohol
agitation/suicidality
suicidality/alcohol
suicidality/alcohol

## 2019-05-03 NOTE — PROGRESS NOTE BEHAVIORAL HEALTH - PRIMARY DX
Suicide attempt by multiple drug overdose

## 2019-05-03 NOTE — PROGRESS NOTE ADULT - PROBLEM SELECTOR PLAN 9
Continue lisinopril  Monitor BP and adjust medication as necessary  DASH diet

## 2019-05-03 NOTE — PROGRESS NOTE BEHAVIORAL HEALTH - AXIS III
HTN, Asthma, DM2, sleep apnea, COPD

## 2019-05-03 NOTE — PROGRESS NOTE ADULT - PROBLEM SELECTOR PROBLEM 4
Chest pain

## 2019-05-03 NOTE — PROGRESS NOTE BEHAVIORAL HEALTH - RISK ASSESSMENT
High risk and will need inpatient admission.  Modifiable risk factors include current alcohol withdrawal, impulsivity, lack of sobriety, impulsivity, and suspected CAH.  Unmodifiable risk factors include history of x2 suicide attempts including current attempt.  Protective factors include residential stability.  Pt will likely need inpatient psychiatric hospitalization once medically stabilized.

## 2019-05-03 NOTE — PROGRESS NOTE BEHAVIORAL HEALTH - NS ED BHA MED ROS ENDOCRINE
Unable to assess
Unable to assess
No complaints
Unable to assess
No complaints
Unable to assess

## 2019-05-03 NOTE — PROGRESS NOTE BEHAVIORAL HEALTH - NSBHCONSULTOBS_PSY_A_CORE
Constant observation

## 2019-05-03 NOTE — PROGRESS NOTE BEHAVIORAL HEALTH - NSBHCONSULTSUBSTANCEALCOHOL_PSY_A_CORE FT
Standing Ativan taper as follows:  3mg IV Q4H x6  2mg IV Q4H x6  1.5mg IV Q4H x6  1mg IV Q4H x6  1mg IV Q6H x4
see above
see above
Standing Ativan taper as follows:  3mg IV Q4H x6  2mg IV Q4H x6  1.5mg IV Q4H x6  1mg IV Q4H x6  1mg IV Q6H x4
see above
Standing Ativan taper as follows:  3mg IV Q4H x6  2mg IV Q4H x6  1.5mg IV Q4H x6  1mg IV Q4H x6  1mg IV Q6H x4
see above
see above

## 2019-05-03 NOTE — PROGRESS NOTE ADULT - PROBLEM SELECTOR PLAN 5
CTH negative, likely 2/2 overdose and bradycardia   - resolved.
CTH negative, likely 2/2 overdose and bradycardia   - continue to monitor on tele
CTH negative, likely 2/2 overdose and bradycardia   - resolved.

## 2019-05-03 NOTE — PROGRESS NOTE BEHAVIORAL HEALTH - NSBHADMITCOUNSEL_PSY_A_CORE
importance of adherence to chosen treatment/risks and benefits of treatment options/client/family/caregiver education
risks and benefits of treatment options/importance of adherence to chosen treatment/client/family/caregiver education
importance of adherence to chosen treatment/client/family/caregiver education
risks and benefits of treatment options/client/family/caregiver education
client/family/caregiver education
risks and benefits of treatment options/importance of adherence to chosen treatment

## 2019-05-03 NOTE — PROGRESS NOTE BEHAVIORAL HEALTH - NSBHCONSULTPRIMARYDISCUSSYES_PSY_A_CORE FT
Discussed with Dr. Pimentel and other team members
Discussed with Dr. Pimentel and other team members
team was paged , awaiting call back

## 2019-05-03 NOTE — PROGRESS NOTE ADULT - PROBLEM SELECTOR PROBLEM 9
Hypertension, unspecified type

## 2019-05-03 NOTE — DISCHARGE NOTE PROVIDER - HOSPITAL COURSE
54 y/o M with PMHx of HTN, DMT2, asthma, COPD, schizoaffective d/o bipolar type, ALEJANDRO on CPAP presents to ED s/p suicide attempt by overdose. Admit to telemetry for bradycardia, further monitoring.         1. Bradycardia.      - As per toxicology, Ziprasidone can cause QTc prolongation but does not typically present with bradycardia. Oxycodone, Gabapentin, and Meloxicam also do not typically present with bradycardia    - TSH WNL, HR improved on tele    - TTE: endocardium not well visualized but otherwise normal.     - Evaluated by EP, no Pacemaker indicated at this time            2. Suicide attempt by multiple drug overdose.     - Psych following, will require inpatient psych. Transfer to [facility].            3. Alcohol withdrawal syndrome.      - Low suspicion for withdrawal at this time    - Patient completed high risk Ativan taper as per psych     - Thorazine 50mg po TID for agitation as patient has an intolerance to Haldol    - Thiamine, Folic acid, MV             4. Chest pain.      - Low suspicion for ACS, EKG without ST/Twave changes, indeterminant troponin trended down    - TTE: normal            5. Dizziness.      - CTH negative, likely 2/2 overdose and bradycardia     - resolved.             6. Schizoaffective disorder, bipolar type.     - Psych following    - Thorazine 50mg PO TID as per psych    - Patient will require inpatient psych admission, medically optimized at this time.            7. Obstructive sleep apnea.      - Continue CPAP qhs.         Case discussed with Dr. Silva and patient is medically stable for transfer to [facility].

## 2019-05-03 NOTE — PROGRESS NOTE ADULT - PROBLEM SELECTOR PROBLEM 3
Alcohol withdrawal syndrome without complication

## 2019-05-03 NOTE — PROGRESS NOTE BEHAVIORAL HEALTH - THOUGHT CONTENT
Suicidality/Other/Preoccupations/Hopelessness
Hopelessness/Preoccupations/Suicidality/Other
Suicidality/Other/Preoccupations/Hopelessness
Unremarkable
Suicidality/Other
Suicidality/Other/Hopelessness
Hopelessness/Suicidality/Other
Suicidality/Other

## 2019-05-04 PROCEDURE — 99232 SBSQ HOSP IP/OBS MODERATE 35: CPT

## 2019-05-04 RX ORDER — ALBUTEROL 90 UG/1
2 AEROSOL, METERED ORAL EVERY 6 HOURS
Qty: 0 | Refills: 0 | Status: DISCONTINUED | OUTPATIENT
Start: 2019-05-04 | End: 2019-05-14

## 2019-05-04 RX ADMIN — LISINOPRIL 5 MILLIGRAM(S): 2.5 TABLET ORAL at 08:44

## 2019-05-04 RX ADMIN — Medication 100 MILLIGRAM(S): at 08:44

## 2019-05-04 RX ADMIN — Medication 1 MILLIGRAM(S): at 08:44

## 2019-05-04 RX ADMIN — Medication 1 TABLET(S): at 08:44

## 2019-05-04 NOTE — CONSULT NOTE ADULT - SUBJECTIVE AND OBJECTIVE BOX
HPI:   53 year old male with psych disorder now with exacerbation of depression s/p OD of oxycodone, gabapentin, meloxicam and depakote admitted to [facility] telemetry 2019 with bradycardia.  Pt also with chest pain ruled out for MI and dizziness.  Pt medically stabilized and now transferred to [facility].    PAST MEDICAL & SURGICAL HISTORY:  COPD with asthma  Obstructive sleep apnea: CPAP for 7-8 years  Schizoaffective disorder, bipolar type  Diabetes: Type 2; pt lost weight, no longer taking meds  Hypertension, unspecified type  Paranoia  Uncomplicated asthma, unspecified asthma severity  S/P appendectomy      Review of Systems:   CONSTITUTIONAL: No fever, weight loss, or fatigue  EYES: No eye pain, visual disturbances, or discharge  ENMT:  No difficulty hearing, tinnitus, vertigo; No sinus or throat pain  NECK: No pain or stiffness  RESPIRATORY: +ASTHMA/COPD ON ALBUTERAL INHALER.  CARDIOVASCULAR: No chest pain, palpitations, dizziness, or leg swelling  GASTROINTESTINAL: No abdominal or epigastric pain. No nausea, vomiting, or hematemesis; No diarrhea or constipation. No melena or hematochezia.  GENITOURINARY: No dysuria, frequency, hematuria, or incontinence  NEUROLOGICAL: No headaches, memory loss, loss of strength, numbness, or tremors  SKIN: No itching, burning, rashes, or lesions   LYMPH NODES: No enlarged glands  ENDOCRINE: No heat or cold intolerance; No hair loss  MUSCULOSKELETAL: No joint pain or swelling; No muscle, back, or extremity pain  HEME/LYMPH: No easy bruising, or bleeding gums  ALLERY AND IMMUNOLOGIC: No hives or eczema    Allergies    No Known Allergies    Intolerances    Haldol (Unknown)      Social History:  +CIGS, +ETOH, +DRUGS.    FAMILY HISTORY:  FH: cirrhosis: maternal grandmother, uncle      MEDICATIONS  (STANDING):  chlorproMAZINE    Tablet 50 milliGRAM(s) Oral three times a day  folic acid 1 milliGRAM(s) Oral daily  lisinopril 5 milliGRAM(s) Oral daily  multivitamin 1 Tablet(s) Oral daily  thiamine 100 milliGRAM(s) Oral daily    MEDICATIONS  (PRN):  ALBUTerol    90 MICROgram(s) HFA Inhaler 2 Puff(s) Inhalation every 6 hours PRN Shortness of Breath and/or Wheezing  chlorproMAZINE    Injectable 50 milliGRAM(s) IntraMuscular once PRN Agitation  chlorproMAZINE    Tablet 50 milliGRAM(s) Oral every 6 hours PRN Agitation  diphenhydrAMINE 50 milliGRAM(s) Oral every 6 hours PRN Extrapyramidal prophylaxis  diphenhydrAMINE   Injectable 50 milliGRAM(s) IntraMuscular once PRN Extrapyramidal prophylaxis  LORazepam     Tablet 2 milliGRAM(s) Oral every 6 hours PRN Agitation  LORazepam   Injectable 2 milliGRAM(s) IntraMuscular once PRN Agitation      VS:  SITTING  119/83 98 STANDING 102/80 99 T 98.3     PHYSICAL EXAM:  GENERAL: NAD, well-developed  HEAD:  Atraumatic, Normocephalic  EYES: EOMI, conjunctiva and sclera clear  NECK: Supple, No JVD  CHEST/LUNG: Clear to auscultation bilaterally; No wheeze  HEART: Regular rate and rhythm; No murmurs, rubs, or gallops  ABDOMEN: Soft, Nontender, Nondistended; Bowel sounds present  EXTREMITIES:   No clubbing, cyanosis, or edema  NEUROLOGY: non-focal  SKIN: No rashes or lesions    LABS:                        15.5   10.77 )-----------( 210      ( 03 May 2019 05:15 )             45.4     05-03    133<L>  |  97<L>  |  15  ----------------------------<  109<H>  4.3   |  23  |  0.90    Ca    10.5      03 May 2019 05:15  Mg     1.9     05-03        CARDIAC MARKERS ( 03 May 2019 05:15 )  x     / x     / 705 u/L / x     / x          2019  PROT 6.8 ALB 3.9 ALK PHOS 86 SGOT 22 SGPT 17  CHOL 152 TRIG 70 HDL 52 LDL 94 HGAIC 5.4    2019  TSH 2.33    EK2019  SB LAD POOR R WAVE PROGRESSION  2019 TTE:  EF 45% NL LV SYSTOLIC FUNCTION.    RADIOLOGY & ADDITIONAL TESTS:    -2019 CT OF HEAD W/O C:  UNREMARKABLE HEAD CT  2019 CXR:  CLEAR LUNGS    Consultant(s) Notes Reviewed:  NOTES REVIEWED    Care Discussed with Consultants/Other Providers:  ATTENDING AND STAFF HPI:   53 year old male with psych disorder now with exacerbation of depression s/p OD of oxycodone, gabapentin, meloxicam and depakote admitted to [facility] telemetry 2019 with bradycardia.  Pt also with chest pain ruled out for MI and dizziness.  Pt medically stabilized and now transferred to [facility].    +COPD/ASTHMA.  PT DENIES SOB.    PAST MEDICAL & SURGICAL HISTORY:  COPD with asthma  Obstructive sleep apnea: CPAP for 7-8 years  Schizoaffective disorder, bipolar type  Diabetes: Type 2; pt lost weight, no longer taking meds  Hypertension, unspecified type  Paranoia  Uncomplicated asthma, unspecified asthma severity  S/P appendectomy      Review of Systems:   CONSTITUTIONAL: No fever, weight loss, or fatigue  EYES: No eye pain, visual disturbances, or discharge  ENMT:  No difficulty hearing, tinnitus, vertigo; No sinus or throat pain  NECK: No pain or stiffness  RESPIRATORY: +ASTHMA/COPD ON ALBUTERAL INHALER.  CARDIOVASCULAR: No chest pain, palpitations, dizziness, or leg swelling  GASTROINTESTINAL: No abdominal or epigastric pain. No nausea, vomiting, or hematemesis; No diarrhea or constipation. No melena or hematochezia.  GENITOURINARY: No dysuria, frequency, hematuria, or incontinence  NEUROLOGICAL: No headaches, memory loss, loss of strength, numbness, or tremors  SKIN: No itching, burning, rashes, or lesions   LYMPH NODES: No enlarged glands  ENDOCRINE: No heat or cold intolerance; No hair loss  MUSCULOSKELETAL: No joint pain or swelling; No muscle, back, or extremity pain  HEME/LYMPH: No easy bruising, or bleeding gums  ALLERY AND IMMUNOLOGIC: No hives or eczema    Allergies    No Known Allergies    Intolerances    Haldol (Unknown)      Social History:  +CIGS, +ETOH, +DRUGS.    FAMILY HISTORY:  FH: cirrhosis: maternal grandmother, uncle      MEDICATIONS  (STANDING):  chlorproMAZINE    Tablet 50 milliGRAM(s) Oral three times a day  folic acid 1 milliGRAM(s) Oral daily  lisinopril 5 milliGRAM(s) Oral daily  multivitamin 1 Tablet(s) Oral daily  thiamine 100 milliGRAM(s) Oral daily    MEDICATIONS  (PRN):  ALBUTerol    90 MICROgram(s) HFA Inhaler 2 Puff(s) Inhalation every 6 hours PRN Shortness of Breath and/or Wheezing  chlorproMAZINE    Injectable 50 milliGRAM(s) IntraMuscular once PRN Agitation  chlorproMAZINE    Tablet 50 milliGRAM(s) Oral every 6 hours PRN Agitation  diphenhydrAMINE 50 milliGRAM(s) Oral every 6 hours PRN Extrapyramidal prophylaxis  diphenhydrAMINE   Injectable 50 milliGRAM(s) IntraMuscular once PRN Extrapyramidal prophylaxis  LORazepam     Tablet 2 milliGRAM(s) Oral every 6 hours PRN Agitation  LORazepam   Injectable 2 milliGRAM(s) IntraMuscular once PRN Agitation      VS:  SITTING  119/83 98 STANDING 102/80 99 T 98.3   OXYGEN SAT 99%    PHYSICAL EXAM:  GENERAL: NAD, well-developed  HEAD:  Atraumatic, Normocephalic  EYES: EOMI, conjunctiva and sclera clear  NECK: Supple, No JVD  CHEST/LUNG: Clear to auscultation bilaterally; No wheeze  HEART: Regular rate and rhythm; No murmurs, rubs, or gallops  ABDOMEN: Soft, Nontender, Nondistended; Bowel sounds present  EXTREMITIES:   No clubbing, cyanosis, or edema  NEUROLOGY: non-focal  SKIN: No rashes or lesions    LABS:                        15.5   10.77 )-----------( 210      ( 03 May 2019 05:15 )             45.4     05-03    133<L>  |  97<L>  |  15  ----------------------------<  109<H>  4.3   |  23  |  0.90    Ca    10.5      03 May 2019 05:15  Mg     1.9     05-03        CARDIAC MARKERS ( 03 May 2019 05:15 )  x     / x     / 705 u/L / x     / x          2019  PROT 6.8 ALB 3.9 ALK PHOS 86 SGOT 22 SGPT 17  CHOL 152 TRIG 70 HDL 52 LDL 94 HGAIC 5.4    2019  TSH 2.33    EK2019  SB LAD POOR R WAVE PROGRESSION  2019 TTE:  EF 45% NL LV SYSTOLIC FUNCTION.    RADIOLOGY & ADDITIONAL TESTS:    -2019 CT OF HEAD W/O C:  UNREMARKABLE HEAD CT  2019 CXR:  CLEAR LUNGS    Consultant(s) Notes Reviewed:  NOTES REVIEWED    Care Discussed with Consultants/Other Providers:  ATTENDING AND STAFF

## 2019-05-04 NOTE — CONSULT NOTE ADULT - CONSULT REASON
53 year old male with psych disorder now with exacerbation s/p OD with bradycardia now stabilized medically and transferred to [facility].

## 2019-05-04 NOTE — CONSULT NOTE ADULT - ASSESSMENT
53 year old male with psych disorder now with exacerbation s/p SA with OD of several medications admitted to [facility] telemetry for bradycardia, now stabilized and transferred to [facility].  1.  S/P OD NOW STABILIZED.  2.  TACHYCARDIA FROM OD NOW RESOLVED.  3.  ASTHMA/COPD:  ALBUTERAL INHALER PRN.  4.  HTN:  BP STABLE WITH LISINOPRIL.  5.  SLEEP APNEA:  CPAP  6.  PSYCH: AS PER ATTENDING 53 year old male with psych disorder now with exacerbation s/p SA with OD of several medications admitted to [facility] telemetry for bradycardia, now stabilized and transferred to [facility].  1.  S/P OD NOW STABILIZED.  2.  TACHYCARDIA FROM OD NOW RESOLVED.  3.  ASTHMA/COPD:  ALBUTERAL INHALER PRN.  OXYGEN SAT 99%  4.  HTN:  BP STABLE WITH LISINOPRIL.  5.  SLEEP APNEA:  CPAP  6.  PSYCH: AS PER ATTENDING

## 2019-05-05 LAB
FOLATE SERPL-MCNC: > 20 NG/ML — HIGH (ref 4.7–20)
VIT B12 SERPL-MCNC: 841 PG/ML — SIGNIFICANT CHANGE UP (ref 200–900)

## 2019-05-05 PROCEDURE — 99232 SBSQ HOSP IP/OBS MODERATE 35: CPT

## 2019-05-05 RX ORDER — LANOLIN ALCOHOL/MO/W.PET/CERES
3 CREAM (GRAM) TOPICAL AT BEDTIME
Qty: 0 | Refills: 0 | Status: DISCONTINUED | OUTPATIENT
Start: 2019-05-05 | End: 2019-05-14

## 2019-05-05 RX ORDER — IBUPROFEN 200 MG
600 TABLET ORAL EVERY 6 HOURS
Qty: 0 | Refills: 0 | Status: DISCONTINUED | OUTPATIENT
Start: 2019-05-05 | End: 2019-05-14

## 2019-05-05 RX ORDER — FLUOXETINE HCL 10 MG
20 CAPSULE ORAL DAILY
Qty: 0 | Refills: 0 | Status: DISCONTINUED | OUTPATIENT
Start: 2019-05-05 | End: 2019-05-14

## 2019-05-05 RX ORDER — TRAZODONE HCL 50 MG
50 TABLET ORAL AT BEDTIME
Qty: 0 | Refills: 0 | Status: DISCONTINUED | OUTPATIENT
Start: 2019-05-05 | End: 2019-05-14

## 2019-05-05 RX ORDER — HYDROXYZINE HCL 10 MG
50 TABLET ORAL EVERY 6 HOURS
Qty: 0 | Refills: 0 | Status: DISCONTINUED | OUTPATIENT
Start: 2019-05-05 | End: 2019-05-14

## 2019-05-05 RX ADMIN — Medication 1 TABLET(S): at 08:27

## 2019-05-05 RX ADMIN — Medication 600 MILLIGRAM(S): at 12:24

## 2019-05-05 RX ADMIN — Medication 50 MILLIGRAM(S): at 22:35

## 2019-05-05 RX ADMIN — Medication 100 MILLIGRAM(S): at 08:27

## 2019-05-05 RX ADMIN — LISINOPRIL 5 MILLIGRAM(S): 2.5 TABLET ORAL at 08:27

## 2019-05-05 RX ADMIN — Medication 1 MILLIGRAM(S): at 08:27

## 2019-05-05 RX ADMIN — Medication 600 MILLIGRAM(S): at 11:12

## 2019-05-06 PROCEDURE — 99232 SBSQ HOSP IP/OBS MODERATE 35: CPT

## 2019-05-06 RX ORDER — ZIPRASIDONE HYDROCHLORIDE 20 MG/1
20 CAPSULE ORAL
Qty: 0 | Refills: 0 | Status: DISCONTINUED | OUTPATIENT
Start: 2019-05-06 | End: 2019-05-06

## 2019-05-06 RX ORDER — LURASIDONE HYDROCHLORIDE 40 MG/1
20 TABLET ORAL DAILY
Qty: 0 | Refills: 0 | Status: DISCONTINUED | OUTPATIENT
Start: 2019-05-07 | End: 2019-05-07

## 2019-05-06 RX ADMIN — Medication 1 TABLET(S): at 10:02

## 2019-05-06 RX ADMIN — Medication 1 MILLIGRAM(S): at 10:02

## 2019-05-06 RX ADMIN — Medication 20 MILLIGRAM(S): at 10:02

## 2019-05-06 RX ADMIN — Medication 100 MILLIGRAM(S): at 10:02

## 2019-05-06 RX ADMIN — LISINOPRIL 5 MILLIGRAM(S): 2.5 TABLET ORAL at 10:02

## 2019-05-07 PROCEDURE — 99231 SBSQ HOSP IP/OBS SF/LOW 25: CPT

## 2019-05-07 RX ORDER — OLANZAPINE 15 MG/1
5 TABLET, FILM COATED ORAL ONCE
Qty: 0 | Refills: 0 | Status: DISCONTINUED | OUTPATIENT
Start: 2019-05-07 | End: 2019-05-14

## 2019-05-07 RX ORDER — ZIPRASIDONE HYDROCHLORIDE 20 MG/1
20 CAPSULE ORAL
Qty: 0 | Refills: 0 | Status: DISCONTINUED | OUTPATIENT
Start: 2019-05-07 | End: 2019-05-14

## 2019-05-07 RX ADMIN — ALBUTEROL 2 PUFF(S): 90 AEROSOL, METERED ORAL at 23:56

## 2019-05-07 RX ADMIN — LISINOPRIL 5 MILLIGRAM(S): 2.5 TABLET ORAL at 09:00

## 2019-05-07 RX ADMIN — Medication 1 TABLET(S): at 09:00

## 2019-05-07 RX ADMIN — Medication 1 MILLIGRAM(S): at 09:00

## 2019-05-07 RX ADMIN — Medication 100 MILLIGRAM(S): at 09:00

## 2019-05-07 RX ADMIN — Medication 20 MILLIGRAM(S): at 09:00

## 2019-05-08 PROCEDURE — 99231 SBSQ HOSP IP/OBS SF/LOW 25: CPT

## 2019-05-08 RX ADMIN — ALBUTEROL 2 PUFF(S): 90 AEROSOL, METERED ORAL at 06:21

## 2019-05-08 RX ADMIN — ALBUTEROL 2 PUFF(S): 90 AEROSOL, METERED ORAL at 22:13

## 2019-05-08 RX ADMIN — ZIPRASIDONE HYDROCHLORIDE 20 MILLIGRAM(S): 20 CAPSULE ORAL at 22:13

## 2019-05-08 RX ADMIN — Medication 50 MILLIGRAM(S): at 23:58

## 2019-05-08 RX ADMIN — Medication 1 MILLIGRAM(S): at 23:58

## 2019-05-08 RX ADMIN — ALBUTEROL 2 PUFF(S): 90 AEROSOL, METERED ORAL at 13:59

## 2019-05-09 PROCEDURE — 99231 SBSQ HOSP IP/OBS SF/LOW 25: CPT

## 2019-05-09 RX ORDER — DIPHENHYDRAMINE HCL 50 MG
50 CAPSULE ORAL ONCE
Refills: 0 | Status: COMPLETED | OUTPATIENT
Start: 2019-05-09 | End: 2019-05-09

## 2019-05-09 RX ADMIN — Medication 100 MILLIGRAM(S): at 08:12

## 2019-05-09 RX ADMIN — ZIPRASIDONE HYDROCHLORIDE 20 MILLIGRAM(S): 20 CAPSULE ORAL at 08:12

## 2019-05-09 RX ADMIN — ALBUTEROL 2 PUFF(S): 90 AEROSOL, METERED ORAL at 16:07

## 2019-05-09 RX ADMIN — LISINOPRIL 5 MILLIGRAM(S): 2.5 TABLET ORAL at 08:12

## 2019-05-09 RX ADMIN — Medication 20 MILLIGRAM(S): at 08:12

## 2019-05-09 RX ADMIN — Medication 1 TABLET(S): at 08:12

## 2019-05-09 RX ADMIN — ZIPRASIDONE HYDROCHLORIDE 20 MILLIGRAM(S): 20 CAPSULE ORAL at 20:50

## 2019-05-09 RX ADMIN — Medication 1 MILLIGRAM(S): at 08:12

## 2019-05-09 RX ADMIN — Medication 50 MILLIGRAM(S): at 21:21

## 2019-05-10 PROCEDURE — 99231 SBSQ HOSP IP/OBS SF/LOW 25: CPT

## 2019-05-10 RX ORDER — CELECOXIB 200 MG/1
200 CAPSULE ORAL DAILY
Refills: 0 | Status: DISCONTINUED | OUTPATIENT
Start: 2019-05-10 | End: 2019-05-14

## 2019-05-10 RX ADMIN — ALBUTEROL 2 PUFF(S): 90 AEROSOL, METERED ORAL at 08:42

## 2019-05-10 RX ADMIN — ZIPRASIDONE HYDROCHLORIDE 20 MILLIGRAM(S): 20 CAPSULE ORAL at 08:42

## 2019-05-10 RX ADMIN — CELECOXIB 200 MILLIGRAM(S): 200 CAPSULE ORAL at 13:36

## 2019-05-10 RX ADMIN — Medication 1 MILLIGRAM(S): at 08:42

## 2019-05-10 RX ADMIN — Medication 2 MILLIGRAM(S): at 23:57

## 2019-05-10 RX ADMIN — ZIPRASIDONE HYDROCHLORIDE 20 MILLIGRAM(S): 20 CAPSULE ORAL at 22:13

## 2019-05-10 RX ADMIN — Medication 100 MILLIGRAM(S): at 08:42

## 2019-05-10 RX ADMIN — Medication 20 MILLIGRAM(S): at 08:42

## 2019-05-10 RX ADMIN — LISINOPRIL 5 MILLIGRAM(S): 2.5 TABLET ORAL at 08:42

## 2019-05-10 RX ADMIN — Medication 1 TABLET(S): at 08:42

## 2019-05-11 RX ADMIN — Medication 2 MILLIGRAM(S): at 20:59

## 2019-05-11 RX ADMIN — Medication 20 MILLIGRAM(S): at 08:09

## 2019-05-11 RX ADMIN — Medication 50 MILLIGRAM(S): at 20:59

## 2019-05-11 RX ADMIN — LISINOPRIL 5 MILLIGRAM(S): 2.5 TABLET ORAL at 08:09

## 2019-05-11 RX ADMIN — Medication 100 MILLIGRAM(S): at 08:09

## 2019-05-11 RX ADMIN — ZIPRASIDONE HYDROCHLORIDE 20 MILLIGRAM(S): 20 CAPSULE ORAL at 20:59

## 2019-05-11 RX ADMIN — CELECOXIB 200 MILLIGRAM(S): 200 CAPSULE ORAL at 09:55

## 2019-05-11 RX ADMIN — Medication 1 MILLIGRAM(S): at 08:09

## 2019-05-11 RX ADMIN — Medication 3 MILLIGRAM(S): at 20:59

## 2019-05-11 RX ADMIN — Medication 1 TABLET(S): at 08:09

## 2019-05-11 RX ADMIN — ZIPRASIDONE HYDROCHLORIDE 20 MILLIGRAM(S): 20 CAPSULE ORAL at 08:09

## 2019-05-12 RX ADMIN — ZIPRASIDONE HYDROCHLORIDE 20 MILLIGRAM(S): 20 CAPSULE ORAL at 08:30

## 2019-05-12 RX ADMIN — Medication 1 MILLIGRAM(S): at 08:30

## 2019-05-12 RX ADMIN — Medication 20 MILLIGRAM(S): at 08:30

## 2019-05-12 RX ADMIN — Medication 1 TABLET(S): at 08:30

## 2019-05-12 RX ADMIN — LISINOPRIL 5 MILLIGRAM(S): 2.5 TABLET ORAL at 08:30

## 2019-05-12 RX ADMIN — Medication 100 MILLIGRAM(S): at 08:30

## 2019-05-12 RX ADMIN — ALBUTEROL 2 PUFF(S): 90 AEROSOL, METERED ORAL at 16:39

## 2019-05-12 RX ADMIN — Medication 50 MILLIGRAM(S): at 20:44

## 2019-05-12 RX ADMIN — ZIPRASIDONE HYDROCHLORIDE 20 MILLIGRAM(S): 20 CAPSULE ORAL at 20:44

## 2019-05-12 RX ADMIN — CELECOXIB 200 MILLIGRAM(S): 200 CAPSULE ORAL at 10:39

## 2019-05-12 RX ADMIN — CELECOXIB 200 MILLIGRAM(S): 200 CAPSULE ORAL at 09:07

## 2019-05-12 RX ADMIN — ALBUTEROL 2 PUFF(S): 90 AEROSOL, METERED ORAL at 09:44

## 2019-05-13 PROCEDURE — 99231 SBSQ HOSP IP/OBS SF/LOW 25: CPT

## 2019-05-13 RX ORDER — FLUOXETINE HCL 10 MG
1 CAPSULE ORAL
Qty: 30 | Refills: 0
Start: 2019-05-13 | End: 2019-06-11

## 2019-05-13 RX ORDER — THIAMINE MONONITRATE (VIT B1) 100 MG
1 TABLET ORAL
Qty: 30 | Refills: 0
Start: 2019-05-13 | End: 2019-06-11

## 2019-05-13 RX ORDER — TRAZODONE HCL 50 MG
1 TABLET ORAL
Qty: 30 | Refills: 0
Start: 2019-05-13 | End: 2019-06-11

## 2019-05-13 RX ORDER — ZIPRASIDONE HYDROCHLORIDE 20 MG/1
1 CAPSULE ORAL
Qty: 60 | Refills: 0
Start: 2019-05-13 | End: 2019-06-11

## 2019-05-13 RX ORDER — FOLIC ACID 0.8 MG
1 TABLET ORAL
Qty: 30 | Refills: 0
Start: 2019-05-13 | End: 2019-06-11

## 2019-05-13 RX ORDER — LISINOPRIL 2.5 MG/1
1 TABLET ORAL
Qty: 0 | Refills: 0 | DISCHARGE

## 2019-05-13 RX ORDER — LISINOPRIL 2.5 MG/1
1 TABLET ORAL
Qty: 30 | Refills: 0
Start: 2019-05-13 | End: 2019-06-11

## 2019-05-13 RX ORDER — CELECOXIB 200 MG/1
1 CAPSULE ORAL
Qty: 30 | Refills: 0
Start: 2019-05-13 | End: 2019-06-11

## 2019-05-13 RX ORDER — ALBUTEROL 90 UG/1
2 AEROSOL, METERED ORAL
Qty: 1 | Refills: 0
Start: 2019-05-13 | End: 2019-06-11

## 2019-05-13 RX ORDER — LANOLIN ALCOHOL/MO/W.PET/CERES
1 CREAM (GRAM) TOPICAL
Qty: 30 | Refills: 0
Start: 2019-05-13 | End: 2019-06-11

## 2019-05-13 RX ADMIN — Medication 1 MILLIGRAM(S): at 08:19

## 2019-05-13 RX ADMIN — ZIPRASIDONE HYDROCHLORIDE 20 MILLIGRAM(S): 20 CAPSULE ORAL at 08:19

## 2019-05-13 RX ADMIN — Medication 1 TABLET(S): at 08:19

## 2019-05-13 RX ADMIN — CELECOXIB 200 MILLIGRAM(S): 200 CAPSULE ORAL at 08:19

## 2019-05-13 RX ADMIN — ZIPRASIDONE HYDROCHLORIDE 20 MILLIGRAM(S): 20 CAPSULE ORAL at 21:15

## 2019-05-13 RX ADMIN — Medication 2 MILLIGRAM(S): at 00:16

## 2019-05-13 RX ADMIN — Medication 100 MILLIGRAM(S): at 08:19

## 2019-05-13 RX ADMIN — CELECOXIB 200 MILLIGRAM(S): 200 CAPSULE ORAL at 09:19

## 2019-05-13 RX ADMIN — LISINOPRIL 5 MILLIGRAM(S): 2.5 TABLET ORAL at 08:19

## 2019-05-13 RX ADMIN — Medication 20 MILLIGRAM(S): at 08:19

## 2019-05-13 RX ADMIN — Medication 3 MILLIGRAM(S): at 00:16

## 2019-05-14 VITALS — TEMPERATURE: 98 F

## 2019-05-14 LAB
ALBUMIN SERPL ELPH-MCNC: 4.1 G/DL — SIGNIFICANT CHANGE UP (ref 3.3–5)
ALP SERPL-CCNC: 102 U/L — SIGNIFICANT CHANGE UP (ref 40–120)
ALT FLD-CCNC: 28 U/L — SIGNIFICANT CHANGE UP (ref 4–41)
ANION GAP SERPL CALC-SCNC: 13 MMO/L — SIGNIFICANT CHANGE UP (ref 7–14)
AST SERPL-CCNC: 19 U/L — SIGNIFICANT CHANGE UP (ref 4–40)
BASOPHILS # BLD AUTO: 0.03 K/UL — SIGNIFICANT CHANGE UP (ref 0–0.2)
BASOPHILS NFR BLD AUTO: 0.4 % — SIGNIFICANT CHANGE UP (ref 0–2)
BILIRUB SERPL-MCNC: 0.5 MG/DL — SIGNIFICANT CHANGE UP (ref 0.2–1.2)
BUN SERPL-MCNC: 15 MG/DL — SIGNIFICANT CHANGE UP (ref 7–23)
CALCIUM SERPL-MCNC: 11.1 MG/DL — HIGH (ref 8.4–10.5)
CHLORIDE SERPL-SCNC: 104 MMOL/L — SIGNIFICANT CHANGE UP (ref 98–107)
CK SERPL-CCNC: 97 U/L — SIGNIFICANT CHANGE UP (ref 30–200)
CO2 SERPL-SCNC: 20 MMOL/L — LOW (ref 22–31)
CREAT SERPL-MCNC: 0.9 MG/DL — SIGNIFICANT CHANGE UP (ref 0.5–1.3)
EOSINOPHIL # BLD AUTO: 0.11 K/UL — SIGNIFICANT CHANGE UP (ref 0–0.5)
EOSINOPHIL NFR BLD AUTO: 1.6 % — SIGNIFICANT CHANGE UP (ref 0–6)
GLUCOSE SERPL-MCNC: 64 MG/DL — LOW (ref 70–99)
HBA1C BLD-MCNC: 5.4 % — SIGNIFICANT CHANGE UP (ref 4–5.6)
HCT VFR BLD CALC: 44.9 % — SIGNIFICANT CHANGE UP (ref 39–50)
HGB BLD-MCNC: 15 G/DL — SIGNIFICANT CHANGE UP (ref 13–17)
IMM GRANULOCYTES NFR BLD AUTO: 0.6 % — SIGNIFICANT CHANGE UP (ref 0–1.5)
LYMPHOCYTES # BLD AUTO: 1.74 K/UL — SIGNIFICANT CHANGE UP (ref 1–3.3)
LYMPHOCYTES # BLD AUTO: 25.7 % — SIGNIFICANT CHANGE UP (ref 13–44)
MCHC RBC-ENTMCNC: 31.2 PG — SIGNIFICANT CHANGE UP (ref 27–34)
MCHC RBC-ENTMCNC: 33.4 % — SIGNIFICANT CHANGE UP (ref 32–36)
MCV RBC AUTO: 93.3 FL — SIGNIFICANT CHANGE UP (ref 80–100)
MONOCYTES # BLD AUTO: 0.32 K/UL — SIGNIFICANT CHANGE UP (ref 0–0.9)
MONOCYTES NFR BLD AUTO: 4.7 % — SIGNIFICANT CHANGE UP (ref 2–14)
NEUTROPHILS # BLD AUTO: 4.54 K/UL — SIGNIFICANT CHANGE UP (ref 1.8–7.4)
NEUTROPHILS NFR BLD AUTO: 67 % — SIGNIFICANT CHANGE UP (ref 43–77)
NRBC # FLD: 0 K/UL — SIGNIFICANT CHANGE UP (ref 0–0)
PLATELET # BLD AUTO: 297 K/UL — SIGNIFICANT CHANGE UP (ref 150–400)
PMV BLD: 10.6 FL — SIGNIFICANT CHANGE UP (ref 7–13)
POTASSIUM SERPL-MCNC: 3.8 MMOL/L — SIGNIFICANT CHANGE UP (ref 3.5–5.3)
POTASSIUM SERPL-SCNC: 3.8 MMOL/L — SIGNIFICANT CHANGE UP (ref 3.5–5.3)
PROT SERPL-MCNC: 7.3 G/DL — SIGNIFICANT CHANGE UP (ref 6–8.3)
RBC # BLD: 4.81 M/UL — SIGNIFICANT CHANGE UP (ref 4.2–5.8)
RBC # FLD: 12.7 % — SIGNIFICANT CHANGE UP (ref 10.3–14.5)
SODIUM SERPL-SCNC: 137 MMOL/L — SIGNIFICANT CHANGE UP (ref 135–145)
WBC # BLD: 6.78 K/UL — SIGNIFICANT CHANGE UP (ref 3.8–10.5)
WBC # FLD AUTO: 6.78 K/UL — SIGNIFICANT CHANGE UP (ref 3.8–10.5)

## 2019-05-14 PROCEDURE — 93010 ELECTROCARDIOGRAM REPORT: CPT

## 2019-05-14 PROCEDURE — 99238 HOSP IP/OBS DSCHRG MGMT 30/<: CPT

## 2019-05-14 RX ADMIN — Medication 100 MILLIGRAM(S): at 08:45

## 2019-05-14 RX ADMIN — Medication 1 TABLET(S): at 08:45

## 2019-05-14 RX ADMIN — ALBUTEROL 2 PUFF(S): 90 AEROSOL, METERED ORAL at 07:58

## 2019-05-14 RX ADMIN — CELECOXIB 200 MILLIGRAM(S): 200 CAPSULE ORAL at 08:45

## 2019-05-14 RX ADMIN — LISINOPRIL 5 MILLIGRAM(S): 2.5 TABLET ORAL at 08:45

## 2019-05-14 RX ADMIN — Medication 1 MILLIGRAM(S): at 08:45

## 2019-05-14 RX ADMIN — Medication 20 MILLIGRAM(S): at 08:45

## 2019-05-14 RX ADMIN — ZIPRASIDONE HYDROCHLORIDE 20 MILLIGRAM(S): 20 CAPSULE ORAL at 08:45

## 2019-12-30 NOTE — PROVIDER CONTACT NOTE (OTHER) - BACKGROUND
Chief Complaint:  Sinus problem, ear pain.    Subjective: Rosalinda is a pleasant 38 year old female who presents today for URI symptoms for the past 5-6 days including nasal congestion, rhinorrhea and nonproductive cough. She reports increased sinus pain and pressure over the left maxillary and frontal sinus areas the past few days. She states last night her left ear started hurting and was popping and pain was radiating into her left jaw area. She reports low grade fevers and chills. She states she's tried Mucinex and [employer] which normally helps but states it didn't do anything and she's actually feeling worse. She reports nasal congestion is mostly clear-light yellow. She denies any chest symptoms, no wheezing, difficulty breathing, SOB or CP. No rashes. No vomiting or diarrhea. She is eating and drinking fluids ok. No known sick contacts. She does smoke cigarettes. No other concerns today.    Past Medical History:   Diagnosis Date   • Depression    • Dysplasia of cervix, low grade (GIGI 1)    • Gastroesophageal reflux disease    • Miscarriage    • Papillary thyroid carcinoma (CMS/HCC) 2015   • Thyroid condition 2015    Thyroid nodules   • Varicose vein      ALLERGIES:  No Known Allergies    MEDICATIONS:  Reviewed in Epic.    Social History     Tobacco Use   • Smoking status: Current Some Day Smoker     Packs/day: 0.20     Years: 15.00     Pack years: 3.00     Types: Cigarettes     Last attempt to quit: 2015     Years since quittin.8   • Smokeless tobacco: Never Used   Substance Use Topics   • Alcohol use: Yes     Alcohol/week: 0.0 standard drinks     Comment: occasional   • Drug use: No      OBJECTIVE:   Visit Vitals  /80   Pulse 111   Temp 99.3 °F (37.4 °C) (Temporal)   Ht 5' 7\" (1.702 m)   LMP 2019   SpO2 97%   BMI 37.95 kg/m²     Physical Exam   Constitutional: She is oriented to person, place, and time and well-developed, well-nourished, and in no distress. Vital signs 
are normal.  Non-toxic appearance. No distress.   HENT:   Head: Normocephalic and atraumatic.   Right Ear: Tympanic membrane, external ear and ear canal normal. No tenderness. Tympanic membrane is not perforated, not erythematous and not bulging. No middle ear effusion.   Left Ear: External ear and ear canal normal. No tenderness. Tympanic membrane is erythematous and bulging (mild). Tympanic membrane is not perforated.  No middle ear effusion.   Nose: Rhinorrhea and sinus tenderness (mild pain to palpation over L maxillary and frontal areas) present. No mucosal edema.   Mouth/Throat: Oropharynx is clear and moist and mucous membranes are normal. No oropharyngeal exudate.   Eyes: Pupils are equal, round, and reactive to light. Conjunctivae are normal. Right eye exhibits no discharge. Left eye exhibits no discharge.   Neck: Normal range of motion.   Cardiovascular: Normal rate, regular rhythm and normal heart sounds. Exam reveals no gallop and no friction rub.   No murmur heard.  Pulmonary/Chest: Effort normal and breath sounds normal. No respiratory distress. She has no wheezes. She has no rales.   Lymphadenopathy:     She has no cervical adenopathy.   Neurological: She is alert and oriented to person, place, and time.   Skin: Skin is warm and dry. No rash noted. She is not diaphoretic.   Psychiatric: Mood, affect and judgment normal.   Nursing note and vitals reviewed.    Assessment/Plan:  1. Left Acute Otitis Media / probable early acute sinusitis.    Discussed with patient will treat today with Amoxicillin 875 mg po BID for 10 days. For pain I recommend Ibuprofen/Tylenol every 8 hours as needed. She can take Sudafed as needed for ear/head pressure, use a humidifier, Mucinex for cough, drink plenty of fluids and rest. Written education provided on when to come back and patient felt comfortable with this above plan. She will follow up as needed for any new or worsening symptoms or if symptoms fail to resolve as 
Patient has history of hypertension, CIWA patient. Patient is restless, unable to stay still when measuring vital signs.
anticipated.    Patient verbalized understanding, agreement, and satisfaction with this plan.    NATALIYA Anderson  Collaborating physician Dr. Mateusz Henderson  
PA Hope contacted to renew restraint order
PA notified to renew restraint order
Pt admitted for ETOH withdrawal
SWAPNA Zaidi notified to renew restraint order
pt on CIWA protocol
s/p suicide attempt with pill ingestion. + ETOH . Hx DM  htn ALEJANDRO,
Pt CIWA score has increased to 10  Pt has been refusing BIPAP at night  Pt has been refusing use of pneumatic compressions

## 2020-04-08 ENCOUNTER — EMERGENCY (EMERGENCY)
Facility: HOSPITAL | Age: 55
LOS: 0 days | Discharge: HOME | End: 2020-04-08
Attending: EMERGENCY MEDICINE
Payer: MEDICAID

## 2020-04-08 VITALS
DIASTOLIC BLOOD PRESSURE: 100 MMHG | WEIGHT: 210.1 LBS | RESPIRATION RATE: 17 BRPM | HEART RATE: 67 BPM | OXYGEN SATURATION: 100 % | HEIGHT: 72 IN | TEMPERATURE: 98 F | SYSTOLIC BLOOD PRESSURE: 160 MMHG

## 2020-04-08 DIAGNOSIS — Z90.49 ACQUIRED ABSENCE OF OTHER SPECIFIED PARTS OF DIGESTIVE TRACT: Chronic | ICD-10-CM

## 2020-04-08 DIAGNOSIS — E11.9 TYPE 2 DIABETES MELLITUS WITHOUT COMPLICATIONS: ICD-10-CM

## 2020-04-08 DIAGNOSIS — F29 UNSPECIFIED PSYCHOSIS NOT DUE TO A SUBSTANCE OR KNOWN PHYSIOLOGICAL CONDITION: ICD-10-CM

## 2020-04-08 DIAGNOSIS — I10 ESSENTIAL (PRIMARY) HYPERTENSION: ICD-10-CM

## 2020-04-08 DIAGNOSIS — L08.9 LOCAL INFECTION OF THE SKIN AND SUBCUTANEOUS TISSUE, UNSPECIFIED: ICD-10-CM

## 2020-04-08 DIAGNOSIS — F25.9 SCHIZOAFFECTIVE DISORDER, UNSPECIFIED: ICD-10-CM

## 2020-04-08 PROCEDURE — 99284 EMERGENCY DEPT VISIT MOD MDM: CPT

## 2020-04-08 NOTE — ED ADULT NURSE NOTE - NS_BH TRG QUESTION4_ED_ALL_ED
No Arava Counseling:  Patient counseled regarding adverse effects of Arava including but not limited to nausea, vomiting, abnormalities in liver function tests. Patients may develop mouth sores, rash, diarrhea, and abnormalities in blood counts. The patient understands that monitoring is required including LFTs and blood counts.  There is a rare possibility of scarring of the liver and lung problems that can occur when taking methotrexate. Persistent nausea, loss of appetite, pale stools, dark urine, cough, and shortness of breath should be reported immediately. Patient advised to discontinue Arava treatment and consult with a physician prior to attempting conception. The patient will have to undergo a treatment to eliminate Arava from the body prior to conception.

## 2020-04-08 NOTE — ED PROVIDER NOTE - PHYSICAL EXAMINATION
Gen: Alert, NAD  Head: NC, AT   Eyes: PERRL, EOMI, normal lids/conjunctiva  ENT: normal hearing, patent oropharynx without erythema/exudate, uvula midline  Neck: supple, no tenderness, Trachea midline  Pulm: Bilateral BS, normal resp effort, no wheeze/stridor/retractions  CV: RRR, no M/R/G, 2+ radial and dp pulses bl, no edema  Abd: soft, NT/ND, +BS, no hepatosplenomegaly  Mskel: extremities x4 with normal ROM and no joint effusions. no ctl spine ttp.   Skin: left big toe with healing wound just proximal to nail bed  Neuro: AAOx3, no sensory/motor deficits, CN 2-12 intact  Psych: normal thought content, clear thought process. linear. speech is fluent. no evidence of hallucinations.
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
## 2020-04-08 NOTE — ED ADULT NURSE NOTE - OBJECTIVE STATEMENT
pt a&o x3, hx of schizoaffective c.o of  toe pain beginning 2 days ago after abscess drained from the left big toe. Pt biba expressing thoughts of hurting his roomate after having anxiety . pt calm  cooperative at this t time, denies si / hi thoughts now and states feels more calm.

## 2020-04-08 NOTE — ED PROVIDER NOTE - CARE PROVIDER_API CALL
Javier Briones (DPM)  Podiatric Medicine and Surgery  [address] [place] [address]  Phone: [phone]  Fax: [phone]  Follow Up Time:

## 2020-04-08 NOTE — ED ADULT NURSE NOTE - NSIMPLEMENTINTERV_GEN_ALL_ED
Implemented All Universal Safety Interventions:  [place] to call system. Call bell, personal items and telephone within reach. Instruct patient to call for assistance. Room bathroom lighting operational. Non-slip footwear when patient is off stretcher. Physically safe environment: no spills, clutter or unnecessary equipment. Stretcher in lowest position, wheels locked, appropriate side rails in place.

## 2020-06-01 ENCOUNTER — EMERGENCY (EMERGENCY)
Facility: HOSPITAL | Age: 55
LOS: 1 days | Discharge: HOME | End: 2020-06-01
Attending: EMERGENCY MEDICINE
Payer: COMMERCIAL

## 2020-06-01 VITALS
OXYGEN SATURATION: 98 % | SYSTOLIC BLOOD PRESSURE: 158 MMHG | RESPIRATION RATE: 18 BRPM | TEMPERATURE: 98 F | DIASTOLIC BLOOD PRESSURE: 78 MMHG | HEART RATE: 82 BPM

## 2020-06-01 DIAGNOSIS — Z90.49 ACQUIRED ABSENCE OF OTHER SPECIFIED PARTS OF DIGESTIVE TRACT: Chronic | ICD-10-CM

## 2020-06-01 PROCEDURE — 73630 X-RAY EXAM OF FOOT: CPT | Mod: 26,LT

## 2020-06-01 PROCEDURE — 99283 EMERGENCY DEPT VISIT LOW MDM: CPT

## 2020-06-01 PROCEDURE — 99283 EMERGENCY DEPT VISIT LOW MDM: CPT | Mod: 25

## 2020-06-01 PROCEDURE — 73630 X-RAY EXAM OF FOOT: CPT

## 2020-06-01 NOTE — ED PROVIDER NOTE - PMH
COPD with asthma    Diabetes  Type 2; pt lost weight, no longer taking meds  Hypertension, unspecified type    Obstructive sleep apnea  CPAP for 7-8 years  Paranoia    Schizo affective schizophrenia    Schizoaffective disorder, bipolar type    Uncomplicated asthma, unspecified asthma severity

## 2020-06-01 NOTE — ED PROVIDER NOTE - OBJECTIVE STATEMENT
54 year old male with PMHx of COPD with asthma, diabetes, hypertension, obstructive sleep apnea, paranoia, and Schizo affective disorder and PSHx of S/P appendectomy presents to the ED with complaints of left foot pain which appears to be chronic. Patient denies any fevers, chills, nausea, vomiting, and all other acute complaints. Patient states that he is able to bear weight without difficulty. Patient reports that he is currently on multiple medications. NKDA.

## 2020-06-01 NOTE — ED PROVIDER NOTE - MUSCULOSKELETAL, MLM
Dorsalis pedis and tibial pulses palpable. Fungal nails noted. Small superficial uninfected ulcer noted over the left second toe. No cellulitis. Arches are flat.

## 2020-06-01 NOTE — ED PROVIDER NOTE - PATIENT PORTAL LINK FT
You can access the FollowMyHealth Patient Portal offered by [facility] by registering at the following website: [email or web address]. By joining [employer]’s FollowMyHealth portal, you will also be able to view your health information using other applications (apps) compatible with our system.

## 2020-06-01 NOTE — ED ADULT NURSE NOTE - OBJECTIVE STATEMENT
pt is here for foot pain.  pt stated that chronic pain to both feet, pain 5-7/10, denied edema or redness, no distress noted at this time.

## 2020-06-04 ENCOUNTER — INPATIENT (INPATIENT)
Facility: HOSPITAL | Age: 55
LOS: 10 days | Discharge: HOME | End: 2020-06-15
Attending: PSYCHIATRY & NEUROLOGY | Admitting: PSYCHIATRY & NEUROLOGY
Payer: MEDICAID

## 2020-06-04 VITALS
DIASTOLIC BLOOD PRESSURE: 79 MMHG | RESPIRATION RATE: 18 BRPM | HEART RATE: 71 BPM | OXYGEN SATURATION: 100 % | SYSTOLIC BLOOD PRESSURE: 124 MMHG

## 2020-06-04 DIAGNOSIS — Z90.49 ACQUIRED ABSENCE OF OTHER SPECIFIED PARTS OF DIGESTIVE TRACT: Chronic | ICD-10-CM

## 2020-06-04 DIAGNOSIS — F25.9 SCHIZOAFFECTIVE DISORDER, UNSPECIFIED: ICD-10-CM

## 2020-06-04 LAB
ALBUMIN SERPL ELPH-MCNC: 4.4 G/DL — SIGNIFICANT CHANGE UP (ref 3.3–5)
ALP SERPL-CCNC: 110 U/L — SIGNIFICANT CHANGE UP (ref 40–120)
ALT FLD-CCNC: 53 U/L — HIGH (ref 4–41)
ANION GAP SERPL CALC-SCNC: 15 MMO/L — HIGH (ref 7–14)
APAP SERPL-MCNC: < 15 UG/ML — LOW (ref 15–25)
AST SERPL-CCNC: 44 U/L — HIGH (ref 4–40)
BASOPHILS # BLD AUTO: 0.04 K/UL — SIGNIFICANT CHANGE UP (ref 0–0.2)
BASOPHILS NFR BLD AUTO: 0.6 % — SIGNIFICANT CHANGE UP (ref 0–2)
BILIRUB SERPL-MCNC: 0.6 MG/DL — SIGNIFICANT CHANGE UP (ref 0.2–1.2)
BUN SERPL-MCNC: 22 MG/DL — SIGNIFICANT CHANGE UP (ref 7–23)
CALCIUM SERPL-MCNC: 10.6 MG/DL — HIGH (ref 8.4–10.5)
CHLORIDE SERPL-SCNC: 104 MMOL/L — SIGNIFICANT CHANGE UP (ref 98–107)
CO2 SERPL-SCNC: 19 MMOL/L — LOW (ref 22–31)
CREAT SERPL-MCNC: 0.91 MG/DL — SIGNIFICANT CHANGE UP (ref 0.5–1.3)
EOSINOPHIL # BLD AUTO: 0.27 K/UL — SIGNIFICANT CHANGE UP (ref 0–0.5)
EOSINOPHIL NFR BLD AUTO: 3.8 % — SIGNIFICANT CHANGE UP (ref 0–6)
ETHANOL BLD-MCNC: < 10 MG/DL — SIGNIFICANT CHANGE UP
GLUCOSE SERPL-MCNC: 87 MG/DL — SIGNIFICANT CHANGE UP (ref 70–99)
HCT VFR BLD CALC: 42 % — SIGNIFICANT CHANGE UP (ref 39–50)
HGB BLD-MCNC: 13.8 G/DL — SIGNIFICANT CHANGE UP (ref 13–17)
IMM GRANULOCYTES NFR BLD AUTO: 0.1 % — SIGNIFICANT CHANGE UP (ref 0–1.5)
LYMPHOCYTES # BLD AUTO: 1.68 K/UL — SIGNIFICANT CHANGE UP (ref 1–3.3)
LYMPHOCYTES # BLD AUTO: 23.5 % — SIGNIFICANT CHANGE UP (ref 13–44)
MCHC RBC-ENTMCNC: 29.6 PG — SIGNIFICANT CHANGE UP (ref 27–34)
MCHC RBC-ENTMCNC: 32.9 % — SIGNIFICANT CHANGE UP (ref 32–36)
MCV RBC AUTO: 90.1 FL — SIGNIFICANT CHANGE UP (ref 80–100)
MONOCYTES # BLD AUTO: 0.55 K/UL — SIGNIFICANT CHANGE UP (ref 0–0.9)
MONOCYTES NFR BLD AUTO: 7.7 % — SIGNIFICANT CHANGE UP (ref 2–14)
NEUTROPHILS # BLD AUTO: 4.61 K/UL — SIGNIFICANT CHANGE UP (ref 1.8–7.4)
NEUTROPHILS NFR BLD AUTO: 64.3 % — SIGNIFICANT CHANGE UP (ref 43–77)
NRBC # FLD: 0 K/UL — SIGNIFICANT CHANGE UP (ref 0–0)
PLATELET # BLD AUTO: 189 K/UL — SIGNIFICANT CHANGE UP (ref 150–400)
PMV BLD: 10.6 FL — SIGNIFICANT CHANGE UP (ref 7–13)
POTASSIUM SERPL-MCNC: 3.7 MMOL/L — SIGNIFICANT CHANGE UP (ref 3.5–5.3)
POTASSIUM SERPL-SCNC: 3.7 MMOL/L — SIGNIFICANT CHANGE UP (ref 3.5–5.3)
PROT SERPL-MCNC: 7 G/DL — SIGNIFICANT CHANGE UP (ref 6–8.3)
RBC # BLD: 4.66 M/UL — SIGNIFICANT CHANGE UP (ref 4.2–5.8)
RBC # FLD: 12.8 % — SIGNIFICANT CHANGE UP (ref 10.3–14.5)
SALICYLATES SERPL-MCNC: < 5 MG/DL — LOW (ref 15–30)
SARS-COV-2 RNA SPEC QL NAA+PROBE: SIGNIFICANT CHANGE UP
SODIUM SERPL-SCNC: 138 MMOL/L — SIGNIFICANT CHANGE UP (ref 135–145)
TSH SERPL-MCNC: 1.44 UIU/ML — SIGNIFICANT CHANGE UP (ref 0.27–4.2)
WBC # BLD: 7.16 K/UL — SIGNIFICANT CHANGE UP (ref 3.8–10.5)
WBC # FLD AUTO: 7.16 K/UL — SIGNIFICANT CHANGE UP (ref 3.8–10.5)

## 2020-06-04 PROCEDURE — 99285 EMERGENCY DEPT VISIT HI MDM: CPT

## 2020-06-04 RX ORDER — TETANUS TOXOID, REDUCED DIPHTHERIA TOXOID AND ACELLULAR PERTUSSIS VACCINE, ADSORBED 5; 2.5; 8; 8; 2.5 [IU]/.5ML; [IU]/.5ML; UG/.5ML; UG/.5ML; UG/.5ML
0.5 SUSPENSION INTRAMUSCULAR ONCE
Refills: 0 | Status: COMPLETED | OUTPATIENT
Start: 2020-06-04 | End: 2020-06-04

## 2020-06-04 RX ORDER — HALOPERIDOL DECANOATE 100 MG/ML
5 INJECTION INTRAMUSCULAR ONCE
Refills: 0 | Status: COMPLETED | OUTPATIENT
Start: 2020-06-04 | End: 2020-06-04

## 2020-06-04 RX ADMIN — Medication 2 MILLIGRAM(S): at 12:55

## 2020-06-04 RX ADMIN — Medication 2 MILLIGRAM(S): at 23:33

## 2020-06-04 RX ADMIN — HALOPERIDOL DECANOATE 5 MILLIGRAM(S): 100 INJECTION INTRAMUSCULAR at 23:33

## 2020-06-04 RX ADMIN — TETANUS TOXOID, REDUCED DIPHTHERIA TOXOID AND ACELLULAR PERTUSSIS VACCINE, ADSORBED 0.5 MILLILITER(S): 5; 2.5; 8; 8; 2.5 SUSPENSION INTRAMUSCULAR at 16:22

## 2020-06-04 NOTE — ED PROVIDER NOTE - CLINICAL SUMMARY MEDICAL DECISION MAKING FREE TEXT BOX
Pt is a 53 y/o male with lacerations to the left wrist, unknown mechanism or rationale. Will allow pt to calm down from agitation and reassess motivation for lacerations. +/- psych consult. Will also cover pt for tetanus. Pt is a 53 y/o male with lacerations to the left wrist, unknown mechanism or rationale. Will medicate patient and allow pt to calm down from agitation and reassess motivation for lacerations. Will do labs, +/- psych consult. Will also cover pt for tetanus.

## 2020-06-04 NOTE — ED ADULT NURSE REASSESSMENT NOTE - GENERAL PATIENT STATE
received pt and report at 2215 from Moe ZURITA, pt observed laying in bed, pt appears drowsy, unable to keep his eyes open. NAD, even unlabored resp observed.
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
## 2020-06-04 NOTE — ED BEHAVIORAL HEALTH NOTE - BEHAVIORAL HEALTH NOTE
Writer called [facility]  [facility]; Writer called [facility]  [facility] patient is not a resident at either residence.

## 2020-06-04 NOTE — ED BEHAVIORAL HEALTH ASSESSMENT NOTE - DESCRIPTION
During course of ED visit patient was calm.  Patient was not aggressive or violent and did not require PRN medications.  Vital Signs Last 24 Hrs  T(C): --  T(F): --  HR: 71 (04 Jun 2020 12:20) (71 - 71)  BP: 124/79 (04 Jun 2020 12:20) (124/79 - 124/79)  BP(mean): --  RR: 18 (04 Jun 2020 12:20) (18 - 18)  SpO2: 100% (04 Jun 2020 12:20) (100% - 100%) Per chart: HTN, Asthma, DM2, sleep apnea, COPD per chart history: Patient lives with 3 roommates via [employer];
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
## 2020-06-04 NOTE — ED ADULT TRIAGE NOTE - CHIEF COMPLAINT QUOTE
Arrives from the street as per bystander pt cut his wrist, left wrist wrapped upon arrival. Noted to be slurring speech, denies alcohol or drug use, denies SI/HI in triage. As per  NP, pt can go to [facility]

## 2020-06-04 NOTE — ED BEHAVIORAL HEALTH ASSESSMENT NOTE - OTHER PAST PSYCHIATRIC HISTORY (INCLUDE DETAILS REGARDING ONSET, COURSE OF ILLNESS, INPATIENT/OUTPATIENT TREATMENT)
History of Schizoaffective, three inpatient hospitalizations at [facility] and [facility] - last admission was May, 2019 for 11 days for suicidal ideation and medication non-compliance.  one prior suicide attempt by drinking bleach (medical admission), Possible suicide attempt today, cut l. wrist, unclear due to disorganization.  Attends [employer] in [place] [facility]. History of Schizoaffective, three inpatient hospitalizations at [facility] - last admission was May, 2019 for 11 days for suicidal ideation and medication non-compliance.  one prior suicide attempt by drinking bleach (medical admission), Possible suicide attempt today, cut l. wrist, unclear due to disorganization.  Attends [employer] program

## 2020-06-04 NOTE — ED PROVIDER NOTE - OBJECTIVE STATEMENT
Pt is a 53 y/o male with PMHx of COPD, hypertension, asthma, diabetes, schizophrenia, paranoia, and obstructive sleep apnea. Pt PSHx includes an appendectomy. Pt presents to the ER for agitation and a linear laceration to the left wrist. Pt states that he does not want to be here. Previously d/c from [facility] today. Refuses to state mechanism for left wrist laceration. Denies SI, HI, AH, VH. Denies drug or alcohol use, or other physical complaints or concerns.
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
## 2020-06-04 NOTE — ED BEHAVIORAL HEALTH ASSESSMENT NOTE - SUICIDE RISK FACTORS
Psychotic disorder current/past/Alcohol/Substance abuse disorders/Unable to engage in safety planning/History of abuse/trauma

## 2020-06-04 NOTE — ED PROVIDER NOTE - SKIN WOUND TYPE
3 linear lacerations to ventral surface of left wrist. Longest laceration measures 6cm in length. All lacerations superficial in nature./LACERATION(S) 3 linear lacerations to ventral surface of left wrist. Longest laceration measures 6cm in length. All lacerations superficial in nature.  NO FB/LACERATION(S)

## 2020-06-04 NOTE — ED BEHAVIORAL HEALTH ASSESSMENT NOTE - RISK ASSESSMENT
Risk factors chronic- history of legal issues, history of aggression/violence, history of substance use, history of inpatient admissions, history of 1 past suicide attempt in 1991.   acute: cut l. wrist earlier today, acute psychotic symptomatology present; medication non-compliance; unable to engage in safety planning Moderate Acute Suicide Risk
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
## 2020-06-04 NOTE — ED BEHAVIORAL HEALTH NOTE - BEHAVIORAL HEALTH NOTE
Worker called [facility] and obtained bed on 1N for patient admission.

## 2020-06-04 NOTE — ED BEHAVIORAL HEALTH ASSESSMENT NOTE - PAST PSYCHOTROPIC MEDICATION
per chart history:  Geodon 20mg daily, Geodon 80mg BID, Prozac 60mg Daily, Cogentin 1mg Daily, Depakote 1000mg QHS, Lisinopril 5mg Daily, Incruse Ellipta 62.5mg 1 puff QAM

## 2020-06-05 ENCOUNTER — EMERGENCY (EMERGENCY)
Facility: HOSPITAL | Age: 55
LOS: 1 days | Discharge: HOME | End: 2020-06-05
Attending: STUDENT IN AN ORGANIZED HEALTH CARE EDUCATION/TRAINING PROGRAM | Admitting: STUDENT IN AN ORGANIZED HEALTH CARE EDUCATION/TRAINING PROGRAM
Payer: MEDICAID

## 2020-06-05 VITALS
RESPIRATION RATE: 16 BRPM | OXYGEN SATURATION: 97 % | DIASTOLIC BLOOD PRESSURE: 80 MMHG | SYSTOLIC BLOOD PRESSURE: 129 MMHG | TEMPERATURE: 97 F | HEIGHT: 72 IN | HEART RATE: 57 BPM

## 2020-06-05 DIAGNOSIS — Z90.49 ACQUIRED ABSENCE OF OTHER SPECIFIED PARTS OF DIGESTIVE TRACT: Chronic | ICD-10-CM

## 2020-06-05 LAB
AMPHET UR-MCNC: NEGATIVE — SIGNIFICANT CHANGE UP
APPEARANCE UR: CLEAR — SIGNIFICANT CHANGE UP
BARBITURATES UR SCN-MCNC: NEGATIVE — SIGNIFICANT CHANGE UP
BENZODIAZ UR-MCNC: NEGATIVE — SIGNIFICANT CHANGE UP
BILIRUB UR-MCNC: NEGATIVE — SIGNIFICANT CHANGE UP
BLOOD UR QL VISUAL: NEGATIVE — SIGNIFICANT CHANGE UP
CANNABINOIDS UR-MCNC: POSITIVE — SIGNIFICANT CHANGE UP
COCAINE METAB.OTHER UR-MCNC: NEGATIVE — SIGNIFICANT CHANGE UP
COLOR SPEC: YELLOW — SIGNIFICANT CHANGE UP
GLUCOSE UR-MCNC: NEGATIVE — SIGNIFICANT CHANGE UP
KETONES UR-MCNC: NEGATIVE — SIGNIFICANT CHANGE UP
LEUKOCYTE ESTERASE UR-ACNC: NEGATIVE — SIGNIFICANT CHANGE UP
METHADONE UR-MCNC: NEGATIVE — SIGNIFICANT CHANGE UP
NITRITE UR-MCNC: NEGATIVE — SIGNIFICANT CHANGE UP
OPIATES UR-MCNC: NEGATIVE — SIGNIFICANT CHANGE UP
OXYCODONE UR-MCNC: NEGATIVE — SIGNIFICANT CHANGE UP
PCP UR-MCNC: NEGATIVE — SIGNIFICANT CHANGE UP
PH UR: 7 — SIGNIFICANT CHANGE UP (ref 5–8)
PROT UR-MCNC: NEGATIVE — SIGNIFICANT CHANGE UP
SP GR SPEC: 1.02 — SIGNIFICANT CHANGE UP (ref 1–1.04)
UROBILINOGEN FLD QL: HIGH

## 2020-06-05 PROCEDURE — 99284 EMERGENCY DEPT VISIT MOD MDM: CPT | Mod: 25,GC

## 2020-06-05 PROCEDURE — 93010 ELECTROCARDIOGRAM REPORT: CPT

## 2020-06-05 RX ORDER — LISINOPRIL 2.5 MG/1
5 TABLET ORAL DAILY
Refills: 0 | Status: DISCONTINUED | OUTPATIENT
Start: 2020-06-05 | End: 2020-06-15

## 2020-06-05 RX ORDER — FLUOXETINE HCL 10 MG
20 CAPSULE ORAL DAILY
Refills: 0 | Status: DISCONTINUED | OUTPATIENT
Start: 2020-06-05 | End: 2020-06-11

## 2020-06-05 RX ORDER — GABAPENTIN 400 MG/1
600 CAPSULE ORAL THREE TIMES A DAY
Refills: 0 | Status: DISCONTINUED | OUTPATIENT
Start: 2020-06-05 | End: 2020-06-08

## 2020-06-05 RX ORDER — NICOTINE POLACRILEX 2 MG
4 GUM BUCCAL
Refills: 0 | Status: DISCONTINUED | OUTPATIENT
Start: 2020-06-05 | End: 2020-06-15

## 2020-06-05 RX ORDER — CELECOXIB 200 MG/1
200 CAPSULE ORAL DAILY
Refills: 0 | Status: DISCONTINUED | OUTPATIENT
Start: 2020-06-05 | End: 2020-06-15

## 2020-06-05 RX ORDER — ZIPRASIDONE HYDROCHLORIDE 20 MG/1
10 CAPSULE ORAL ONCE
Refills: 0 | Status: DISCONTINUED | OUTPATIENT
Start: 2020-06-05 | End: 2020-06-12

## 2020-06-05 RX ORDER — OLANZAPINE 15 MG/1
10 TABLET, FILM COATED ORAL EVERY 6 HOURS
Refills: 0 | Status: DISCONTINUED | OUTPATIENT
Start: 2020-06-05 | End: 2020-06-10

## 2020-06-05 RX ORDER — ZIPRASIDONE HYDROCHLORIDE 20 MG/1
10 CAPSULE ORAL ONCE
Refills: 0 | Status: COMPLETED | OUTPATIENT
Start: 2020-06-05 | End: 2020-06-05

## 2020-06-05 RX ORDER — DIPHENHYDRAMINE HCL 50 MG
50 CAPSULE ORAL EVERY 6 HOURS
Refills: 0 | Status: DISCONTINUED | OUTPATIENT
Start: 2020-06-05 | End: 2020-06-15

## 2020-06-05 RX ORDER — ZIPRASIDONE HYDROCHLORIDE 20 MG/1
20 CAPSULE ORAL
Refills: 0 | Status: DISCONTINUED | OUTPATIENT
Start: 2020-06-05 | End: 2020-06-07

## 2020-06-05 RX ORDER — GABAPENTIN 400 MG/1
600 CAPSULE ORAL ONCE
Refills: 0 | Status: COMPLETED | OUTPATIENT
Start: 2020-06-05 | End: 2020-06-05

## 2020-06-05 RX ORDER — DIPHENHYDRAMINE HCL 50 MG
50 CAPSULE ORAL ONCE
Refills: 0 | Status: DISCONTINUED | OUTPATIENT
Start: 2020-06-05 | End: 2020-06-05

## 2020-06-05 RX ORDER — OLANZAPINE 15 MG/1
5 TABLET, FILM COATED ORAL EVERY 6 HOURS
Refills: 0 | Status: DISCONTINUED | OUTPATIENT
Start: 2020-06-05 | End: 2020-06-10

## 2020-06-05 RX ORDER — OXYCODONE AND ACETAMINOPHEN 5; 325 MG/1; MG/1
1 TABLET ORAL EVERY 8 HOURS
Refills: 0 | Status: DISCONTINUED | OUTPATIENT
Start: 2020-06-05 | End: 2020-06-09

## 2020-06-05 RX ORDER — TRAZODONE HCL 50 MG
100 TABLET ORAL AT BEDTIME
Refills: 0 | Status: DISCONTINUED | OUTPATIENT
Start: 2020-06-05 | End: 2020-06-15

## 2020-06-05 RX ORDER — ALBUTEROL 90 UG/1
2 AEROSOL, METERED ORAL EVERY 6 HOURS
Refills: 0 | Status: DISCONTINUED | OUTPATIENT
Start: 2020-06-05 | End: 2020-06-15

## 2020-06-05 RX ORDER — NICOTINE POLACRILEX 2 MG
1 GUM BUCCAL DAILY
Refills: 0 | Status: DISCONTINUED | OUTPATIENT
Start: 2020-06-05 | End: 2020-06-15

## 2020-06-05 RX ADMIN — Medication 2 MILLIGRAM(S): at 20:20

## 2020-06-05 RX ADMIN — GABAPENTIN 600 MILLIGRAM(S): 400 CAPSULE ORAL at 14:21

## 2020-06-05 RX ADMIN — Medication 1 PATCH: at 12:53

## 2020-06-05 RX ADMIN — OLANZAPINE 5 MILLIGRAM(S): 15 TABLET, FILM COATED ORAL at 12:50

## 2020-06-05 RX ADMIN — Medication 100 MILLIGRAM(S): at 20:28

## 2020-06-05 RX ADMIN — ALBUTEROL 2 PUFF(S): 90 AEROSOL, METERED ORAL at 20:35

## 2020-06-05 RX ADMIN — GABAPENTIN 600 MILLIGRAM(S): 400 CAPSULE ORAL at 20:28

## 2020-06-05 RX ADMIN — ZIPRASIDONE HYDROCHLORIDE 10 MILLIGRAM(S): 20 CAPSULE ORAL at 10:30

## 2020-06-05 RX ADMIN — Medication 50 MILLIGRAM(S): at 09:30

## 2020-06-05 RX ADMIN — Medication 50 MILLIGRAM(S): at 20:20

## 2020-06-05 RX ADMIN — OLANZAPINE 10 MILLIGRAM(S): 15 TABLET, FILM COATED ORAL at 09:30

## 2020-06-05 RX ADMIN — OXYCODONE AND ACETAMINOPHEN 1 TABLET(S): 5; 325 TABLET ORAL at 21:35

## 2020-06-05 RX ADMIN — Medication 1 PATCH: at 19:51

## 2020-06-05 RX ADMIN — OXYCODONE AND ACETAMINOPHEN 1 TABLET(S): 5; 325 TABLET ORAL at 14:21

## 2020-06-05 RX ADMIN — Medication 4 MILLIGRAM(S): at 12:52

## 2020-06-05 RX ADMIN — ZIPRASIDONE HYDROCHLORIDE 20 MILLIGRAM(S): 20 CAPSULE ORAL at 11:11
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
## 2020-06-05 NOTE — ED ADULT NURSE NOTE - NS ED NURSE DISCH DISPOSITION
Alert-The patient is alert, awake and responds to voice. The patient is oriented to time, place, and person. The triage nurse is able to obtain subjective information.
Discharged

## 2020-06-05 NOTE — ED ADULT TRIAGE NOTE - CHIEF COMPLAINT QUOTE
Pt w/ hx of schizoaffective disorder bipolar c/o generalized chest pain, Per EMS Pt saw doctor who diagnosed Pt with biliary colic.  EKG Pt from [facility] with 2 staff members w/ hx of schizoaffective disorder bipolar c/o generalized chest pain, Per EMS Pt saw doctor who diagnosed Pt with biliary colic.  EKG

## 2020-06-05 NOTE — ED ADULT NURSE NOTE - OBJECTIVE STATEMENT
pt is awake, eyes closed, responds to pain and some verbal questions, but does not respond always. Pt color is good, no nonverbal cues for pain, pt is breathing even and unlabored, placed on continuous cardiac monitoring. will continue to monitor.

## 2020-06-05 NOTE — ED ADULT NURSE NOTE - CHIEF COMPLAINT QUOTE
Pt from [facility] with 2 staff members w/ hx of schizoaffective disorder bipolar c/o generalized chest pain, Per EMS Pt saw doctor who diagnosed Pt with biliary colic.  EKG

## 2020-06-05 NOTE — CHART NOTE - NSCHARTNOTEFT_GEN_A_CORE
[facility] Inpatient to ED Transfer Summary    Reason for Transfer/Medical Summary: 54 year old male with PMHx COPD, ALEJANDRO, DM, HTN complaining of 10/10 RUQ pain which started in the morning and gradually worsened. Pt vomited x 1. Pt denies constipation.  Pt states he had gallbladder stones around 12 years ago with similar pain and nothing was done at that time. On abdominal examination, pt is guarded, TTP in RUQ, Nondistended, with Hyperactive bowel sounds. Pt to be transferred for further workup of abdominal pain, likely biliary cholic.     PAST MEDICAL & SURGICAL HISTORY:  Schizo affective schizophrenia  COPD with asthma  Obstructive sleep apnea: CPAP for 7-8 years  Schizoaffective disorder, bipolar type  Diabetes: Type 2; pt lost weight, no longer taking meds  Hypertension, unspecified type  Paranoia  Uncomplicated asthma, unspecified asthma severity  S/P appendectomy      Allergies    Thorazine (Unknown)    Intolerances    Haldol (Unknown)      MEDICATIONS  (STANDING):  celecoxib 200 milliGRAM(s) Oral daily  FLUoxetine 20 milliGRAM(s) Oral daily  gabapentin 600 milliGRAM(s) Oral three times a day  lisinopril 5 milliGRAM(s) Oral daily  oxycodone    5 mG/acetaminophen 325 mG 1 Tablet(s) Oral every 8 hours  traZODone 100 milliGRAM(s) Oral at bedtime  ziprasidone 20 milliGRAM(s) Oral <User Schedule>    MEDICATIONS  (PRN):  ALBUTerol    90 MICROgram(s) HFA Inhaler 2 Puff(s) Inhalation every 6 hours PRN copd  diphenhydrAMINE 50 milliGRAM(s) Oral every 6 hours PRN agitation  diphenhydrAMINE   Injectable 50 milliGRAM(s) IntraMuscular every 6 hours PRN severe agitation  LORazepam     Tablet 2 milliGRAM(s) Oral every 2 hours PRN CIWA score increase by 2 points and current CIWA score GREATER THAN 9  LORazepam     Tablet 2 milliGRAM(s) Oral every 6 hours PRN agitation  nicotine  Polacrilex Gum 4 milliGRAM(s) Oral every 3 hours PRN nicotine replacement  nicotine - 21 mG/24Hr(s) Patch 1 patch Transdermal daily PRN nicotine replacement  OLANZapine 5 milliGRAM(s) Oral every 6 hours PRN agitation  OLANZapine Injectable 10 milliGRAM(s) IntraMuscular every 6 hours PRN agitation  OLANZapine Injectable 10 milliGRAM(s) IntraMuscular every 6 hours PRN agitation  ziprasidone Injectable 10 milliGRAM(s) IntraMuscular once PRN agitation      Vital Signs Last 24 Hrs  T(C): 36.1 (2020 21:25), Max: 36.8 (2020 01:45)  T(F): 97 (2020 21:25), Max: 98.3 (2020 01:45)  HR: 111 (2020 16:45) (59 - 111)  BP: 132/106 (2020 16:45) (121/75 - 132/106)  BP(mean): --  RR: 16 (2020 16:45) (16 - 18)  SpO2: 98% (2020 00:00) (98% - 100%)  CAPILLARY BLOOD GLUCOSE            PHYSICAL EXAM:  GENERAL: NAD, well-developed  HEAD:  Atraumatic, Normocephalic  EYES: EOMI, PERRLA, conjunctiva and sclera clear  NECK: Supple, No JVD  CHEST/LUNG: Clear to auscultation bilaterally; No wheeze  HEART: Regular rate and rhythm; No murmurs, rubs, or gallops  ABDOMEN: Guarded, TTP in RUQ, Nondistended; Hyperactive bowel sounds present  EXTREMITIES:  2+ Peripheral Pulses, No clubbing, cyanosis, or edema  PSYCH: AAOx3  NEUROLOGY: non-focal  SKIN: No rashes or lesions    LABS:                        13.8   7.16  )-----------( 189      ( 2020 14:28 )             42.0     06-04    138  |  104  |  22  ----------------------------<  87  3.7   |  19<L>  |  0.91    Ca    10.6<H>      2020 13:08    TPro  7.0  /  Alb  4.4  /  TBili  0.6  /  DBili  x   /  AST  44<H>  /  ALT  53<H>  /  AlkPhos  110  06-04          Urinalysis Basic - ( 2020 14:15 )    Color: YELLOW / Appearance: CLEAR / S.022 / pH: 7.0  Gluc: NEGATIVE / Ketone: NEGATIVE  / Bili: NEGATIVE / Urobili: MODERATE   Blood: NEGATIVE / Protein: NEGATIVE / Nitrite: NEGATIVE   Leuk Esterase: NEGATIVE / RBC: x / WBC x   Sq Epi: x / Non Sq Epi: x / Bacteria: x        Psychiatry Section:  Psychiatric Summary/ZHH admitting diagnosis:    Psychiatric Recommendations:    Observation status (check one):   (x) Constant Observation  ( ) Enhanced care  ( ) Routine checks    Risk Status (check all that apply if present):  ( ) at risk for suicide/self-injury  ( ) at risk for aggressive behavior  (x) at risk for elopement  ( ) other risk:
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
## 2020-06-06 VITALS
DIASTOLIC BLOOD PRESSURE: 102 MMHG | TEMPERATURE: 98 F | OXYGEN SATURATION: 100 % | HEART RATE: 62 BPM | SYSTOLIC BLOOD PRESSURE: 140 MMHG | RESPIRATION RATE: 18 BRPM

## 2020-06-06 LAB
ALBUMIN SERPL ELPH-MCNC: 3.7 G/DL — SIGNIFICANT CHANGE UP (ref 3.3–5)
ALP SERPL-CCNC: 114 U/L — SIGNIFICANT CHANGE UP (ref 40–120)
ALT FLD-CCNC: 44 U/L — HIGH (ref 4–41)
ANION GAP SERPL CALC-SCNC: 11 MMO/L — SIGNIFICANT CHANGE UP (ref 7–14)
AST SERPL-CCNC: 50 U/L — HIGH (ref 4–40)
BASE EXCESS BLDV CALC-SCNC: 0.7 MMOL/L — SIGNIFICANT CHANGE UP
BASOPHILS # BLD AUTO: 0.05 K/UL — SIGNIFICANT CHANGE UP (ref 0–0.2)
BASOPHILS NFR BLD AUTO: 0.7 % — SIGNIFICANT CHANGE UP (ref 0–2)
BILIRUB SERPL-MCNC: 0.2 MG/DL — SIGNIFICANT CHANGE UP (ref 0.2–1.2)
BLOOD GAS VENOUS - CREATININE: 0.85 MG/DL — SIGNIFICANT CHANGE UP (ref 0.5–1.3)
BLOOD GAS VENOUS - FIO2: 21 — SIGNIFICANT CHANGE UP
BUN SERPL-MCNC: 24 MG/DL — HIGH (ref 7–23)
CALCIUM SERPL-MCNC: 9.9 MG/DL — SIGNIFICANT CHANGE UP (ref 8.4–10.5)
CHLORIDE BLDV-SCNC: 109 MMOL/L — HIGH (ref 96–108)
CHLORIDE SERPL-SCNC: 103 MMOL/L — SIGNIFICANT CHANGE UP (ref 98–107)
CO2 SERPL-SCNC: 22 MMOL/L — SIGNIFICANT CHANGE UP (ref 22–31)
CREAT SERPL-MCNC: 0.84 MG/DL — SIGNIFICANT CHANGE UP (ref 0.5–1.3)
EOSINOPHIL # BLD AUTO: 0.42 K/UL — SIGNIFICANT CHANGE UP (ref 0–0.5)
EOSINOPHIL NFR BLD AUTO: 5.7 % — SIGNIFICANT CHANGE UP (ref 0–6)
GAS PNL BLDV: 134 MMOL/L — LOW (ref 136–146)
GLUCOSE BLDV-MCNC: 100 MG/DL — HIGH (ref 70–99)
GLUCOSE SERPL-MCNC: 117 MG/DL — HIGH (ref 70–99)
HCO3 BLDV-SCNC: 24 MMOL/L — SIGNIFICANT CHANGE UP (ref 20–27)
HCT VFR BLD CALC: 38.4 % — LOW (ref 39–50)
HCT VFR BLDV CALC: 41.3 % — SIGNIFICANT CHANGE UP (ref 39–51)
HGB BLD-MCNC: 13 G/DL — SIGNIFICANT CHANGE UP (ref 13–17)
HGB BLDV-MCNC: 13.5 G/DL — SIGNIFICANT CHANGE UP (ref 13–17)
IMM GRANULOCYTES NFR BLD AUTO: 0.3 % — SIGNIFICANT CHANGE UP (ref 0–1.5)
LACTATE BLDV-MCNC: 1.4 MMOL/L — SIGNIFICANT CHANGE UP (ref 0.5–2)
LIDOCAIN IGE QN: 18.3 U/L — SIGNIFICANT CHANGE UP (ref 7–60)
LYMPHOCYTES # BLD AUTO: 1.78 K/UL — SIGNIFICANT CHANGE UP (ref 1–3.3)
LYMPHOCYTES # BLD AUTO: 24.1 % — SIGNIFICANT CHANGE UP (ref 13–44)
MCHC RBC-ENTMCNC: 31.2 PG — SIGNIFICANT CHANGE UP (ref 27–34)
MCHC RBC-ENTMCNC: 33.9 % — SIGNIFICANT CHANGE UP (ref 32–36)
MCV RBC AUTO: 92.1 FL — SIGNIFICANT CHANGE UP (ref 80–100)
MONOCYTES # BLD AUTO: 0.64 K/UL — SIGNIFICANT CHANGE UP (ref 0–0.9)
MONOCYTES NFR BLD AUTO: 8.6 % — SIGNIFICANT CHANGE UP (ref 2–14)
NEUTROPHILS # BLD AUTO: 4.49 K/UL — SIGNIFICANT CHANGE UP (ref 1.8–7.4)
NEUTROPHILS NFR BLD AUTO: 60.6 % — SIGNIFICANT CHANGE UP (ref 43–77)
NRBC # FLD: 0 K/UL — SIGNIFICANT CHANGE UP (ref 0–0)
PCO2 BLDV: 46 MMHG — SIGNIFICANT CHANGE UP (ref 41–51)
PH BLDV: 7.36 PH — SIGNIFICANT CHANGE UP (ref 7.32–7.43)
PLATELET # BLD AUTO: 156 K/UL — SIGNIFICANT CHANGE UP (ref 150–400)
PMV BLD: 11.6 FL — SIGNIFICANT CHANGE UP (ref 7–13)
PO2 BLDV: 57 MMHG — HIGH (ref 35–40)
POTASSIUM BLDV-SCNC: 3.5 MMOL/L — SIGNIFICANT CHANGE UP (ref 3.4–4.5)
POTASSIUM SERPL-MCNC: 4.2 MMOL/L — SIGNIFICANT CHANGE UP (ref 3.5–5.3)
POTASSIUM SERPL-SCNC: 4.2 MMOL/L — SIGNIFICANT CHANGE UP (ref 3.5–5.3)
PROT SERPL-MCNC: 6.1 G/DL — SIGNIFICANT CHANGE UP (ref 6–8.3)
RBC # BLD: 4.17 M/UL — LOW (ref 4.2–5.8)
RBC # FLD: 12.9 % — SIGNIFICANT CHANGE UP (ref 10.3–14.5)
SAO2 % BLDV: 86.7 % — HIGH (ref 60–85)
SODIUM SERPL-SCNC: 136 MMOL/L — SIGNIFICANT CHANGE UP (ref 135–145)
TROPONIN T, HIGH SENSITIVITY: 11 NG/L — SIGNIFICANT CHANGE UP (ref ?–14)
WBC # BLD: 7.4 K/UL — SIGNIFICANT CHANGE UP (ref 3.8–10.5)
WBC # FLD AUTO: 7.4 K/UL — SIGNIFICANT CHANGE UP (ref 3.8–10.5)

## 2020-06-06 PROCEDURE — 76705 ECHO EXAM OF ABDOMEN: CPT | Mod: 26

## 2020-06-06 PROCEDURE — 99232 SBSQ HOSP IP/OBS MODERATE 35: CPT

## 2020-06-06 RX ADMIN — OXYCODONE AND ACETAMINOPHEN 1 TABLET(S): 5; 325 TABLET ORAL at 08:55

## 2020-06-06 RX ADMIN — OXYCODONE AND ACETAMINOPHEN 1 TABLET(S): 5; 325 TABLET ORAL at 13:19

## 2020-06-06 RX ADMIN — ZIPRASIDONE HYDROCHLORIDE 20 MILLIGRAM(S): 20 CAPSULE ORAL at 08:55

## 2020-06-06 RX ADMIN — ALBUTEROL 2 PUFF(S): 90 AEROSOL, METERED ORAL at 09:07

## 2020-06-06 RX ADMIN — GABAPENTIN 600 MILLIGRAM(S): 400 CAPSULE ORAL at 13:19

## 2020-06-06 RX ADMIN — GABAPENTIN 600 MILLIGRAM(S): 400 CAPSULE ORAL at 08:50

## 2020-06-06 RX ADMIN — Medication 20 MILLIGRAM(S): at 08:50

## 2020-06-06 RX ADMIN — Medication 2 MILLIGRAM(S): at 02:42

## 2020-06-06 RX ADMIN — CELECOXIB 200 MILLIGRAM(S): 200 CAPSULE ORAL at 08:50

## 2020-06-06 RX ADMIN — Medication 1 PATCH: at 13:22

## 2020-06-06 RX ADMIN — Medication 100 MILLIGRAM(S): at 21:18

## 2020-06-06 RX ADMIN — OXYCODONE AND ACETAMINOPHEN 1 TABLET(S): 5; 325 TABLET ORAL at 21:18

## 2020-06-06 RX ADMIN — Medication 2 MILLIGRAM(S): at 09:49

## 2020-06-06 RX ADMIN — GABAPENTIN 600 MILLIGRAM(S): 400 CAPSULE ORAL at 21:18

## 2020-06-06 RX ADMIN — LISINOPRIL 5 MILLIGRAM(S): 2.5 TABLET ORAL at 08:50

## 2020-06-06 NOTE — ED PROVIDER NOTE - CLINICAL SUMMARY MEDICAL DECISION MAKING FREE TEXT BOX
53 y/o M transferred from [facility] for evaluation of abd pain. Patient also complained of generalized chest pain to triage nurse. PEx benign, patient resting comfortably, abd nontender at this time. Vss. Given hx of gall stones, RUQ pain earlier and hpi, will do chest pain work-up (ekg, trop, cxr), also obtain lipase/basics and RUQ u/s.

## 2020-06-06 NOTE — ED PROVIDER NOTE - ATTENDING CONTRIBUTION TO CARE
MD Guillory: patient seen and evaluated with the resident.  I was present for key portions of the History & Physical, and I agree with the Impression & Plan.    MD Guillory: 54M PMH schizophrenia, DM, HTN presents MD Guillory: patient seen and evaluated with the resident.  I was present for key portions of the History & Physical, and I agree with the Impression & Plan.    MD Guillory: 54M PMH schizophrenia, DM, HTN, COPD, asthma, gallstones transferred from [facility] with chest pain and abdominal pain.  Patient reports chest pain has resolved but abdominal pain is localized to RUQ and LUQ.  Patient denies nausea, vomiting, dyspnea, fevers, chills, cough, diarrhea, dysuria, melena, hematochezia.  Transfer note indicates pain started this moning and gradually worsened.  TTP RUQ and LUQ.  Will workup abdominal pain with ultrasound, labs.  If ultrasound is normal but patient is still with abdominal pain, will get CT abdomen.  Reassess.

## 2020-06-06 NOTE — ED PROVIDER NOTE - PHYSICAL EXAMINATION
[Const] well-appearing, resting comfortably, no acute distress  [HEENT] PERRL, EOMI, moist mucus membranes  [Neck] Supple, trachea midline  [CV] +S1/S2, no m/r/g appreciated  [Lungs] Clear to auscultations bilaterally, no adventitious lung sounds  [Abd] neg hernandez, no RUQ tenderness to palpation, minimal LUQ tenderness  [MSK] 5/5 upper extremity and lower extremity str bilaterally, mild left lower rib TTP  [Skin] warm, dry, well-perfused  [Neuro] A&Ox3, Cranial Nerves II-XII intact

## 2020-06-06 NOTE — ED PROVIDER NOTE - NSFOLLOWUPINSTRUCTIONS_ED_ALL_ED_FT
You were seen in the Emergency Department for abdominal pain.   1) Advance activity as tolerated.    2) Continue all previously prescribed medications as directed.    3) Follow up with your primary care physician within the next 7 days.   4) Return to the Emergency Department for worsening or persistent symptoms, and/or ANY NEW OR CONCERNING SYMPTOMS as described below.    You were seen today in the emergency room for abdominal pain. Although the testing done today indicates that your pain is not from an acute emergency, your pain could still represent a more serious problem. Most commonly, the pain you are having is likely not something serious and will resolve in a few days, however testing was done today based on the symptoms that you currently have; so if you develop new or worsening symptoms this could be a sign of a problem that was not tested for and means you should come back to the emergency room or see your doctor urgently. You need to follow up with your doctor in the next 48-72 hours. If you develop any new or worsening symptoms you need to return immediately to the emergency department. If you experience any of the following please come right back to the emergency room: severe nausea and vomiting with inability to tolerate eating, severe worsening of your pain, a new fever, new bleeding in stool or vomit, confusion, new numbness or weakness, passing out.

## 2020-06-06 NOTE — ED PROVIDER NOTE - PROGRESS NOTE DETAILS
Kahlil HARKINS: patient's ultrasound negative.  Patient reevaluated and reports he feels well.  Abdominal pain has resolved.  Patient's abdomen soft NTND, well appearing, lungs clear RRR.

## 2020-06-06 NOTE — ED PROVIDER NOTE - OBJECTIVE STATEMENT
55 y/o M w/ hx schizoaffective, bipolar disorder, COPD, HTN, asthma, ALEJANDRO, self-reported gallstones 12 years ago transferred from [facility] with complaints of generalized chest pain and RUQ pain. Upon arrival, patient very sleepy, states he is having abdominal pain for 4-5 years, no acute changes recently. Denies active chest pain/pressure, sob, fevers/chills, musculoskeletal complaints, denies current RUQ pain, nausea/vomiting, changes in BM, urinary changes, hematochezia/melena.    Per recent transfer note: 10/10 RUQ pain which started in the morning and gradually worsened. Pt vomited x 1. Pt denies constipation.  Pt states he had gallbladder stones around 12 years ago with similar pain and nothing was done at that time. On abdominal examination, pt is guarded, TTP in RUQ, Nondistended, with Hyperactive bowel sounds. Pt to be transferred for further workup of abdominal pain, likely biliary cholic.     Meds:  celecoxib 200 milliGRAM(s) Oral daily  FLUoxetine 20 milliGRAM(s) Oral daily  gabapentin 600 milliGRAM(s) Oral three times a day  lisinopril 5 milliGRAM(s) Oral daily  oxycodone    5 mG/acetaminophen 325 mG 1 Tablet(s) Oral every 8 hours  traZODone 100 milliGRAM(s) Oral at bedtime  ziprasidone 20 milliGRAM(s) Oral <User Schedule>

## 2020-06-06 NOTE — ED PROVIDER NOTE - PATIENT PORTAL LINK FT
You can access the FollowMyHealth Patient Portal offered by [facility] by registering at the following website: [email or web address]. By joining [employer]’s FollowMyHealth portal, you will also be able to view your health information using other applications (apps) compatible with our system.

## 2020-06-07 PROCEDURE — 99232 SBSQ HOSP IP/OBS MODERATE 35: CPT

## 2020-06-07 RX ORDER — ZIPRASIDONE HYDROCHLORIDE 20 MG/1
20 CAPSULE ORAL ONCE
Refills: 0 | Status: COMPLETED | OUTPATIENT
Start: 2020-06-07 | End: 2020-06-07

## 2020-06-07 RX ORDER — ZIPRASIDONE HYDROCHLORIDE 20 MG/1
40 CAPSULE ORAL
Refills: 0 | Status: DISCONTINUED | OUTPATIENT
Start: 2020-06-07 | End: 2020-06-09

## 2020-06-07 RX ADMIN — ZIPRASIDONE HYDROCHLORIDE 40 MILLIGRAM(S): 20 CAPSULE ORAL at 21:12

## 2020-06-07 RX ADMIN — OXYCODONE AND ACETAMINOPHEN 1 TABLET(S): 5; 325 TABLET ORAL at 12:58

## 2020-06-07 RX ADMIN — ZIPRASIDONE HYDROCHLORIDE 20 MILLIGRAM(S): 20 CAPSULE ORAL at 08:21

## 2020-06-07 RX ADMIN — OXYCODONE AND ACETAMINOPHEN 1 TABLET(S): 5; 325 TABLET ORAL at 21:12

## 2020-06-07 RX ADMIN — OXYCODONE AND ACETAMINOPHEN 1 TABLET(S): 5; 325 TABLET ORAL at 08:15

## 2020-06-07 RX ADMIN — CELECOXIB 200 MILLIGRAM(S): 200 CAPSULE ORAL at 08:16

## 2020-06-07 RX ADMIN — Medication 50 MILLIGRAM(S): at 13:19

## 2020-06-07 RX ADMIN — GABAPENTIN 600 MILLIGRAM(S): 400 CAPSULE ORAL at 08:16

## 2020-06-07 RX ADMIN — Medication 20 MILLIGRAM(S): at 08:16

## 2020-06-07 RX ADMIN — ZIPRASIDONE HYDROCHLORIDE 20 MILLIGRAM(S): 20 CAPSULE ORAL at 10:59

## 2020-06-07 RX ADMIN — Medication 100 MILLIGRAM(S): at 21:12

## 2020-06-07 RX ADMIN — GABAPENTIN 600 MILLIGRAM(S): 400 CAPSULE ORAL at 12:58

## 2020-06-07 RX ADMIN — OLANZAPINE 5 MILLIGRAM(S): 15 TABLET, FILM COATED ORAL at 13:19

## 2020-06-07 RX ADMIN — LISINOPRIL 5 MILLIGRAM(S): 2.5 TABLET ORAL at 08:15

## 2020-06-07 RX ADMIN — Medication 2 MILLIGRAM(S): at 21:14

## 2020-06-07 RX ADMIN — Medication 2 MILLIGRAM(S): at 13:19

## 2020-06-07 RX ADMIN — GABAPENTIN 600 MILLIGRAM(S): 400 CAPSULE ORAL at 20:16

## 2020-06-08 PROCEDURE — 99232 SBSQ HOSP IP/OBS MODERATE 35: CPT

## 2020-06-08 RX ORDER — GABAPENTIN 400 MG/1
800 CAPSULE ORAL THREE TIMES A DAY
Refills: 0 | Status: DISCONTINUED | OUTPATIENT
Start: 2020-06-08 | End: 2020-06-15

## 2020-06-08 RX ADMIN — Medication 20 MILLIGRAM(S): at 09:13

## 2020-06-08 RX ADMIN — ZIPRASIDONE HYDROCHLORIDE 40 MILLIGRAM(S): 20 CAPSULE ORAL at 09:13

## 2020-06-08 RX ADMIN — OXYCODONE AND ACETAMINOPHEN 1 TABLET(S): 5; 325 TABLET ORAL at 06:45

## 2020-06-08 RX ADMIN — Medication 2 MILLIGRAM(S): at 17:45

## 2020-06-08 RX ADMIN — OLANZAPINE 5 MILLIGRAM(S): 15 TABLET, FILM COATED ORAL at 11:35

## 2020-06-08 RX ADMIN — Medication 50 MILLIGRAM(S): at 18:10

## 2020-06-08 RX ADMIN — Medication 100 MILLIGRAM(S): at 20:07

## 2020-06-08 RX ADMIN — OXYCODONE AND ACETAMINOPHEN 1 TABLET(S): 5; 325 TABLET ORAL at 13:01

## 2020-06-08 RX ADMIN — LISINOPRIL 5 MILLIGRAM(S): 2.5 TABLET ORAL at 09:13

## 2020-06-08 RX ADMIN — GABAPENTIN 800 MILLIGRAM(S): 400 CAPSULE ORAL at 20:07

## 2020-06-08 RX ADMIN — GABAPENTIN 600 MILLIGRAM(S): 400 CAPSULE ORAL at 12:42

## 2020-06-08 RX ADMIN — Medication 2 MILLIGRAM(S): at 18:10

## 2020-06-08 RX ADMIN — OLANZAPINE 5 MILLIGRAM(S): 15 TABLET, FILM COATED ORAL at 17:45

## 2020-06-08 RX ADMIN — CELECOXIB 200 MILLIGRAM(S): 200 CAPSULE ORAL at 09:10

## 2020-06-08 RX ADMIN — Medication 2 MILLIGRAM(S): at 11:35

## 2020-06-08 RX ADMIN — ZIPRASIDONE HYDROCHLORIDE 40 MILLIGRAM(S): 20 CAPSULE ORAL at 20:07

## 2020-06-08 RX ADMIN — GABAPENTIN 600 MILLIGRAM(S): 400 CAPSULE ORAL at 09:13

## 2020-06-09 LAB
CHOLEST SERPL-MCNC: 113 MG/DL — LOW (ref 120–199)
HBA1C BLD-MCNC: 5.4 % — SIGNIFICANT CHANGE UP (ref 4–5.6)
HDLC SERPL-MCNC: 40 MG/DL — SIGNIFICANT CHANGE UP (ref 35–55)
LIPID PNL WITH DIRECT LDL SERPL: 63 MG/DL — SIGNIFICANT CHANGE UP
TRIGL SERPL-MCNC: 116 MG/DL — SIGNIFICANT CHANGE UP (ref 10–149)

## 2020-06-09 PROCEDURE — 99232 SBSQ HOSP IP/OBS MODERATE 35: CPT

## 2020-06-09 RX ORDER — ACETAMINOPHEN 500 MG
500 TABLET ORAL EVERY 6 HOURS
Refills: 0 | Status: DISCONTINUED | OUTPATIENT
Start: 2020-06-09 | End: 2020-06-09

## 2020-06-09 RX ORDER — OXYCODONE AND ACETAMINOPHEN 5; 325 MG/1; MG/1
1 TABLET ORAL EVERY 8 HOURS
Refills: 0 | Status: DISCONTINUED | OUTPATIENT
Start: 2020-06-09 | End: 2020-06-15

## 2020-06-09 RX ORDER — ZIPRASIDONE HYDROCHLORIDE 20 MG/1
40 CAPSULE ORAL
Refills: 0 | Status: COMPLETED | OUTPATIENT
Start: 2020-06-09 | End: 2020-06-09

## 2020-06-09 RX ORDER — ZIPRASIDONE HYDROCHLORIDE 20 MG/1
60 CAPSULE ORAL
Refills: 0 | Status: DISCONTINUED | OUTPATIENT
Start: 2020-06-10 | End: 2020-06-12

## 2020-06-09 RX ORDER — OXYCODONE AND ACETAMINOPHEN 5; 325 MG/1; MG/1
1 TABLET ORAL EVERY 8 HOURS
Refills: 0 | Status: DISCONTINUED | OUTPATIENT
Start: 2020-06-09 | End: 2020-06-09

## 2020-06-09 RX ORDER — ACETAMINOPHEN 500 MG
650 TABLET ORAL EVERY 6 HOURS
Refills: 0 | Status: DISCONTINUED | OUTPATIENT
Start: 2020-06-09 | End: 2020-06-15

## 2020-06-09 RX ADMIN — OXYCODONE AND ACETAMINOPHEN 1 TABLET(S): 5; 325 TABLET ORAL at 13:37

## 2020-06-09 RX ADMIN — Medication 50 MILLIGRAM(S): at 21:05

## 2020-06-09 RX ADMIN — ZIPRASIDONE HYDROCHLORIDE 40 MILLIGRAM(S): 20 CAPSULE ORAL at 08:59

## 2020-06-09 RX ADMIN — GABAPENTIN 800 MILLIGRAM(S): 400 CAPSULE ORAL at 08:59

## 2020-06-09 RX ADMIN — GABAPENTIN 800 MILLIGRAM(S): 400 CAPSULE ORAL at 13:37

## 2020-06-09 RX ADMIN — OXYCODONE AND ACETAMINOPHEN 1 TABLET(S): 5; 325 TABLET ORAL at 19:37

## 2020-06-09 RX ADMIN — Medication 2 MILLIGRAM(S): at 12:11

## 2020-06-09 RX ADMIN — GABAPENTIN 800 MILLIGRAM(S): 400 CAPSULE ORAL at 20:16

## 2020-06-09 RX ADMIN — Medication 20 MILLIGRAM(S): at 08:59

## 2020-06-09 RX ADMIN — ZIPRASIDONE HYDROCHLORIDE 40 MILLIGRAM(S): 20 CAPSULE ORAL at 20:33

## 2020-06-09 RX ADMIN — CELECOXIB 200 MILLIGRAM(S): 200 CAPSULE ORAL at 08:59

## 2020-06-09 RX ADMIN — Medication 100 MILLIGRAM(S): at 20:33

## 2020-06-09 RX ADMIN — OXYCODONE AND ACETAMINOPHEN 1 TABLET(S): 5; 325 TABLET ORAL at 06:37

## 2020-06-09 RX ADMIN — Medication 50 MILLIGRAM(S): at 12:11

## 2020-06-09 RX ADMIN — Medication 2 MILLIGRAM(S): at 21:05

## 2020-06-09 RX ADMIN — OLANZAPINE 5 MILLIGRAM(S): 15 TABLET, FILM COATED ORAL at 12:11

## 2020-06-09 RX ADMIN — LISINOPRIL 5 MILLIGRAM(S): 2.5 TABLET ORAL at 08:59

## 2020-06-09 RX ADMIN — OXYCODONE AND ACETAMINOPHEN 1 TABLET(S): 5; 325 TABLET ORAL at 00:20

## 2020-06-10 PROCEDURE — 99231 SBSQ HOSP IP/OBS SF/LOW 25: CPT

## 2020-06-10 RX ORDER — QUETIAPINE FUMARATE 200 MG/1
50 TABLET, FILM COATED ORAL EVERY 6 HOURS
Refills: 0 | Status: DISCONTINUED | OUTPATIENT
Start: 2020-06-10 | End: 2020-06-15

## 2020-06-10 RX ADMIN — QUETIAPINE FUMARATE 50 MILLIGRAM(S): 200 TABLET, FILM COATED ORAL at 17:31

## 2020-06-10 RX ADMIN — Medication 2 MILLIGRAM(S): at 20:00

## 2020-06-10 RX ADMIN — Medication 50 MILLIGRAM(S): at 17:32

## 2020-06-10 RX ADMIN — Medication 50 MILLIGRAM(S): at 08:05

## 2020-06-10 RX ADMIN — GABAPENTIN 800 MILLIGRAM(S): 400 CAPSULE ORAL at 08:05

## 2020-06-10 RX ADMIN — OXYCODONE AND ACETAMINOPHEN 1 TABLET(S): 5; 325 TABLET ORAL at 17:31

## 2020-06-10 RX ADMIN — ZIPRASIDONE HYDROCHLORIDE 60 MILLIGRAM(S): 20 CAPSULE ORAL at 08:05

## 2020-06-10 RX ADMIN — Medication 2 MILLIGRAM(S): at 08:05

## 2020-06-10 RX ADMIN — LISINOPRIL 5 MILLIGRAM(S): 2.5 TABLET ORAL at 08:05

## 2020-06-10 RX ADMIN — CELECOXIB 200 MILLIGRAM(S): 200 CAPSULE ORAL at 08:05

## 2020-06-10 RX ADMIN — GABAPENTIN 800 MILLIGRAM(S): 400 CAPSULE ORAL at 20:00

## 2020-06-10 RX ADMIN — OLANZAPINE 5 MILLIGRAM(S): 15 TABLET, FILM COATED ORAL at 08:05

## 2020-06-10 RX ADMIN — Medication 20 MILLIGRAM(S): at 08:05

## 2020-06-10 RX ADMIN — ZIPRASIDONE HYDROCHLORIDE 60 MILLIGRAM(S): 20 CAPSULE ORAL at 20:00

## 2020-06-10 RX ADMIN — Medication 100 MILLIGRAM(S): at 20:00

## 2020-06-10 RX ADMIN — OXYCODONE AND ACETAMINOPHEN 1 TABLET(S): 5; 325 TABLET ORAL at 08:05

## 2020-06-10 RX ADMIN — Medication 2 MILLIGRAM(S): at 17:32

## 2020-06-10 RX ADMIN — GABAPENTIN 800 MILLIGRAM(S): 400 CAPSULE ORAL at 12:47

## 2020-06-11 PROCEDURE — 70553 MRI BRAIN STEM W/O & W/DYE: CPT | Mod: 26

## 2020-06-11 PROCEDURE — 99222 1ST HOSP IP/OBS MODERATE 55: CPT

## 2020-06-11 PROCEDURE — 99232 SBSQ HOSP IP/OBS MODERATE 35: CPT

## 2020-06-11 RX ORDER — QUETIAPINE FUMARATE 200 MG/1
50 TABLET, FILM COATED ORAL ONCE
Refills: 0 | Status: COMPLETED | OUTPATIENT
Start: 2020-06-11 | End: 2020-06-11

## 2020-06-11 RX ORDER — FLUOXETINE HCL 10 MG
30 CAPSULE ORAL DAILY
Refills: 0 | Status: DISCONTINUED | OUTPATIENT
Start: 2020-06-11 | End: 2020-06-15

## 2020-06-11 RX ORDER — BUDESONIDE AND FORMOTEROL FUMARATE DIHYDRATE 160; 4.5 UG/1; UG/1
2 AEROSOL RESPIRATORY (INHALATION)
Refills: 0 | Status: DISCONTINUED | OUTPATIENT
Start: 2020-06-11 | End: 2020-06-15

## 2020-06-11 RX ORDER — TIOTROPIUM BROMIDE 18 UG/1
1 CAPSULE ORAL; RESPIRATORY (INHALATION) DAILY
Refills: 0 | Status: DISCONTINUED | OUTPATIENT
Start: 2020-06-11 | End: 2020-06-15

## 2020-06-11 RX ADMIN — Medication 50 MILLIGRAM(S): at 09:40

## 2020-06-11 RX ADMIN — QUETIAPINE FUMARATE 50 MILLIGRAM(S): 200 TABLET, FILM COATED ORAL at 09:39

## 2020-06-11 RX ADMIN — TIOTROPIUM BROMIDE 1 CAPSULE(S): 18 CAPSULE ORAL; RESPIRATORY (INHALATION) at 15:59

## 2020-06-11 RX ADMIN — Medication 1 MILLIGRAM(S): at 20:11

## 2020-06-11 RX ADMIN — Medication 2 MILLIGRAM(S): at 18:00

## 2020-06-11 RX ADMIN — GABAPENTIN 800 MILLIGRAM(S): 400 CAPSULE ORAL at 20:11

## 2020-06-11 RX ADMIN — ALBUTEROL 2 PUFF(S): 90 AEROSOL, METERED ORAL at 15:59

## 2020-06-11 RX ADMIN — QUETIAPINE FUMARATE 50 MILLIGRAM(S): 200 TABLET, FILM COATED ORAL at 20:11

## 2020-06-11 RX ADMIN — Medication 20 MILLIGRAM(S): at 08:52

## 2020-06-11 RX ADMIN — QUETIAPINE FUMARATE 50 MILLIGRAM(S): 200 TABLET, FILM COATED ORAL at 17:59

## 2020-06-11 RX ADMIN — CELECOXIB 200 MILLIGRAM(S): 200 CAPSULE ORAL at 08:52

## 2020-06-11 RX ADMIN — Medication 2 MILLIGRAM(S): at 09:39

## 2020-06-11 RX ADMIN — OXYCODONE AND ACETAMINOPHEN 1 TABLET(S): 5; 325 TABLET ORAL at 22:10

## 2020-06-11 RX ADMIN — ZIPRASIDONE HYDROCHLORIDE 60 MILLIGRAM(S): 20 CAPSULE ORAL at 08:52

## 2020-06-11 RX ADMIN — Medication 100 MILLIGRAM(S): at 21:48

## 2020-06-11 RX ADMIN — GABAPENTIN 800 MILLIGRAM(S): 400 CAPSULE ORAL at 08:52

## 2020-06-11 RX ADMIN — OXYCODONE AND ACETAMINOPHEN 1 TABLET(S): 5; 325 TABLET ORAL at 15:40

## 2020-06-11 RX ADMIN — GABAPENTIN 800 MILLIGRAM(S): 400 CAPSULE ORAL at 13:01

## 2020-06-11 RX ADMIN — Medication 650 MILLIGRAM(S): at 21:10

## 2020-06-11 RX ADMIN — BUDESONIDE AND FORMOTEROL FUMARATE DIHYDRATE 2 PUFF(S): 160; 4.5 AEROSOL RESPIRATORY (INHALATION) at 22:14

## 2020-06-11 RX ADMIN — Medication 50 MILLIGRAM(S): at 18:00

## 2020-06-11 RX ADMIN — LISINOPRIL 5 MILLIGRAM(S): 2.5 TABLET ORAL at 08:52

## 2020-06-11 RX ADMIN — ZIPRASIDONE HYDROCHLORIDE 60 MILLIGRAM(S): 20 CAPSULE ORAL at 21:48

## 2020-06-11 NOTE — CONSULT NOTE ADULT - ASSESSMENT
54 y.o. M with h/o COPD/asthma, O. SA on Nocturnal C-PAP DM2, schizoaffective d/o admitted for S.A. by slicing his left wrist. S/p wound irrigation and closing of left wrist laceration in ED on June 4. admitted for management of depression and schizoaffective d/o.    1. Left wrist laceration: S/P wound closure of 3 laceration in ED. One remains open, but no signs of infection.   -Wound care consult  -Check CBC in AM  -No indication for abx at this time.    2. COPD/asthma. Stable.  -Start Symbicort 1 puff 2X/day  -Start Spiriva 1 puff daily  -Cont. Albuterol MDI Q6H PRN     3. ALEJANDRO:  -Cont. Nocturnal C-PAP    4. DM2. Not on any meds, A1C 5.4  -Cont. diet control    5. HTN:  -Cont. Lisinopril     6. Low back pain: chronic   -Cont. Neurontin and Celebrex    7. Schizoaffective d/o  -Management as per Psych

## 2020-06-11 NOTE — CONSULT NOTE ADULT - SUBJECTIVE AND OBJECTIVE BOX
HPI: 54 y.o. M with h/o COPD/asthma, PSA on Nocturnal C-PAP DM2, schizoaffective d/o admitted for S.A. by slicing his left wrist. S/p wound irrigation and closing of left wrist laceration in ED on June 4. admitted for management of depression and schizoaffective d/o. Called to evaluate pt for possible infection of wound. Pt has no complaints at this time denies any pain of left wrist. No fever or chills. No SOB. C/O chronic low back pain.     PAST MEDICAL & SURGICAL HISTORY:  Schizo affective schizophrenia  COPD with asthma  Obstructive sleep apnea: CPAP for 7-8 years  Schizoaffective disorder, bipolar type  Diabetes: Type 2; pt lost weight, no longer taking meds  Hypertension, unspecified type  Paranoia  Uncomplicated asthma, unspecified asthma severity  S/P appendectomy      Review of Systems:   CONSTITUTIONAL: No fever, weight loss, or fatigue  EYES: No eye pain, visual disturbances, or discharge  ENMT:  No difficulty hearing, tinnitus, vertigo; No sinus or throat pain  NECK: No pain or stiffness  RESPIRATORY: No cough, wheezing, chills or hemoptysis; No shortness of breath  CARDIOVASCULAR: No chest pain, palpitations, dizziness, or leg swelling  GASTROINTESTINAL: No abdominal or epigastric pain. No nausea, vomiting, or hematemesis; No diarrhea or constipation. No melena or hematochezia.  GENITOURINARY: No dysuria, frequency, hematuria, or incontinence  NEUROLOGICAL: No headaches, (+) memory loss, loss of strength, numbness, or tremors  SKIN: No itching, burning, rashes, or lesions   LYMPH NODES: No enlarged glands  ENDOCRINE: No heat or cold intolerance; No hair loss  MUSCULOSKELETAL: No joint pain or swelling; (+) low back pain, radiating to b/l legs.   HEME/LYMPH: No easy bruising, or bleeding gums  ALLERY AND IMMUNOLOGIC: No hives or eczema    Allergies    Thorazine (Unknown)    Intolerances    Haldol (Unknown)      Social History:     FAMILY HISTORY:  FH: cirrhosis: maternal grandmother, uncle      MEDICATIONS  (STANDING):  celecoxib 200 milliGRAM(s) Oral daily  FLUoxetine 30 milliGRAM(s) Oral daily  gabapentin 800 milliGRAM(s) Oral three times a day  lisinopril 5 milliGRAM(s) Oral daily  traZODone 100 milliGRAM(s) Oral at bedtime  ziprasidone 60 milliGRAM(s) Oral two times a day    MEDICATIONS  (PRN):  acetaminophen   Tablet .. 650 milliGRAM(s) Oral every 6 hours PRN Temp greater or equal to 38C (100.4F), Mild Pain (1 - 3), Moderate Pain (4 - 6)  ALBUTerol    90 MICROgram(s) HFA Inhaler 2 Puff(s) Inhalation every 6 hours PRN copd  diphenhydrAMINE 50 milliGRAM(s) Oral every 6 hours PRN agitation  diphenhydrAMINE   Injectable 50 milliGRAM(s) IntraMuscular every 6 hours PRN severe agitation  LORazepam     Tablet 2 milliGRAM(s) Oral every 6 hours PRN agitation  nicotine  Polacrilex Gum 4 milliGRAM(s) Oral every 3 hours PRN nicotine replacement  nicotine - 21 mG/24Hr(s) Patch 1 patch Transdermal daily PRN nicotine replacement  oxycodone    5 mG/acetaminophen 325 mG 1 Tablet(s) Oral every 8 hours PRN chronic pain  QUEtiapine 50 milliGRAM(s) Oral every 6 hours PRN agitation  ziprasidone Injectable 10 milliGRAM(s) IntraMuscular once PRN agitation      Vital Signs Last 24 Hrs  T(C): 36.2 (11 Jun 2020 06:48), Max: 36.8 (10 Pankaj 2020 19:11)  T(F): 97.2 (11 Jun 2020 06:48), Max: 98.3 (10 Pankaj 2020 19:11)  HR: 70 (11 Jun 2020 06:48) (70 - 70)  BP: 151/89 (11 Jun 2020 06:48) (151/89 - 151/89)  BP(mean): --  RR: --16  SpO2: --  CAPILLARY BLOOD GLUCOSE            PHYSICAL EXAM:  GENERAL: NAD, well-developed  HEAD:  Atraumatic, Normocephalic  EYES: EOMI, conjunctiva and sclera clear  NECK: Supple, No JVD  CHEST/LUNG: Clear to auscultation bilaterally; No wheeze  HEART: Regular rate and rhythm; No murmurs, rubs, or gallops  ABDOMEN: Soft, Nontender, Nondistended; Bowel sounds present  EXTREMITIES:  2+ Peripheral Pulses, No clubbing, cyanosis, or edema. (+) 3 laceration wounds at left wrist, 2 healed well. One laceration remains open but no exdudate or pus, no erythema surrounding the wound.   NEUROLOGY: non-focal  SKIN: No rashes or lesions    LABS:    A1C with Estimated Average Glucose in AM (06.09.20 @ 07:40)    A1C with Estimated Average Glucose: 5.4: High Risk (prediabetic)    5.7 - 6.4 %  Diabetic, diagnostic           > 6.5 %  ADA diabetic treatment goal    < 7.0 %    HbA1C values may not accurately reflect mean blood glucose  in patients with Hb variants.  Suggest clinical correlation. %                    EKG(personally reviewed): < from: 12 Lead ECG (06.06.20 @ 01:26) >    Ventricular Rate 57 BPM    Atrial Rate 57 BPM    P-R Interval 166 ms    QRS Duration 94 ms    Q-T Interval 436 ms    QTC Calculation(Bezet) 424 ms    P Axis 54 degrees    R Axis -22 degrees    T Axis 56 degrees    Diagnosis Line Sinus bradycardia  Septal infarct , age undetermined  Abnormal ECG    < end of copied text >      RADIOLOGY & ADDITIONAL TESTS:    Imaging Personally Reviewed:    Consultant(s) Notes Reviewed:      Care Discussed with Consultants/Other Providers:

## 2020-06-12 LAB
BASOPHILS # BLD AUTO: 0.04 K/UL — SIGNIFICANT CHANGE UP (ref 0–0.2)
BASOPHILS NFR BLD AUTO: 0.7 % — SIGNIFICANT CHANGE UP (ref 0–2)
EOSINOPHIL # BLD AUTO: 0.33 K/UL — SIGNIFICANT CHANGE UP (ref 0–0.5)
EOSINOPHIL NFR BLD AUTO: 5.6 % — SIGNIFICANT CHANGE UP (ref 0–6)
FOLATE SERPL-MCNC: 7.2 NG/ML — SIGNIFICANT CHANGE UP (ref 4.7–20)
HCT VFR BLD CALC: 40.9 % — SIGNIFICANT CHANGE UP (ref 39–50)
HGB BLD-MCNC: 13 G/DL — SIGNIFICANT CHANGE UP (ref 13–17)
IMM GRANULOCYTES NFR BLD AUTO: 0.3 % — SIGNIFICANT CHANGE UP (ref 0–1.5)
LYMPHOCYTES # BLD AUTO: 1.71 K/UL — SIGNIFICANT CHANGE UP (ref 1–3.3)
LYMPHOCYTES # BLD AUTO: 28.8 % — SIGNIFICANT CHANGE UP (ref 13–44)
MCHC RBC-ENTMCNC: 30.1 PG — SIGNIFICANT CHANGE UP (ref 27–34)
MCHC RBC-ENTMCNC: 31.8 % — LOW (ref 32–36)
MCV RBC AUTO: 94.7 FL — SIGNIFICANT CHANGE UP (ref 80–100)
MONOCYTES # BLD AUTO: 0.42 K/UL — SIGNIFICANT CHANGE UP (ref 0–0.9)
MONOCYTES NFR BLD AUTO: 7.1 % — SIGNIFICANT CHANGE UP (ref 2–14)
NEUTROPHILS # BLD AUTO: 3.42 K/UL — SIGNIFICANT CHANGE UP (ref 1.8–7.4)
NEUTROPHILS NFR BLD AUTO: 57.5 % — SIGNIFICANT CHANGE UP (ref 43–77)
NRBC # FLD: 0 K/UL — SIGNIFICANT CHANGE UP (ref 0–0)
PLATELET # BLD AUTO: 190 K/UL — SIGNIFICANT CHANGE UP (ref 150–400)
PMV BLD: 11.4 FL — SIGNIFICANT CHANGE UP (ref 7–13)
RBC # BLD: 4.32 M/UL — SIGNIFICANT CHANGE UP (ref 4.2–5.8)
RBC # FLD: 13.6 % — SIGNIFICANT CHANGE UP (ref 10.3–14.5)
VIT B12 SERPL-MCNC: 586 PG/ML — SIGNIFICANT CHANGE UP (ref 200–900)
WBC # BLD: 5.94 K/UL — SIGNIFICANT CHANGE UP (ref 3.8–10.5)
WBC # FLD AUTO: 5.94 K/UL — SIGNIFICANT CHANGE UP (ref 3.8–10.5)

## 2020-06-12 PROCEDURE — 99232 SBSQ HOSP IP/OBS MODERATE 35: CPT

## 2020-06-12 RX ORDER — ZIPRASIDONE HYDROCHLORIDE 20 MG/1
20 CAPSULE ORAL EVERY 6 HOURS
Refills: 0 | Status: DISCONTINUED | OUTPATIENT
Start: 2020-06-12 | End: 2020-06-15

## 2020-06-12 RX ORDER — ZIPRASIDONE HYDROCHLORIDE 20 MG/1
80 CAPSULE ORAL
Refills: 0 | Status: DISCONTINUED | OUTPATIENT
Start: 2020-06-13 | End: 2021-05-12

## 2020-06-12 RX ORDER — THIAMINE MONONITRATE (VIT B1) 100 MG
100 TABLET ORAL DAILY
Refills: 0 | Status: DISCONTINUED | OUTPATIENT
Start: 2020-06-12 | End: 2020-06-15

## 2020-06-12 RX ORDER — ZIPRASIDONE HYDROCHLORIDE 20 MG/1
20 CAPSULE ORAL ONCE
Refills: 0 | Status: COMPLETED | OUTPATIENT
Start: 2020-06-12 | End: 2020-06-12

## 2020-06-12 RX ORDER — ZIPRASIDONE HYDROCHLORIDE 20 MG/1
60 CAPSULE ORAL
Refills: 0 | Status: COMPLETED | OUTPATIENT
Start: 2020-06-12 | End: 2020-06-12

## 2020-06-12 RX ORDER — ZIPRASIDONE HYDROCHLORIDE 20 MG/1
80 CAPSULE ORAL
Refills: 0 | Status: DISCONTINUED | OUTPATIENT
Start: 2020-06-13 | End: 2020-06-15

## 2020-06-12 RX ADMIN — Medication 50 MILLIGRAM(S): at 20:06

## 2020-06-12 RX ADMIN — Medication 1 MILLIGRAM(S): at 19:51

## 2020-06-12 RX ADMIN — OXYCODONE AND ACETAMINOPHEN 1 TABLET(S): 5; 325 TABLET ORAL at 12:25

## 2020-06-12 RX ADMIN — ZIPRASIDONE HYDROCHLORIDE 60 MILLIGRAM(S): 20 CAPSULE ORAL at 20:06

## 2020-06-12 RX ADMIN — GABAPENTIN 800 MILLIGRAM(S): 400 CAPSULE ORAL at 08:10

## 2020-06-12 RX ADMIN — QUETIAPINE FUMARATE 50 MILLIGRAM(S): 200 TABLET, FILM COATED ORAL at 22:00

## 2020-06-12 RX ADMIN — OXYCODONE AND ACETAMINOPHEN 1 TABLET(S): 5; 325 TABLET ORAL at 19:50

## 2020-06-12 RX ADMIN — CELECOXIB 200 MILLIGRAM(S): 200 CAPSULE ORAL at 08:11

## 2020-06-12 RX ADMIN — GABAPENTIN 800 MILLIGRAM(S): 400 CAPSULE ORAL at 12:25

## 2020-06-12 RX ADMIN — Medication 2 MILLIGRAM(S): at 11:54

## 2020-06-12 RX ADMIN — QUETIAPINE FUMARATE 50 MILLIGRAM(S): 200 TABLET, FILM COATED ORAL at 11:54

## 2020-06-12 RX ADMIN — ZIPRASIDONE HYDROCHLORIDE 20 MILLIGRAM(S): 20 CAPSULE ORAL at 14:01

## 2020-06-12 RX ADMIN — Medication 50 MILLIGRAM(S): at 11:53

## 2020-06-12 RX ADMIN — TIOTROPIUM BROMIDE 1 CAPSULE(S): 18 CAPSULE ORAL; RESPIRATORY (INHALATION) at 08:52

## 2020-06-12 RX ADMIN — Medication 2 MILLIGRAM(S): at 00:40

## 2020-06-12 RX ADMIN — Medication 50 MILLIGRAM(S): at 00:40

## 2020-06-12 RX ADMIN — ALBUTEROL 2 PUFF(S): 90 AEROSOL, METERED ORAL at 20:31

## 2020-06-12 RX ADMIN — GABAPENTIN 800 MILLIGRAM(S): 400 CAPSULE ORAL at 20:06

## 2020-06-12 RX ADMIN — LISINOPRIL 5 MILLIGRAM(S): 2.5 TABLET ORAL at 08:10

## 2020-06-12 RX ADMIN — ZIPRASIDONE HYDROCHLORIDE 60 MILLIGRAM(S): 20 CAPSULE ORAL at 08:10

## 2020-06-12 RX ADMIN — Medication 30 MILLIGRAM(S): at 08:10

## 2020-06-12 RX ADMIN — ALBUTEROL 2 PUFF(S): 90 AEROSOL, METERED ORAL at 08:56

## 2020-06-12 RX ADMIN — Medication 650 MILLIGRAM(S): at 16:45

## 2020-06-12 RX ADMIN — Medication 100 MILLIGRAM(S): at 20:06

## 2020-06-12 RX ADMIN — Medication 650 MILLIGRAM(S): at 22:00

## 2020-06-12 NOTE — CHART NOTE - NSCHARTNOTESELECT_GEN_ALL_CORE
Patient is calling to request another refill of the Adderall.  She reports that she lost the 3rd script  and is unable to find it.  I explained that Dr. Guerrero does not renew / refefill lost scripts.  She has been out since 9-18-17.  Thank you.  JOSE MIGUEL  
Event Note
Event Note

## 2020-06-12 NOTE — CHART NOTE - NSCHARTNOTEFT_GEN_A_CORE
Spoke to Plastic surgery on call Dr Farfan on the phone about the open laceration wound of left wrist. Since it has been more than 7 days, can not surgically close the wound as per plastic surgery. She rec wound care consult.  Wound care consult rec. to clean the wound with NS, apply bacitracin then cover the wound with Allovyn Gentle Border Lite daily. Spoke to Plastic surgery on call Dr Farfan on the phone about the open laceration wound of left wrist. Since it has been more than 7 days, can not surgically close the wound as per plastic surgery. She rec wound care consult.  Wound care consult rec. to clean the wound with NS, pat dry, apply bacitracin then cover the wound with Allovyn Gentle Border Lite daily.

## 2020-06-13 PROCEDURE — 99232 SBSQ HOSP IP/OBS MODERATE 35: CPT

## 2020-06-13 RX ORDER — DIPHENHYDRAMINE HCL 50 MG
25 CAPSULE ORAL ONCE
Refills: 0 | Status: COMPLETED | OUTPATIENT
Start: 2020-06-13 | End: 2020-06-13

## 2020-06-13 RX ADMIN — Medication 100 MILLIGRAM(S): at 09:19

## 2020-06-13 RX ADMIN — Medication 25 MILLIGRAM(S): at 22:25

## 2020-06-13 RX ADMIN — Medication 1 MILLIGRAM(S): at 18:56

## 2020-06-13 RX ADMIN — ZIPRASIDONE HYDROCHLORIDE 80 MILLIGRAM(S): 20 CAPSULE ORAL at 09:19

## 2020-06-13 RX ADMIN — BUDESONIDE AND FORMOTEROL FUMARATE DIHYDRATE 2 PUFF(S): 160; 4.5 AEROSOL RESPIRATORY (INHALATION) at 21:39

## 2020-06-13 RX ADMIN — BUDESONIDE AND FORMOTEROL FUMARATE DIHYDRATE 2 PUFF(S): 160; 4.5 AEROSOL RESPIRATORY (INHALATION) at 09:19

## 2020-06-13 RX ADMIN — Medication 100 MILLIGRAM(S): at 20:09

## 2020-06-13 RX ADMIN — Medication 50 MILLIGRAM(S): at 18:55

## 2020-06-13 RX ADMIN — GABAPENTIN 800 MILLIGRAM(S): 400 CAPSULE ORAL at 20:10

## 2020-06-13 RX ADMIN — QUETIAPINE FUMARATE 50 MILLIGRAM(S): 200 TABLET, FILM COATED ORAL at 18:56

## 2020-06-13 RX ADMIN — ZIPRASIDONE HYDROCHLORIDE 80 MILLIGRAM(S): 20 CAPSULE ORAL at 20:10

## 2020-06-13 RX ADMIN — Medication 1 TABLET(S): at 09:19

## 2020-06-13 RX ADMIN — LISINOPRIL 5 MILLIGRAM(S): 2.5 TABLET ORAL at 09:19

## 2020-06-13 RX ADMIN — GABAPENTIN 800 MILLIGRAM(S): 400 CAPSULE ORAL at 09:19

## 2020-06-13 RX ADMIN — TIOTROPIUM BROMIDE 1 CAPSULE(S): 18 CAPSULE ORAL; RESPIRATORY (INHALATION) at 09:19

## 2020-06-13 RX ADMIN — GABAPENTIN 800 MILLIGRAM(S): 400 CAPSULE ORAL at 12:39

## 2020-06-13 RX ADMIN — CELECOXIB 200 MILLIGRAM(S): 200 CAPSULE ORAL at 09:16

## 2020-06-13 RX ADMIN — OXYCODONE AND ACETAMINOPHEN 1 TABLET(S): 5; 325 TABLET ORAL at 10:55

## 2020-06-13 RX ADMIN — Medication 30 MILLIGRAM(S): at 09:18

## 2020-06-13 RX ADMIN — OXYCODONE AND ACETAMINOPHEN 1 TABLET(S): 5; 325 TABLET ORAL at 18:56

## 2020-06-14 PROCEDURE — 99231 SBSQ HOSP IP/OBS SF/LOW 25: CPT

## 2020-06-14 RX ORDER — ZIPRASIDONE HYDROCHLORIDE 20 MG/1
20 CAPSULE ORAL EVERY 6 HOURS
Refills: 0 | Status: DISCONTINUED | OUTPATIENT
Start: 2020-06-14 | End: 2020-06-15

## 2020-06-14 RX ADMIN — CELECOXIB 200 MILLIGRAM(S): 200 CAPSULE ORAL at 08:01

## 2020-06-14 RX ADMIN — BUDESONIDE AND FORMOTEROL FUMARATE DIHYDRATE 2 PUFF(S): 160; 4.5 AEROSOL RESPIRATORY (INHALATION) at 08:02

## 2020-06-14 RX ADMIN — Medication 50 MILLIGRAM(S): at 19:40

## 2020-06-14 RX ADMIN — GABAPENTIN 800 MILLIGRAM(S): 400 CAPSULE ORAL at 12:38

## 2020-06-14 RX ADMIN — ZIPRASIDONE HYDROCHLORIDE 80 MILLIGRAM(S): 20 CAPSULE ORAL at 20:07

## 2020-06-14 RX ADMIN — QUETIAPINE FUMARATE 50 MILLIGRAM(S): 200 TABLET, FILM COATED ORAL at 14:19

## 2020-06-14 RX ADMIN — ALBUTEROL 2 PUFF(S): 90 AEROSOL, METERED ORAL at 04:45

## 2020-06-14 RX ADMIN — OXYCODONE AND ACETAMINOPHEN 1 TABLET(S): 5; 325 TABLET ORAL at 15:06

## 2020-06-14 RX ADMIN — ZIPRASIDONE HYDROCHLORIDE 80 MILLIGRAM(S): 20 CAPSULE ORAL at 08:01

## 2020-06-14 RX ADMIN — Medication 100 MILLIGRAM(S): at 08:01

## 2020-06-14 RX ADMIN — GABAPENTIN 800 MILLIGRAM(S): 400 CAPSULE ORAL at 08:01

## 2020-06-14 RX ADMIN — Medication 1 TABLET(S): at 08:01

## 2020-06-14 RX ADMIN — Medication 100 MILLIGRAM(S): at 20:07

## 2020-06-14 RX ADMIN — Medication 30 MILLIGRAM(S): at 08:01

## 2020-06-14 RX ADMIN — OXYCODONE AND ACETAMINOPHEN 1 TABLET(S): 5; 325 TABLET ORAL at 07:01

## 2020-06-14 RX ADMIN — GABAPENTIN 800 MILLIGRAM(S): 400 CAPSULE ORAL at 20:07

## 2020-06-14 RX ADMIN — BUDESONIDE AND FORMOTEROL FUMARATE DIHYDRATE 2 PUFF(S): 160; 4.5 AEROSOL RESPIRATORY (INHALATION) at 20:07

## 2020-06-14 RX ADMIN — Medication 50 MILLIGRAM(S): at 14:20

## 2020-06-14 RX ADMIN — Medication 1 MILLIGRAM(S): at 19:40

## 2020-06-14 RX ADMIN — OXYCODONE AND ACETAMINOPHEN 1 TABLET(S): 5; 325 TABLET ORAL at 22:15

## 2020-06-14 RX ADMIN — Medication 1 MILLIGRAM(S): at 08:01

## 2020-06-14 RX ADMIN — Medication 1 MILLIGRAM(S): at 14:20

## 2020-06-14 RX ADMIN — QUETIAPINE FUMARATE 50 MILLIGRAM(S): 200 TABLET, FILM COATED ORAL at 22:15

## 2020-06-14 RX ADMIN — LISINOPRIL 5 MILLIGRAM(S): 2.5 TABLET ORAL at 08:01

## 2020-06-14 RX ADMIN — TIOTROPIUM BROMIDE 1 CAPSULE(S): 18 CAPSULE ORAL; RESPIRATORY (INHALATION) at 08:02

## 2020-06-14 RX ADMIN — Medication 50 MILLIGRAM(S): at 08:25

## 2020-06-14 RX ADMIN — Medication 50 MILLIGRAM(S): at 08:02

## 2020-06-14 RX ADMIN — ZIPRASIDONE HYDROCHLORIDE 20 MILLIGRAM(S): 20 CAPSULE ORAL at 09:15

## 2020-06-14 RX ADMIN — Medication 2 MILLIGRAM(S): at 08:25

## 2020-06-15 VITALS — TEMPERATURE: 97 F | HEART RATE: 62 BPM | SYSTOLIC BLOOD PRESSURE: 115 MMHG | DIASTOLIC BLOOD PRESSURE: 79 MMHG

## 2020-06-15 RX ORDER — GABAPENTIN 400 MG/1
1 CAPSULE ORAL
Qty: 90 | Refills: 0
Start: 2020-06-15 | End: 2020-07-14

## 2020-06-15 RX ORDER — ZIPRASIDONE HYDROCHLORIDE 20 MG/1
1 CAPSULE ORAL
Qty: 60 | Refills: 0
Start: 2020-06-15 | End: 2020-07-14

## 2020-06-15 RX ORDER — TRAZODONE HCL 50 MG
1 TABLET ORAL
Qty: 30 | Refills: 0
Start: 2020-06-15 | End: 2020-07-14

## 2020-06-15 RX ORDER — FLUOXETINE HCL 10 MG
3 CAPSULE ORAL
Qty: 90 | Refills: 0
Start: 2020-06-15 | End: 2020-07-14

## 2020-06-15 RX ORDER — TIOTROPIUM BROMIDE 18 UG/1
1 CAPSULE ORAL; RESPIRATORY (INHALATION)
Qty: 30 | Refills: 0
Start: 2020-06-15 | End: 2020-07-14

## 2020-06-15 RX ORDER — LISINOPRIL 2.5 MG/1
1 TABLET ORAL
Qty: 30 | Refills: 0
Start: 2020-06-15 | End: 2020-07-14

## 2020-06-15 RX ADMIN — Medication 50 MILLIGRAM(S): at 10:15

## 2020-06-15 RX ADMIN — CELECOXIB 200 MILLIGRAM(S): 200 CAPSULE ORAL at 08:24

## 2020-06-15 RX ADMIN — Medication 50 MILLIGRAM(S): at 03:55

## 2020-06-15 RX ADMIN — GABAPENTIN 800 MILLIGRAM(S): 400 CAPSULE ORAL at 08:24

## 2020-06-15 RX ADMIN — TIOTROPIUM BROMIDE 1 CAPSULE(S): 18 CAPSULE ORAL; RESPIRATORY (INHALATION) at 08:26

## 2020-06-15 RX ADMIN — GABAPENTIN 800 MILLIGRAM(S): 400 CAPSULE ORAL at 12:01

## 2020-06-15 RX ADMIN — LISINOPRIL 5 MILLIGRAM(S): 2.5 TABLET ORAL at 08:24

## 2020-06-15 RX ADMIN — ZIPRASIDONE HYDROCHLORIDE 80 MILLIGRAM(S): 20 CAPSULE ORAL at 08:23

## 2020-06-15 RX ADMIN — OXYCODONE AND ACETAMINOPHEN 1 TABLET(S): 5; 325 TABLET ORAL at 06:25

## 2020-06-15 RX ADMIN — Medication 100 MILLIGRAM(S): at 08:23

## 2020-06-15 RX ADMIN — QUETIAPINE FUMARATE 50 MILLIGRAM(S): 200 TABLET, FILM COATED ORAL at 09:15

## 2020-06-15 RX ADMIN — Medication 1 TABLET(S): at 08:24

## 2020-06-15 RX ADMIN — Medication 1 MILLIGRAM(S): at 03:55

## 2020-06-15 RX ADMIN — Medication 1 MILLIGRAM(S): at 10:15

## 2020-06-15 RX ADMIN — ALBUTEROL 2 PUFF(S): 90 AEROSOL, METERED ORAL at 03:15

## 2020-06-15 RX ADMIN — BUDESONIDE AND FORMOTEROL FUMARATE DIHYDRATE 2 PUFF(S): 160; 4.5 AEROSOL RESPIRATORY (INHALATION) at 08:22

## 2020-06-15 RX ADMIN — Medication 30 MILLIGRAM(S): at 08:24

## 2020-06-17 ENCOUNTER — EMERGENCY (EMERGENCY)
Facility: HOSPITAL | Age: 55
LOS: 1 days | Discharge: HOME | End: 2020-06-17
Attending: EMERGENCY MEDICINE
Payer: COMMERCIAL

## 2020-06-17 VITALS — WEIGHT: 207.01 LBS | HEIGHT: 74 IN

## 2020-06-17 DIAGNOSIS — Z90.49 ACQUIRED ABSENCE OF OTHER SPECIFIED PARTS OF DIGESTIVE TRACT: Chronic | ICD-10-CM

## 2020-06-17 PROCEDURE — 99285 EMERGENCY DEPT VISIT HI MDM: CPT | Mod: 25

## 2020-06-17 PROCEDURE — 99283 EMERGENCY DEPT VISIT LOW MDM: CPT

## 2020-06-17 PROCEDURE — 11720 DEBRIDE NAIL 1-5: CPT

## 2020-06-17 NOTE — ED PROVIDER NOTE - OBJECTIVE STATEMENT
53 y/o M patient with a significant PMHx of DM, HTN, COPD with asthma presents to the ED for left toe pain. Patient states 2/3 of nail came off 1-2 weeks ago and was wrapped up by psych. Patient did not follow up with podiatry. Patient denies any fever, chills, any other complaints.

## 2020-06-17 NOTE — CONSULT NOTE ADULT - SUBJECTIVE AND OBJECTIVE BOX
S :   54y year old Male seen in the Treatment room for Left foot painful thick, dystrophic, left hallux nail.  Patient states that he "woke up and the nail was loose".  He denies any treatment for it. He denies seeing a podiatrist.  He states he has a primary care provider that manages his other health issues.  Unable to obtain more history from the patient.  Discussed that he should follow up with us in our clinic or a podiatrist of his choice for routine diabetic foot care.        Patient admits to  (-) Fevers, (-) Chills, (-) Nausea, (-) Vomiting, (-) Shortness of Breath      PMH: Schizo affective schizophrenia  COPD with asthma  Obstructive sleep apnea  Schizoaffective disorder, bipolar type  Diabetes  Hypertension, unspecified type  Paranoia  Uncomplicated asthma, unspecified asthma severity    PSH:S/P appendectomy      Allergies:Haldol (Unknown)  Thorazine (Unknown)      Labs:      WBC Trend  5.94 Date (06-12 @ 08:37)  7.40 Date (06-06 @ 00:40)  7.16 Date (06-04 @ 14:28)      Chem              T(F): 98 (06-17-20 @ 16:35), Max: 98.2 (06-17-20 @ 14:51)  HR: 95 (06-17-20 @ 16:35) (95 - 104)  BP: 122/78 (06-17-20 @ 16:35) (122/78 - 125/87)  RR: 17 (06-17-20 @ 16:35) (16 - 17)  SpO2: 99% (06-17-20 @ 16:35) (99% - 99%)  Wt(kg): --    O:   Integument:  Skin warm, dry and supple bilateral.  No open lesions or inter-digital macerations noted bilateral. No drainage from Left hallux  Toenails 1-5 Right and Left feet thickened, elongated, discolored, and dystrophic with subungual debris.  Toenail 1 on the left foot, loose, mostly detached with slight attachment at the lateral proximal border.    Vascular: Dorsalis Pedis and Posterior Tibial pulses 2/4.  Capillary re-fill time less than 3 seconds digits 1-5 bilateral.   Neuro: Protective sensation intact to the level of the digits bilateral.  MSK: Muscle strength 5/5 all major muscle groups bilateral. No structural abnormality, bilaterally      A:   1. Onychomycosis digits 1-5 Bilateral  2. Pain from Elongated nails  3. Callous left 2nd toe    P: Discussed diagnosis and treatment with patient  Aseptic debridement of nails 1 and 2 on the left foot, no open lesions, no signs of infection.  Discussed importance of daily foot examinations and proper shoe gear  Patient to follow up at [address] [place] [address] Podiatry clinic for diabetic foot care.  Discussed with Attending Dr. Prado

## 2020-06-17 NOTE — ED PROVIDER NOTE - NSFOLLOWUPCLINICS_GEN_ALL_ED_FT
[facility]  Podiatry/Wound Care  [address] [place] [address]  Phone: [phone]  Fax: [phone]  Follow Up Time:

## 2020-06-17 NOTE — ED PROVIDER NOTE - PATIENT PORTAL LINK FT
You can access the FollowMyHealth Patient Portal offered by [facility] by registering at the following website: [email or web address]. By joining [employer]’s FollowMyHealth portal, you will also be able to view your health information using other applications (apps) compatible with our system.

## 2020-06-18 ENCOUNTER — EMERGENCY (EMERGENCY)
Facility: HOSPITAL | Age: 55
LOS: 1 days | Discharge: HOME | End: 2020-06-18
Attending: EMERGENCY MEDICINE
Payer: COMMERCIAL

## 2020-06-18 VITALS — HEIGHT: 74 IN | WEIGHT: 207.01 LBS

## 2020-06-18 DIAGNOSIS — Z90.49 ACQUIRED ABSENCE OF OTHER SPECIFIED PARTS OF DIGESTIVE TRACT: Chronic | ICD-10-CM

## 2020-06-18 LAB
ALBUMIN SERPL ELPH-MCNC: 4 G/DL — SIGNIFICANT CHANGE UP (ref 3.5–5)
ALP SERPL-CCNC: 125 U/L — HIGH (ref 40–120)
ALT FLD-CCNC: 42 U/L DA — SIGNIFICANT CHANGE UP (ref 10–60)
ANION GAP SERPL CALC-SCNC: 10 MMOL/L — SIGNIFICANT CHANGE UP (ref 5–17)
APAP SERPL-MCNC: <2 UG/ML — LOW (ref 10–30)
AST SERPL-CCNC: 30 U/L — SIGNIFICANT CHANGE UP (ref 10–40)
BASOPHILS # BLD AUTO: 0.05 K/UL — SIGNIFICANT CHANGE UP (ref 0–0.2)
BASOPHILS NFR BLD AUTO: 0.6 % — SIGNIFICANT CHANGE UP (ref 0–2)
BILIRUB SERPL-MCNC: 0.5 MG/DL — SIGNIFICANT CHANGE UP (ref 0.2–1.2)
BUN SERPL-MCNC: 24 MG/DL — HIGH (ref 7–18)
CALCIUM SERPL-MCNC: 10.4 MG/DL — SIGNIFICANT CHANGE UP (ref 8.4–10.5)
CHLORIDE SERPL-SCNC: 106 MMOL/L — SIGNIFICANT CHANGE UP (ref 96–108)
CO2 SERPL-SCNC: 22 MMOL/L — SIGNIFICANT CHANGE UP (ref 22–31)
CREAT SERPL-MCNC: 1.08 MG/DL — SIGNIFICANT CHANGE UP (ref 0.5–1.3)
EOSINOPHIL # BLD AUTO: 0.16 K/UL — SIGNIFICANT CHANGE UP (ref 0–0.5)
EOSINOPHIL NFR BLD AUTO: 1.8 % — SIGNIFICANT CHANGE UP (ref 0–6)
ETHANOL SERPL-MCNC: <3 MG/DL — SIGNIFICANT CHANGE UP (ref 0–10)
GLUCOSE SERPL-MCNC: 91 MG/DL — SIGNIFICANT CHANGE UP (ref 70–99)
HCT VFR BLD CALC: 42.8 % — SIGNIFICANT CHANGE UP (ref 39–50)
HGB BLD-MCNC: 14.5 G/DL — SIGNIFICANT CHANGE UP (ref 13–17)
IMM GRANULOCYTES NFR BLD AUTO: 0.3 % — SIGNIFICANT CHANGE UP (ref 0–1.5)
LYMPHOCYTES # BLD AUTO: 2.81 K/UL — SIGNIFICANT CHANGE UP (ref 1–3.3)
LYMPHOCYTES # BLD AUTO: 31.9 % — SIGNIFICANT CHANGE UP (ref 13–44)
MCHC RBC-ENTMCNC: 30.1 PG — SIGNIFICANT CHANGE UP (ref 27–34)
MCHC RBC-ENTMCNC: 33.9 GM/DL — SIGNIFICANT CHANGE UP (ref 32–36)
MCV RBC AUTO: 89 FL — SIGNIFICANT CHANGE UP (ref 80–100)
MONOCYTES # BLD AUTO: 0.75 K/UL — SIGNIFICANT CHANGE UP (ref 0–0.9)
MONOCYTES NFR BLD AUTO: 8.5 % — SIGNIFICANT CHANGE UP (ref 2–14)
NEUTROPHILS # BLD AUTO: 5.02 K/UL — SIGNIFICANT CHANGE UP (ref 1.8–7.4)
NEUTROPHILS NFR BLD AUTO: 56.9 % — SIGNIFICANT CHANGE UP (ref 43–77)
NRBC # BLD: 0 /100 WBCS — SIGNIFICANT CHANGE UP (ref 0–0)
PLATELET # BLD AUTO: 228 K/UL — SIGNIFICANT CHANGE UP (ref 150–400)
POTASSIUM SERPL-MCNC: 3.8 MMOL/L — SIGNIFICANT CHANGE UP (ref 3.5–5.3)
POTASSIUM SERPL-SCNC: 3.8 MMOL/L — SIGNIFICANT CHANGE UP (ref 3.5–5.3)
PROT SERPL-MCNC: 7.8 G/DL — SIGNIFICANT CHANGE UP (ref 6–8.3)
RBC # BLD: 4.81 M/UL — SIGNIFICANT CHANGE UP (ref 4.2–5.8)
RBC # FLD: 13.6 % — SIGNIFICANT CHANGE UP (ref 10.3–14.5)
SALICYLATES SERPL-MCNC: <1.7 MG/DL — LOW (ref 2.8–20)
SODIUM SERPL-SCNC: 138 MMOL/L — SIGNIFICANT CHANGE UP (ref 135–145)
WBC # BLD: 8.82 K/UL — SIGNIFICANT CHANGE UP (ref 3.8–10.5)
WBC # FLD AUTO: 8.82 K/UL — SIGNIFICANT CHANGE UP (ref 3.8–10.5)

## 2020-06-18 PROCEDURE — 99285 EMERGENCY DEPT VISIT HI MDM: CPT

## 2020-06-18 NOTE — ED PROVIDER NOTE - PHYSICAL EXAMINATION
Afebrile, hemodynamically stable, saturating well  NAD, well appearing  Head NCAT  EOMI grossly, anicteric  MMM  RRR, nml S1/S2, no m/r/g  Lungs CTAB, no w/r/r  Abd soft, NT, ND, nml BS, no rebound or guarding  AAO, CN's 3-12 grossly intact  HILL spontaneously, no leg cyanosis or edema  Skin warm, well perfused, no rashes or hives

## 2020-06-18 NOTE — ED PROVIDER NOTE - NSFOLLOWUPINSTRUCTIONS_ED_ALL_ED_FT
Follow up with Psychiatrist tomorrow as scheduled/discussed.  Take meds as prescribed by your doctor.  Return to the ER for any concerns.

## 2020-06-18 NOTE — ED ADULT NURSE NOTE - OBJECTIVE STATEMENT
Pt stated he wants to see a psychiatrist after he was discharged from [facility], he sees the Blood's and they are trying to hurt him. Pt stated he tried to cut himself 3or 4 days ago

## 2020-06-18 NOTE — ED PROVIDER NOTE - PATIENT PORTAL LINK FT
You can access the FollowMyHealth Patient Portal offered by [facility] by registering at the following website: [email or web address]. By joining [employer]’s FollowMyHealth portal, you will also be able to view your health information using other applications (apps) compatible with our system.

## 2020-06-18 NOTE — ED PROVIDER NOTE - PROGRESS NOTE DETAILS
D/w Missy of telepsych - will call me back once a psychiatrist available D/w Dr. Vogel of telepsych who will see pt Signed off care to Dr. STUART Beltran who will f/u telepsych consult. [facility]. they are trying to get bed assignment. awaiting coved swab. pt calm and comfortable and asleep. if pt awakens - will give Risperdal 1mg. -Bright Beltran MD- Telepsych reassessement still pending.  I called them and they said they will evaluate patient soon. Lashaun - spoke w Tele[facility], stable for HI. Has appointment w his private Psych tomorrow. SW at bedside, arranged transportation to Shelter Intake in [place]. Will DC

## 2020-06-18 NOTE — ED PROVIDER NOTE - OBJECTIVE STATEMENT
54yoM with h/o HTN, DM, ALEJANDRO, schizophrenia, presents with depression. Reports he thinks his geodon and psych meds are not working as he feels "depressed." States "I just want to die" and "my life is nothing." States feeling present x 1 day. States bought a razor due to his depression and was thinking of cutting himself. Reports past suicide attempts and EMR review shows multiple past psychiatric admissions. States psychiatry appt on Monday. On Geodon, trazadone, fluoxetine (though taking only BID, not TID as told). Denies all other symptoms.

## 2020-06-18 NOTE — ED ADULT TRIAGE NOTE - CHIEF COMPLAINT QUOTE
Pt stated he wants to see a psychiatrist after he was discharged from [facility], he sees the Blood's and they are trying to hurt him. Pt stated he tried to cut himself 3or 4 days ago

## 2020-06-18 NOTE — ED PROVIDER NOTE - NSFOLLOWUPCLINICS_GEN_ALL_ED_FT
[facility]  Psychiatry  [address] [address]  Phone: [phone]  Fax:   Follow Up Time:     [facility]  Psychiatry  [address] [facility] [place] [address]  Phone: [phone]  Fax:   Follow Up Time:     [facility]  Psychiatry  [address] [place] [address]  Phone: [phone]  Fax:   Follow Up Time:     [facility]  Multi Specialty Office  [address] [facility] - [address] [place] [address]  Phone: [phone]  Fax: [phone]  Follow Up Time:

## 2020-06-18 NOTE — ED PROVIDER NOTE - CLINICAL SUMMARY MEDICAL DECISION MAKING FREE TEXT BOX
No infectious s/s's. No e/o head injury. No e/o intoxication. No electrolyte abnormalities. Pt with schizophrenia and past depression with hospitalizations. Telepsych consult pending. Signed off care to  who will f/u telepsych consult. No infectious s/s's. No e/o head injury. No e/o intoxication. No electrolyte abnormalities. Pt with schizophrenia and past depression with hospitalizations. Telepsych consult pending. Signed off care to Dr. STUART Beltran who will f/u telepsych consult.

## 2020-06-18 NOTE — ED ADULT NURSE NOTE - NSIMPLEMENTINTERV_GEN_ALL_ED
Implemented All Fall Risk Interventions:  Gideon to call system. Call bell, personal items and telephone within reach. Instruct patient to call for assistance. Room bathroom lighting operational. Non-slip footwear when patient is off stretcher. Physically safe environment: no spills, clutter or unnecessary equipment. Stretcher in lowest position, wheels locked, appropriate side rails in place. Provide visual cue, wrist band, yellow gown, etc. Monitor gait and stability. Monitor for mental status changes and reorient to person, place, and time. Review medications for side effects contributing to fall risk. Reinforce activity limits and safety measures with patient and family.

## 2020-06-18 NOTE — ED ADULT NURSE NOTE - CHIEF COMPLAINT QUOTE
Pt stated he wants to see a psychiatrist after he was discharged from [facility], he sees the Blood's and they are trying to hurt him. Pt stated he tried to cut himself 3or 4 days ago

## 2020-06-18 NOTE — ED ADULT NURSE REASSESSMENT NOTE - NS ED NURSE REASSESS COMMENT FT1
Pt reassessed, observed sleeping in bed, breathing room air, in no respiratory distress at time of reassessment. On assessment, pt states he bough a razor and has planned to kill himself but never carried it out, also states he sees blood everywhere where he stays on the [address] of [facility], he sees red shirt, red sneakers and so on. Pt ambulates with a cane, right hand #20Ga in place, no meds administered at this time.   Pt on yellow gown and on 1:1 at the moment, has been cooperative with care so far. Nursing monitoring continues.

## 2020-06-19 VITALS
TEMPERATURE: 99 F | SYSTOLIC BLOOD PRESSURE: 177 MMHG | OXYGEN SATURATION: 98 % | RESPIRATION RATE: 18 BRPM | DIASTOLIC BLOOD PRESSURE: 60 MMHG | HEART RATE: 66 BPM

## 2020-06-19 VITALS
OXYGEN SATURATION: 96 % | DIASTOLIC BLOOD PRESSURE: 67 MMHG | HEART RATE: 64 BPM | TEMPERATURE: 98 F | RESPIRATION RATE: 18 BRPM | SYSTOLIC BLOOD PRESSURE: 120 MMHG

## 2020-06-19 DIAGNOSIS — F25.9 SCHIZOAFFECTIVE DISORDER, UNSPECIFIED: ICD-10-CM

## 2020-06-19 LAB
PCP SPEC-MCNC: SIGNIFICANT CHANGE UP
SARS-COV-2 RNA SPEC QL NAA+PROBE: SIGNIFICANT CHANGE UP

## 2020-06-19 PROCEDURE — 80053 COMPREHEN METABOLIC PANEL: CPT

## 2020-06-19 PROCEDURE — 94640 AIRWAY INHALATION TREATMENT: CPT

## 2020-06-19 PROCEDURE — 99285 EMERGENCY DEPT VISIT HI MDM: CPT | Mod: 25

## 2020-06-19 PROCEDURE — 85027 COMPLETE CBC AUTOMATED: CPT

## 2020-06-19 PROCEDURE — 90792 PSYCH DIAG EVAL W/MED SRVCS: CPT | Mod: 95

## 2020-06-19 PROCEDURE — 80307 DRUG TEST PRSMV CHEM ANLYZR: CPT

## 2020-06-19 PROCEDURE — 87635 SARS-COV-2 COVID-19 AMP PRB: CPT

## 2020-06-19 PROCEDURE — 93005 ELECTROCARDIOGRAM TRACING: CPT

## 2020-06-19 PROCEDURE — 36415 COLL VENOUS BLD VENIPUNCTURE: CPT

## 2020-06-19 RX ORDER — BUDESONIDE AND FORMOTEROL FUMARATE DIHYDRATE 160; 4.5 UG/1; UG/1
2 AEROSOL RESPIRATORY (INHALATION)
Refills: 0 | Status: DISCONTINUED | OUTPATIENT
Start: 2020-06-19 | End: 2020-06-22

## 2020-06-19 RX ORDER — BUDESONIDE AND FORMOTEROL FUMARATE DIHYDRATE 160; 4.5 UG/1; UG/1
0 AEROSOL RESPIRATORY (INHALATION)
Qty: 0 | Refills: 0 | DISCHARGE

## 2020-06-19 RX ORDER — GABAPENTIN 400 MG/1
800 CAPSULE ORAL ONCE
Refills: 0 | Status: COMPLETED | OUTPATIENT
Start: 2020-06-19 | End: 2020-06-19

## 2020-06-19 RX ORDER — TIOTROPIUM BROMIDE 18 UG/1
1 CAPSULE ORAL; RESPIRATORY (INHALATION) ONCE
Refills: 0 | Status: COMPLETED | OUTPATIENT
Start: 2020-06-19 | End: 2020-06-19

## 2020-06-19 RX ADMIN — TIOTROPIUM BROMIDE 1 CAPSULE(S): 18 CAPSULE ORAL; RESPIRATORY (INHALATION) at 11:40

## 2020-06-19 RX ADMIN — BUDESONIDE AND FORMOTEROL FUMARATE DIHYDRATE 2 PUFF(S): 160; 4.5 AEROSOL RESPIRATORY (INHALATION) at 11:23

## 2020-06-19 RX ADMIN — GABAPENTIN 800 MILLIGRAM(S): 400 CAPSULE ORAL at 11:09
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
## 2020-06-19 NOTE — ED BEHAVIORAL HEALTH ASSESSMENT NOTE - PAST PSYCHOTROPIC MEDICATION
DISPLAY PLAN FREE TEXT
per chart history:  Geodon 20mg daily, Geodon 80mg BID, Prozac 60mg Daily, Cogentin 1mg Daily, Depakote 1000mg QHS, Lisinopril 5mg Daily, Incruse Ellipta 62.5mg 1 puff QAM
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
## 2020-06-19 NOTE — ED BEHAVIORAL HEALTH ASSESSMENT NOTE - RISK ASSESSMENT
Risk factors chronic- history of legal issues, history of aggression/violence, history of substance use, history of inpatient admissions, history of 1 past suicide attempt in 1991.   acute: cut l. wrist earlier today, acute psychotic symptomatology present; medication non-compliance; unable to engage in safety planning Moderate Acute Suicide Risk Risk factors chronic- history of legal issues, history of aggression/violence, history of substance use, history of inpatient admissions, history of 1 past suicide attempt in 1991.   acute psychotic symptomatology present; medication non-compliance; unable to engage in safety planning

## 2020-06-19 NOTE — ED BEHAVIORAL HEALTH ASSESSMENT NOTE - DESCRIPTION (FIRST USE, LAST USE, QUANTITY, FREQUENCY, DURATION)
2x week- last smoked yesterday history per therapist; none current utox positive for THC history per chart review  none current

## 2020-06-19 NOTE — ED BEHAVIORAL HEALTH ASSESSMENT NOTE - DESCRIPTION
see bh note Per chart: HTN, Asthma, DM2, sleep apnea, COPD per chart history: Patient lives with 3 roommates via [employer]; homeless

## 2020-06-19 NOTE — ED BEHAVIORAL HEALTH NOTE - BEHAVIORAL HEALTH NOTE
PRE-HOSPITAL COURSE:  ===================  SOURCE:  Second-hand information via EMR documentation and primary RN Zabrina.  DETAILS: Patient self-presented to the ED via public transportation with no noted incidents.    ===================  ED COURSE:   SOURCE:  Second-hand information via EMR documentation and primary RN Zabrina.  ARRIVAL:  Patient self-presented to the ED via public transportation with no noted incidents.  BELONGINGS:  Is noted to have a few bags of clothing; nothing of note in belongings.   BEHAVIOR:  Complied with triage protocols – provided blood/urine, allowed staff to wand/collect belongings and changed into a hospital gown without incident, endorsed SI – states he tried to cut himself 3-4 days ago, denies HI, appears tired and has been resting for majority of his stay, speech is at normal rate, clear/audible, good articulation/tone, linear thought content, fair impulse control, fair insight/judgment, reports he sees red blood all over his sneakers and shirt, denies delusions, good eye contact, fair hygiene/grooming, is AXO3, hasn’t eaten a meal as of yet and has been drinking fluids, no aggression or behavioral issues reported, has been resting for majority of his stay.    TREATMENT: No prns, restraints, security interventions or manual holds required.   VISITORS: Is unaccompanied by family or social supports.     ========================  COLLATERAL ATTEMPT  ========================  NAME: Joseph Sandoval  NUMBER: [phone]  RELATIONSHIP: Emergency Contact  RELIABITY: High  COMMENTS: Made an attempt to contact collateral at number provided with no success; a voicemail was left requesting a phone call back. PRE-HOSPITAL COURSE:  ===================  SOURCE:  Second-hand information via EMR documentation and primary RN Zabrina.  DETAILS: Patient self-presented to the ED via public transportation with no noted incidents.    ===================  ED COURSE:   SOURCE:  Second-hand information via EMR documentation and primary RN Zabrina.  ARRIVAL:  Patient self-presented to the ED via public transportation with no noted incidents.  BELONGINGS:  Is noted to have a few bags of clothing; nothing of note in belongings.   BEHAVIOR:  Complied with triage protocols – provided blood/urine, allowed staff to wand/collect belongings and changed into a hospital gown without incident, endorsed SI – states he tried to cut himself 3-4 days ago, denies HI, appears tired and has been resting for majority of his stay, speech is at normal rate, clear/audible, good articulation/tone, linear thought content, fair impulse control, fair insight/judgment, reports he sees red blood all over his sneakers and shirt, denies delusions, good eye contact, fair hygiene/grooming, is AXO3, hasn’t eaten a meal as of yet and has been drinking fluids, no aggression or behavioral issues reported, has been resting for majority of his stay.    TREATMENT: No prns, restraints, security interventions or manual holds required.   VISITORS: Is unaccompanied by family or social supports.     ========================  COLLATERAL ATTEMPT  ========================  NAME: Jospeh Sandoval  NUMBER: [phone]  RELATIONSHIP: Emergency Contact  COMMENTS: Made an attempt to contact collateral at number provided with no success; a voicemail was left requesting a phone call back.

## 2020-06-19 NOTE — ED BEHAVIORAL HEALTH ASSESSMENT NOTE - ACTIVATING EVENTS/STRESSORS
Non-compliant or not receiving treatment Pending incarceration or homelessness/Non-compliant or not receiving treatment
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
## 2020-06-19 NOTE — ED BEHAVIORAL HEALTH ASSESSMENT NOTE - PSYCHIATRIC ISSUES AND PLAN (INCLUDE STANDING AND PRN MEDICATION)
psychosis: Geodon 20 mg daily with food, titrate to effect; PRN: haldol/ativan/benadryl PRNS: haldol 5mg, ativan 2mg, diphenhydramine 50mg, PO/IM, Q6H for Agitation
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
## 2020-06-19 NOTE — ED BEHAVIORAL HEALTH ASSESSMENT NOTE - OTHER PAST PSYCHIATRIC HISTORY (INCLUDE DETAILS REGARDING ONSET, COURSE OF ILLNESS, INPATIENT/OUTPATIENT TREATMENT)
History of Schizoaffective, three inpatient hospitalizations at [facility] - last admission was May, 2019 for 11 days for suicidal ideation and medication non-compliance.  one prior suicide attempt by drinking bleach (medical admission), Possible suicide attempt today, cut l. wrist, unclear due to disorganization.  Attends [employer] program see hpi

## 2020-06-19 NOTE — ED BEHAVIORAL HEALTH ASSESSMENT NOTE - ADDITIONAL DETAILS ALL
see hpi Patient drank bleach in 1991 and ended up hospitalized.  recently cut wrist two weeks ago, endorsed SI on arrival to ED now denying

## 2020-06-19 NOTE — ED ADULT NURSE REASSESSMENT NOTE - NS ED NURSE REASSESS COMMENT FT1
Pt reassessed, observed laying in bed, asleep and breathing room air, in no respiratory distress at time of reassessment. Pt is A&O x3, able to make needs known. On arrival, pt c/o of having been chased by blood seeing red sneakers and red tshirts everywhere on the streets, pt states he is homeless. Pt states he attempted to kill himself, he had purchased a razor blade and planned to cut himself but didn't attempt it yet.  Pt was evaluated by Tele-psych at about 1am, pending disposition, pt remains on yellow gown and 1:1, has been cooperative with care all through shift. NUrsing monitoring continues.

## 2020-07-09 ENCOUNTER — EMERGENCY (EMERGENCY)
Facility: HOSPITAL | Age: 55
LOS: 1 days | Discharge: HOME | End: 2020-07-09
Attending: EMERGENCY MEDICINE
Payer: COMMERCIAL

## 2020-07-09 VITALS
RESPIRATION RATE: 17 BRPM | OXYGEN SATURATION: 99 % | HEIGHT: 74 IN | TEMPERATURE: 99 F | DIASTOLIC BLOOD PRESSURE: 86 MMHG | WEIGHT: 207.01 LBS | HEART RATE: 72 BPM | SYSTOLIC BLOOD PRESSURE: 143 MMHG

## 2020-07-09 VITALS
SYSTOLIC BLOOD PRESSURE: 132 MMHG | RESPIRATION RATE: 18 BRPM | OXYGEN SATURATION: 98 % | HEART RATE: 77 BPM | TEMPERATURE: 98 F | DIASTOLIC BLOOD PRESSURE: 81 MMHG

## 2020-07-09 DIAGNOSIS — Z90.49 ACQUIRED ABSENCE OF OTHER SPECIFIED PARTS OF DIGESTIVE TRACT: Chronic | ICD-10-CM

## 2020-07-09 PROCEDURE — 71045 X-RAY EXAM CHEST 1 VIEW: CPT | Mod: 26

## 2020-07-09 PROCEDURE — 70450 CT HEAD/BRAIN W/O DYE: CPT | Mod: 26

## 2020-07-09 PROCEDURE — 71045 X-RAY EXAM CHEST 1 VIEW: CPT

## 2020-07-09 PROCEDURE — 96372 THER/PROPH/DIAG INJ SC/IM: CPT

## 2020-07-09 PROCEDURE — 72170 X-RAY EXAM OF PELVIS: CPT

## 2020-07-09 PROCEDURE — 72170 X-RAY EXAM OF PELVIS: CPT | Mod: 26

## 2020-07-09 PROCEDURE — 99284 EMERGENCY DEPT VISIT MOD MDM: CPT | Mod: 25

## 2020-07-09 PROCEDURE — 70486 CT MAXILLOFACIAL W/O DYE: CPT | Mod: 26

## 2020-07-09 PROCEDURE — 70486 CT MAXILLOFACIAL W/O DYE: CPT

## 2020-07-09 PROCEDURE — 70450 CT HEAD/BRAIN W/O DYE: CPT

## 2020-07-09 PROCEDURE — 99284 EMERGENCY DEPT VISIT MOD MDM: CPT

## 2020-07-09 RX ORDER — KETOROLAC TROMETHAMINE 30 MG/ML
60 SYRINGE (ML) INJECTION ONCE
Refills: 0 | Status: DISCONTINUED | OUTPATIENT
Start: 2020-07-09 | End: 2020-07-09

## 2020-07-09 RX ADMIN — Medication 60 MILLIGRAM(S): at 14:41

## 2020-07-09 NOTE — ED PROVIDER NOTE - NS ED MD EM SELECTION
Pre-operative device form faxed to [phone] for patient procedure on 11/25/19.  
Received a device interrogation form to be completed.  Gave to triage.  
[id number] Detailed
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
## 2020-07-09 NOTE — ED PROVIDER NOTE - PROGRESS NOTE DETAILS
Imaging negative for acute traumatic injuries   Repeat neuro exam wnl. Pain controlled. Tolerating PO. Will dc. Discussed indications for patient return to ED. Patient understood.

## 2020-07-09 NOTE — CHART NOTE - NSCHARTNOTEFT_GEN_A_CORE
Referral for Homelessness  Pt is a 55 year old male with  pmhx of schizophrenia, DM, COPD, ALEJANDRO, herniated discs, sciatica . Pt came to the ED for  generalized back pain.  Marcus met with Pt at bedside re referral , Pt was alert and orientedx3. Role of marcus explained. Pt was provided with supportive counseling and a referral to [facility].  He was also provided with [address] a place where he could go to have  a shower ,eat and do his  laundry.  Pt verbalized appreciation. Pt was socially cleared for d/c. Physician made aware.
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
## 2020-07-09 NOTE — ED PROVIDER NOTE - PHYSICAL EXAMINATION
GENERAL: well appearing, no acute distress   HEAD: atraumatic   EYES: EOMI, pink conjunctiva   ENT: moist oral mucosa   CARDIAC: RRR, no edema, distal pulses present   RESPIRATORY: lungs CTAB, no increased work of breathing   GASTROINTESTINAL: no abdominal tenderness, no rebound or guarding, bowel sounds presents  GENITOURINARY: no CVA tenderness   MUSCULOSKELETAL: no deformity; no back ttp  NEUROLOGICAL: AAOx3, CN's II-XII intact, strength 5/5 bilateral UE and LE, sensation intact to light touch, finger to nose intact  SKIN: intact   PSYCHIATRIC: cooperative  HEME LYMPH: no lymphadenopathy

## 2020-07-09 NOTE — ED PROVIDER NOTE - PATIENT PORTAL LINK FT
You can access the FollowMyHealth Patient Portal offered by [facility] by registering at the following website: [email or web address]. By joining [facility]’s FollowMyHealth portal, you will also be able to view your health information using other applications (apps) compatible with our system.

## 2020-07-09 NOTE — ED PROVIDER NOTE - NSFOLLOWUPINSTRUCTIONS_ED_ALL_ED_FT
What You Need to Know About Chronic Back Pain  Long-term (chronic) back pain is back pain that lasts for 12 weeks or longer. It often affects the lower back and can range from mild to severe. Many people have back pain at some point in their lives. It can feel different to each person. It may feel like a muscle ache or a sharp, stabbing pain. The pain often gets worse over time. It can be difficult to find the cause of chronic back pain.  Treating chronic back pain often starts with rest and pain relief, followed by exercises (physical therapy) to strengthen the muscles that support your back. You may have to try different things to see what works best for you. If other treatments do not help, or if your pain is caused by a condition or an injury, you may need surgery.  How can back pain affect me?  Chronic back pain is uncomfortable and can make it hard to do your usual daily activities. Chronic back pain can:  Cause numbness and tingling.Come and go.Get worse when you are sitting, standing, walking, bending, or lifting.Affect you while you are active, at rest, or both.Eventually make it hard to move around.Occur with fever, weight loss, or difficulty urinating.What are the benefits of treating back pain?  Treating chronic back pain may:  Relieve pain.Keep your pain from getting worse.Make it easier for you to do your usual activities.What are some steps I can take to decrease my back pain?     Take over-the-counter or prescription medicines only as told by your health care provider.If directed, apply heat to the affected area. Use the heat source that your health care provider recommends, such as a moist heat pack or a heating pad.  Place a towel between your skin and the heat source.Leave the heat on for 20–30 minutes.Remove the heat if your skin turns bright red. This is especially important if you are unable to feel pain, heat, or cold. You may have a greater risk of getting burned.If directed, put ice on the affected area:  Put ice in a plastic bag.Place a towel between your skin and the bag.Leave the ice on for 20 minutes, 2–3 times a day.Get regular exercise as told by your health care provider to improve flexibility and strength.Do not smoke.Maintain a healthy weight.When lifting objects:  Keep your feet as far apart as your shoulders (shoulder-width apart) or farther apart.Tighten the muscles in your abdomen.Bend your knees and hips and keep your spine neutral. It is important to lift using the strength of your legs, not your back. Do not lock your knees straight out.Always ask for help to lift heavy or awkward objects.What can happen if my back pain goes untreated?  Untreated back pain can:  Get worse over time.Start to occur more often or at different times, such as when you are resting.Cause posture problems.Make it hard to move around (limit mobility).Where can I get support?  Chronic back pain can be a frustrating condition to manage. It may help to talk with other people who are having a similar experience. Consider joining a support group for people dealing with chronic back pain. Ask your health care provider about support groups in your area. You can also find online and in-person support groups through:  The American Chronic Pain Association: https://theacpa.org/Support-GroupsThe U.S. Pain Foundation: uspainfoundation.org/support-groupsContact a health care provider if:  Your symptoms do not get better or they get worse.You have severe back pain.You have chronic back pain and a fever.You lose weight without trying.You have difficulty urinating.You experience numbness or tingling.You develop new pain after an injury.Summary  Chronic back pain is often treated with rest, pain relief, and physical therapy.Get regular exercise to improve your strength and flexibility.Put heat and ice on the affected areas as directed by your health care provider.Chronic back pain can be challenging to live with. Joining a support group may help you manage your condition.This information is not intended to replace advice given to you by your health care provider. Make sure you discuss any questions you have with your health care provider.    Document Released: 01/01/2017 Document Revised: 11/30/2018 Document Reviewed: 08/26/2017  Elsevier Patient Education © 2020 [employer]

## 2020-11-07 NOTE — ED BEHAVIORAL HEALTH ASSESSMENT NOTE - DIFFERENTIAL
Problem: Safety  Goal: Will remain free from injury  Outcome: PROGRESSING AS EXPECTED  Note: Treaded socks in place, bed in the lowest position, call light and belongings within reach, pt call for assistance appropriately      Problem: Knowledge Deficit  Goal: Knowledge of disease process/condition, treatment plan, diagnostic tests, and medications will improve  Outcome: PROGRESSING AS EXPECTED  Note: Educated on POC, all questions answered, hourly rounding in progress     Problem: Pain Management  Goal: Pain level will decrease to patient's comfort goal  Outcome: PROGRESSING AS EXPECTED  Note: Denies pain at this time, hourly rounding in progress      Mood disorder  adjustment disorder  cannabis use disorder  substance induced mood d/o cannabis use disorder  substance induced mood d/o

## 2020-12-03 NOTE — ED ADULT TRIAGE NOTE - RESPIRATORY RATE (BREATHS/MIN)
Prescription approved per [facility] Refill Protocol.  Cuca MIRELES RN     17

## 2021-07-14 PROCEDURE — 99232 SBSQ HOSP IP/OBS MODERATE 35: CPT

## 2021-07-15 ENCOUNTER — OUTPATIENT (OUTPATIENT)
Dept: OUTPATIENT SERVICES | Facility: HOSPITAL | Age: 56
LOS: 1 days | Discharge: HOME | End: 2021-07-15

## 2021-07-15 ENCOUNTER — OUTPATIENT (OUTPATIENT)
Dept: OUTPATIENT SERVICES | Facility: HOSPITAL | Age: 56
LOS: 1 days | End: 2021-07-15

## 2021-07-15 ENCOUNTER — APPOINTMENT (OUTPATIENT)
Dept: SPEECH THERAPY | Facility: HOSPITAL | Age: 56
End: 2021-07-15

## 2021-07-15 ENCOUNTER — APPOINTMENT (OUTPATIENT)
Dept: RADIOLOGY | Facility: HOSPITAL | Age: 56
End: 2021-07-15
Payer: MEDICARE

## 2021-07-15 DIAGNOSIS — Z90.49 ACQUIRED ABSENCE OF OTHER SPECIFIED PARTS OF DIGESTIVE TRACT: Chronic | ICD-10-CM

## 2021-07-15 DIAGNOSIS — R13.10 DYSPHAGIA, UNSPECIFIED: ICD-10-CM

## 2021-07-15 PROCEDURE — 74230 X-RAY XM SWLNG FUNCJ C+: CPT | Mod: 26

## 2021-07-27 DIAGNOSIS — R13.10 DYSPHAGIA, UNSPECIFIED: ICD-10-CM

## 2021-07-30 NOTE — ED ADULT NURSE NOTE - NS ED NURSE RECORD ANOTHER HT AND WT
Routing refill request to provider for review/approval because:  Scheduled for f/unit(s) 8/19/21    Maryam Stafford RN         No

## 2021-10-15 NOTE — ED BEHAVIORAL HEALTH ASSESSMENT NOTE - COMMENTS ON VIOLENCE RISK/PROTECTIVE FACTORS:
R2 Endocrinology Consult Note    Reason for Consult: Elevated TSH    HISTORY       History Of Present Illness  61 year old female with history of breast cancer diagnosed 7 years ago s/p mastectomy and radiation, metastatic to liver (6/2021) recently treated with Xeloda now presenting with persistent diarrhea. Endocrinology consulted for elevated TSH of 20.19.  Patient reports a remote history of hypothyroidism treated with Shirley Thyroid 10 years ago, treated by her primary physician.  She has not been on any thyroid medications for the past 10 years.  Her mother has hypothyroidism on medications, no other family history.  Of note patient was recently admitted in August for pancreatitis after ERCP and biliary stent placement.  At that time TSH was 16 with a free T4 of 1.2, euthyroid was suspected and the plan was to repeat TSH in 3 to 4 weeks when acute illness resolved.  She now presents with ongoing diarrhea for several weeks.  Endorses several episodes per day and extremely poor p.o. intake over the past week, states she has only been able to keep down small amounts of liquids.  She is extremely weak and fatigued and endorses lower extremity edema and abdominal distention.  She had a paracentesis on 10/7 with cytology negative for malignancy.  3.7 L were removed.  She states she is gaining weight despite not eating.  Denies any cold intolerance, does have exfoliative rash of her hands states that this was a side effect of her Xeloda.  She was discontinued from the Xeloda 1 week ago.  Patient also had an isolated episode of hyperglycemia to 319, she is not on any sliding scale insulin and her sugars improved to 83, of note she was on D5 normal saline which is now stopped and she has no history of type 2 diabetes.  Hemoglobin A1c is pending.  Gastroenterology was consulted, suspect she has diarrhea from chemotherapy induced colitis.      Past Medical History  Past Medical History:   Diagnosis Date   • Breast cancer  (CMS/[facility])         Surgical History  Past Surgical History:   Procedure Laterality Date   • Breast reconstruction      with right breast implant   • Cholecystectomy     • Mastectomy Right         Social History  Social History     Tobacco Use   • Smoking status: Former Smoker   • Smokeless tobacco: Former User   Vaping Use   • Vaping Use: never used   Substance Use Topics   • Alcohol use: Not Currently     Comment: none since 2014   • Drug use: Never       Family History  Family History   Problem Relation Age of Onset   • Atrial Fibrilliation Mother    • Cancer, Prostate Father    • Cancer, Ovarian Sister         Allergies  ALLERGIES:  Hydrocodone-acetaminophen and Iodinated diagnostic agents    Home Meds  Medications Prior to Admission   Medication Sig Dispense Refill   • loperamide (IMODIUM) 2 MG capsule Take 2 mg by mouth 4 times daily as needed for Diarrhea.     • ondansetron (ZOFRAN) 4 MG tablet Take 4 mg by mouth every 4 hours as needed.     • escitalopram (LEXAPRO) 10 MG tablet Take 10 mg by mouth at bedtime.     • diphenoxylate-atropine (LOMOTIL) 2.5-0.025 MG tablet Take 1 tablet by mouth 4 times daily as needed for Diarrhea.     • potassium CHLORIDE (KLOR-CON M) 20 MEQ andres ER tablet Take 20 mEq by mouth 2 times daily.     • omeprazole (PriLOSEC) 40 MG capsule Take 40 mg by mouth daily as needed.         Inpatient Meds  Current Facility-Administered Medications   Medication Dose Route Frequency Provider Last Rate Last Admin   • famotidine (PEPCID) tablet 20 mg  20 mg Oral 2 times per day Dior Lloyd NP   20 mg at 10/15/21 0932   • enoxaparin (LOVENOX) injection 40 mg  40 mg Subcutaneous Daily Jeyson Jamison MD       • furosemide (LASIX INJECT) injection 20 mg  20 mg Intravenous Once Jeyson Jamison MD       • sodium chloride 0.9 % flush bag 25 mL  25 mL Intravenous PRN Dior Lloyd NP       • sodium chloride (PF) 0.9 % injection 2 mL  2 mL Intracatheter 2 times per day Dior Lloyd NP   2 mL  at 10/15/21 0932   • escitalopram (LEXAPRO) tablet 10 mg  10 mg Oral QHS Dior Lloyd NP   10 mg at 10/14/21 2302   • piperacillin-tazobactam (ZOSYN) 3.375 g in sodium chloride 0.9 % 100 mL IVPB  3.375 g Intravenous 3 times per day Dior Lloyd NP 25 mL/hr at 10/15/21 0658 Rate Verify at 10/15/21 0658        Review of Systems    Review of Systems   Constitutional: Positive for fatigue and unexpected weight change (Weight gain). Negative for chills and fever.   Respiratory: Negative for cough and shortness of breath.    Cardiovascular: Positive for leg swelling. Negative for chest pain and palpitations.   Gastrointestinal: Positive for abdominal distention, abdominal pain, diarrhea, nausea and vomiting. Negative for constipation.   Endocrine: Negative for polyuria.   Musculoskeletal: Negative for back pain.   Skin: Negative for pallor and rash.   Neurological: Positive for weakness. Negative for dizziness, light-headedness, numbness and headaches.   Psychiatric/Behavioral: Positive for confusion.   All other systems reviewed and are negative.         OBJECTIVE      VITAL SIGNS:     Vital Last Value 24 Hour Range   Temperature 96.8 °F (36 °C) (10/15/21 0508) Temp  Min: 96.8 °F (36 °C)  Max: 98.1 °F (36.7 °C)   Pulse 74 (10/15/21 1100) Pulse  Min: 74  Max: 93   Respiratory 16 (10/15/21 0508) Resp  Min: 16  Max: 16   Non-Invasive  Blood Pressure 100/61 (10/15/21 1100) BP  Min: 92/60  Max: 106/73   Pulse Oximetry 98 % (10/15/21 0508) SpO2  Min: 98 %  Max: 99 %         PHYSICAL EXAM    Physical Exam  Vitals and nursing note reviewed.   Constitutional:       General: She is in acute distress.      Appearance: She is normal weight. She is ill-appearing.      Comments: Cachectic.   Eyes:      General: No scleral icterus.  Neck:      Comments: No thyromegaly or nodules  Cardiovascular:      Rate and Rhythm: Normal rate.      Pulses: Normal pulses.      Heart sounds: No murmur heard.     Pulmonary:      Effort:  Pulmonary effort is normal. No respiratory distress.      Breath sounds: Normal breath sounds. No stridor. No wheezing, rhonchi or rales.   Abdominal:      General: Abdomen is flat. There is distension.      Tenderness: There is no abdominal tenderness. There is no guarding or rebound.   Musculoskeletal:      Right lower leg: Edema (Nonpitting to knee) present.      Left lower leg: Edema (Nonpitting to knee) present.   Skin:     General: Skin is warm and dry.      Comments: Exfoliative rash to fingertips   Neurological:      General: No focal deficit present.      Mental Status: She is alert.          Labs     Recent Labs   Lab 10/15/21  0458 10/14/21  2021   SODIUM 129* 131*   POTASSIUM 4.4 3.2*   CHLORIDE 96* 96*   CO2 28 31   ANIONGAP 9* 7*   BUN 33* 34*   CREATININE 1.06* 1.10*   CALCIUM 7.7* 8.2*   GLUCOSE 319* 94         Recent Labs   Lab 10/15/21  0458 10/14/21  2021   HGB 12.3 13.5   HCT 36.2 39.4    230   WBC 9.0 8.3         Recent Labs   Lab 10/15/21  0458   MG 2.1         Recent Labs   Lab 10/15/21  0458 10/14/21  2021   ALBUMIN 1.8* 2.0*   AST 37 47*       UA  No results found for: UWBC, URBC       Imaging  US VASC PORTAL HEPATIC DUPLEX    (Results Pending)   US VASC LOWER EXTREMITY VENOUS DUPLEX BILATERAL    (Results Pending)   Liver Elastography    (Results Pending)          ASSESSMENT AND PLAN     61 year old female with history of breast cancer diagnosed 7 years ago s/p mastectomy and radiation, metastatic to liver (6/2021) recently treated with Xeloda now presenting with persistent diarrhea. Endocrinology consulted for elevated TSH of 20.19.     Impression:  -Abnormal thyroid function tests, elevated TSH with normal free T4 concern for euthyroid sick syndrome vs subclinical hypothyroidism  -Remote history of hypothyroidism treated by her primary 10 years ago  -Diarrhea, likely secondary to Xeloda induced colitis  -Metastatic breast cancer to the liver  -Ascites and pleural effusions  -Recent  ERCP pancreatitis after biliary stent placement    Plan:  -Patient has risk factors for hypothyroidism including family history (mother) and breast radiation in 2015, can see hypothyroidism 2-3 years after radiation  -TPO antibody to rule out Hashimoto's  -Could represent euthyroid sick syndrome as this would correlate with elevated TSH phase and fits timeframe of recent hospitalization  -We will treat if TPO antibodies are positive, could consider treatment with Synthroid even if negative considering her TSH is greater than 10 and she is having severe fatigue  -Would not initiate synthroid at this point given acute illness  -Follow-up A1c    Discussed with attending, Dr. Lawrence. Please see attending physician note for final recommendations.     Cristino Tabor, DO  Internal Medicine PGY-2      ATTENDING ADDENDUM:    I interviewed and examined the patient independently and discussed the patient with Dr. Tabor and agree with all of the resident's findings with the following additions:     Pleasant 61-year-old female with breast cancer found to have abnormal thyroid function tests.  TSH was elevated at 20.19 with a normal free T4 of 1.3 (0.8-1.5).  The patient's mother has hypothyroidism.  The patient had radiation for breast cancer approximately 4 years ago.    On exam she does not have a goiter.    Impression: Subclinical hypothyroidism    Her diagnosis is most consistent with subclinical hypothyroidism.  The other differential diagnosis would be recovery from sick euthyroid.  She states she was quite sick about 2 months ago.  In recovery of sick euthyroid, the TSH can get as high as 25 a couple months after sick euthyroid.  Unfortunately, we do not have thyroid function tests when she was very sick.      Alternatively, she very well could have subclinical hypothyroidism given her family history.  Additionally she is a few years out from radiation therapy for breast cancer.  Those who have exposure of  radiation to the neck can develop hypothyroidism/subclinical hypothyroidism on average 3 years thereafter.    Check thyroid antibodies to see if this may be autoimmune mediated.    If so, this would suggest Hashimoto's and I would favor treating with levothyroxine.    Await thyroid antibodies to decide if treatment should be started.    The case was discussed with the patient's son who is at the bedside.    Thank you for involving our service in the care of your patients. Please do not hesitate to call with any questions or concerns.    Abiodun Lawrence MD     see hpi

## 2021-11-02 NOTE — BEHAVIORAL HEALTH ASSESSMENT NOTE - NSBHREFERIPTEAMCONTACT_PSY_A_CORE_FT
Patient was sobbing this AM and was medicated with Vistaril 50 mg at 0803 with good results. Patient is overwhelmed and states she needs to go home. Informed her that she is not ready and needs to be stabilized. The panic like reaction subsided. Patient continues to appear depressed. No current hallucinations or delusions observed or reported. Sleep has been a struggle and patient reports bad dreams and flashbacks of seeing her SO hanging . She is avoidant of discussing any thoughts of suicide. \"I don't want to talk about that\". No homicidal thoughts. [id number]

## 2021-11-10 PROBLEM — Z00.00 ENCOUNTER FOR PREVENTIVE HEALTH EXAMINATION: Status: ACTIVE | Noted: 2021-11-10

## 2021-11-14 ENCOUNTER — INPATIENT (INPATIENT)
Facility: HOSPITAL | Age: 56
LOS: 3 days | Discharge: NURSING, REHAB OR LONG-TERM CARE | End: 2021-11-18
Attending: HOSPITALIST | Admitting: HOSPITALIST
Payer: MEDICARE

## 2021-11-14 VITALS — OXYGEN SATURATION: 99 %

## 2021-11-14 DIAGNOSIS — Z90.49 ACQUIRED ABSENCE OF OTHER SPECIFIED PARTS OF DIGESTIVE TRACT: Chronic | ICD-10-CM

## 2021-11-14 LAB
BASE EXCESS BLDV CALC-SCNC: 4.9 MMOL/L — HIGH (ref -2–3)
BASOPHILS # BLD AUTO: 0.03 K/UL — SIGNIFICANT CHANGE UP (ref 0–0.2)
BASOPHILS NFR BLD AUTO: 0.3 % — SIGNIFICANT CHANGE UP (ref 0–2)
BLOOD GAS VENOUS COMPREHENSIVE RESULT: SIGNIFICANT CHANGE UP
CHLORIDE BLDV-SCNC: 98 MMOL/L — SIGNIFICANT CHANGE UP (ref 96–108)
CO2 BLDV-SCNC: 35.3 MMOL/L — HIGH (ref 22–26)
EOSINOPHIL # BLD AUTO: 0.05 K/UL — SIGNIFICANT CHANGE UP (ref 0–0.5)
EOSINOPHIL NFR BLD AUTO: 0.5 % — SIGNIFICANT CHANGE UP (ref 0–6)
GAS PNL BLDV: 132 MMOL/L — LOW (ref 136–145)
GAS PNL BLDV: SIGNIFICANT CHANGE UP
GLUCOSE BLDV-MCNC: 97 MG/DL — SIGNIFICANT CHANGE UP (ref 70–99)
HCO3 BLDV-SCNC: 33 MMOL/L — HIGH (ref 22–29)
HCT VFR BLD CALC: 39.2 % — SIGNIFICANT CHANGE UP (ref 39–50)
HCT VFR BLDA CALC: 33 % — LOW (ref 39–51)
HGB BLD CALC-MCNC: 11.1 G/DL — LOW (ref 13–17)
HGB BLD-MCNC: 12 G/DL — LOW (ref 13–17)
IANC: 7.14 K/UL — SIGNIFICANT CHANGE UP (ref 1.5–8.5)
IMM GRANULOCYTES NFR BLD AUTO: 0.3 % — SIGNIFICANT CHANGE UP (ref 0–1.5)
LACTATE BLDV-MCNC: 1.5 MMOL/L — SIGNIFICANT CHANGE UP (ref 0.5–2)
LYMPHOCYTES # BLD AUTO: 0.93 K/UL — LOW (ref 1–3.3)
LYMPHOCYTES # BLD AUTO: 9.9 % — LOW (ref 13–44)
MCHC RBC-ENTMCNC: 29.7 PG — SIGNIFICANT CHANGE UP (ref 27–34)
MCHC RBC-ENTMCNC: 30.6 GM/DL — LOW (ref 32–36)
MCV RBC AUTO: 97 FL — SIGNIFICANT CHANGE UP (ref 80–100)
MONOCYTES # BLD AUTO: 1.2 K/UL — HIGH (ref 0–0.9)
MONOCYTES NFR BLD AUTO: 12.8 % — SIGNIFICANT CHANGE UP (ref 2–14)
NEUTROPHILS # BLD AUTO: 7.14 K/UL — SIGNIFICANT CHANGE UP (ref 1.8–7.4)
NEUTROPHILS NFR BLD AUTO: 76.2 % — SIGNIFICANT CHANGE UP (ref 43–77)
NRBC # BLD: 0 /100 WBCS — SIGNIFICANT CHANGE UP
NRBC # FLD: 0 K/UL — SIGNIFICANT CHANGE UP
PCO2 BLDV: 69 MMHG — HIGH (ref 42–55)
PH BLDV: 7.29 — LOW (ref 7.32–7.43)
PLATELET # BLD AUTO: 152 K/UL — SIGNIFICANT CHANGE UP (ref 150–400)
PO2 BLDV: 43 MMHG — SIGNIFICANT CHANGE UP
POTASSIUM BLDV-SCNC: 4.2 MMOL/L — SIGNIFICANT CHANGE UP (ref 3.5–5.1)
RBC # BLD: 4.04 M/UL — LOW (ref 4.2–5.8)
RBC # FLD: 14.9 % — HIGH (ref 10.3–14.5)
SAO2 % BLDV: 67.6 % — SIGNIFICANT CHANGE UP
WBC # BLD: 9.38 K/UL — SIGNIFICANT CHANGE UP (ref 3.8–10.5)
WBC # FLD AUTO: 9.38 K/UL — SIGNIFICANT CHANGE UP (ref 3.8–10.5)

## 2021-11-14 PROCEDURE — 99291 CRITICAL CARE FIRST HOUR: CPT | Mod: GC

## 2021-11-14 PROCEDURE — 71045 X-RAY EXAM CHEST 1 VIEW: CPT | Mod: 26

## 2021-11-14 RX ORDER — IPRATROPIUM/ALBUTEROL SULFATE 18-103MCG
3 AEROSOL WITH ADAPTER (GRAM) INHALATION
Refills: 0 | Status: COMPLETED | OUTPATIENT
Start: 2021-11-14 | End: 2021-11-14

## 2021-11-14 RX ORDER — MAGNESIUM SULFATE 500 MG/ML
2 VIAL (ML) INJECTION ONCE
Refills: 0 | Status: COMPLETED | OUTPATIENT
Start: 2021-11-14 | End: 2021-11-14

## 2021-11-14 RX ADMIN — Medication 3 MILLILITER(S): at 22:30

## 2021-11-14 RX ADMIN — Medication 3 MILLILITER(S): at 22:45

## 2021-11-14 RX ADMIN — Medication 50 GRAM(S): at 23:03

## 2021-11-14 RX ADMIN — Medication 125 MILLIGRAM(S): at 23:03

## 2021-11-14 RX ADMIN — Medication 3 MILLILITER(S): at 23:00

## 2021-11-14 NOTE — ED ADULT NURSE NOTE - OBJECTIVE STATEMENT
Float RN note- patient arrives to the ED from a nursing home for SOB x 3days. hx COPD. Patient arrives on a nonrebreather 15L. Patient complains of a headache. Patient arousable to questions. Belly soft, nondistended, and nontender.  1+ pitting edema noted to bilateral lower extremities. Pedal pulses palpable bilaterally. 20g IV placed to right hand by Estrellita Frances. Labs obtained. Blood cultures obtained. Patient placed on bipap upon arrival to the ED and nebulizers administered as ordered. Patient medicated as ordered. Ultrasound IV being placed at this time. Dr. Lynne at bedside for evaluation.  Safety maintained. Patient stable upon exiting the room. Estrellita RN note- patient arrives to the ED from a nursing home for SOB x 3days. hx COPD. Patient arrives on a nonrebreather 15L. Patient complains of a headache. Patient arousable to questions - intermittently awake and somnolent. Patient A&Ox4. Belly slightly firm,  slightly distended, and nontender.  1+ pitting edema noted to bilateral lower extremities. Pedal pulses palpable bilaterally. 20g IV placed to right hand by Estrellita Frances. Labs obtained. Blood cultures obtained. Patient placed on bipap upon arrival to the ED and nebulizers administered as ordered. Cardiac monitor in place- sinus rhythm. Patient medicated as ordered. Ultrasound IV being placed at this time. Dr. Lynne at bedside for evaluation.  Safety maintained. Patient stable upon exiting the room. Report given to primary nurse Bety.

## 2021-11-14 NOTE — ED ADULT TRIAGE NOTE - CHIEF COMPLAINT QUOTE
pt arrives as notification for respiratory distress. Pt Spo2 was 85% on room air improved with nonrebreather with EMS en route. Brought directly to room 8

## 2021-11-15 DIAGNOSIS — J44.9 CHRONIC OBSTRUCTIVE PULMONARY DISEASE, UNSPECIFIED: ICD-10-CM

## 2021-11-15 DIAGNOSIS — J96.01 ACUTE RESPIRATORY FAILURE WITH HYPOXIA: ICD-10-CM

## 2021-11-15 DIAGNOSIS — F20.9 SCHIZOPHRENIA, UNSPECIFIED: ICD-10-CM

## 2021-11-15 DIAGNOSIS — Z29.9 ENCOUNTER FOR PROPHYLACTIC MEASURES, UNSPECIFIED: ICD-10-CM

## 2021-11-15 DIAGNOSIS — I50.9 HEART FAILURE, UNSPECIFIED: ICD-10-CM

## 2021-11-15 DIAGNOSIS — B97.4 RESPIRATORY SYNCYTIAL VIRUS AS THE CAUSE OF DISEASES CLASSIFIED ELSEWHERE: ICD-10-CM

## 2021-11-15 DIAGNOSIS — U07.1 COVID-19: ICD-10-CM

## 2021-11-15 LAB
ALBUMIN SERPL ELPH-MCNC: 4.3 G/DL — SIGNIFICANT CHANGE UP (ref 3.3–5)
ALP SERPL-CCNC: 97 U/L — SIGNIFICANT CHANGE UP (ref 40–120)
ALT FLD-CCNC: 69 U/L — HIGH (ref 4–41)
ANION GAP SERPL CALC-SCNC: 9 MMOL/L — SIGNIFICANT CHANGE UP (ref 7–14)
ANION GAP SERPL CALC-SCNC: 9 MMOL/L — SIGNIFICANT CHANGE UP (ref 7–14)
APTT BLD: 30.5 SEC — SIGNIFICANT CHANGE UP (ref 27–36.3)
AST SERPL-CCNC: 47 U/L — HIGH (ref 4–40)
B PERT DNA SPEC QL NAA+PROBE: SIGNIFICANT CHANGE UP
B PERT+PARAPERT DNA PNL SPEC NAA+PROBE: SIGNIFICANT CHANGE UP
BASE EXCESS BLDA CALC-SCNC: 6.1 MMOL/L — HIGH (ref -2–3)
BASE EXCESS BLDV CALC-SCNC: 6.9 MMOL/L — HIGH (ref -2–3)
BILIRUB SERPL-MCNC: 0.2 MG/DL — SIGNIFICANT CHANGE UP (ref 0.2–1.2)
BLD GP AB SCN SERPL QL: NEGATIVE — SIGNIFICANT CHANGE UP
BLOOD GAS VENOUS COMPREHENSIVE RESULT: SIGNIFICANT CHANGE UP
BLOOD GAS VENOUS COMPREHENSIVE RESULT: SIGNIFICANT CHANGE UP
BORDETELLA PARAPERTUSSIS (RAPRVP): SIGNIFICANT CHANGE UP
BUN SERPL-MCNC: 21 MG/DL — SIGNIFICANT CHANGE UP (ref 7–23)
BUN SERPL-MCNC: 24 MG/DL — HIGH (ref 7–23)
C PNEUM DNA SPEC QL NAA+PROBE: SIGNIFICANT CHANGE UP
CALCIUM SERPL-MCNC: 10.2 MG/DL — SIGNIFICANT CHANGE UP (ref 8.4–10.5)
CALCIUM SERPL-MCNC: 10.4 MG/DL — SIGNIFICANT CHANGE UP (ref 8.4–10.5)
CHLORIDE BLDV-SCNC: 99 MMOL/L — SIGNIFICANT CHANGE UP (ref 96–108)
CHLORIDE SERPL-SCNC: 96 MMOL/L — LOW (ref 98–107)
CHLORIDE SERPL-SCNC: 97 MMOL/L — LOW (ref 98–107)
CO2 BLDA-SCNC: 33 MMOL/L — HIGH (ref 19–24)
CO2 BLDV-SCNC: 35.4 MMOL/L — HIGH (ref 22–26)
CO2 SERPL-SCNC: 29 MMOL/L — SIGNIFICANT CHANGE UP (ref 22–31)
CO2 SERPL-SCNC: 30 MMOL/L — SIGNIFICANT CHANGE UP (ref 22–31)
CREAT SERPL-MCNC: 0.74 MG/DL — SIGNIFICANT CHANGE UP (ref 0.5–1.3)
CREAT SERPL-MCNC: 0.88 MG/DL — SIGNIFICANT CHANGE UP (ref 0.5–1.3)
D DIMER BLD IA.RAPID-MCNC: 183 NG/ML DDU — SIGNIFICANT CHANGE UP
FLUAV SUBTYP SPEC NAA+PROBE: SIGNIFICANT CHANGE UP
FLUBV RNA SPEC QL NAA+PROBE: SIGNIFICANT CHANGE UP
GAS PNL BLDA: SIGNIFICANT CHANGE UP
GAS PNL BLDV: 134 MMOL/L — LOW (ref 136–145)
GAS PNL BLDV: SIGNIFICANT CHANGE UP
GLUCOSE BLDC GLUCOMTR-MCNC: 108 MG/DL — HIGH (ref 70–99)
GLUCOSE BLDC GLUCOMTR-MCNC: 111 MG/DL — HIGH (ref 70–99)
GLUCOSE BLDC GLUCOMTR-MCNC: 122 MG/DL — HIGH (ref 70–99)
GLUCOSE BLDC GLUCOMTR-MCNC: 174 MG/DL — HIGH (ref 70–99)
GLUCOSE BLDV-MCNC: 155 MG/DL — HIGH (ref 70–99)
GLUCOSE SERPL-MCNC: 129 MG/DL — HIGH (ref 70–99)
GLUCOSE SERPL-MCNC: 98 MG/DL — SIGNIFICANT CHANGE UP (ref 70–99)
HADV DNA SPEC QL NAA+PROBE: SIGNIFICANT CHANGE UP
HCO3 BLDA-SCNC: 32 MMOL/L — HIGH (ref 21–28)
HCO3 BLDV-SCNC: 34 MMOL/L — HIGH (ref 22–29)
HCOV 229E RNA SPEC QL NAA+PROBE: SIGNIFICANT CHANGE UP
HCOV HKU1 RNA SPEC QL NAA+PROBE: SIGNIFICANT CHANGE UP
HCOV NL63 RNA SPEC QL NAA+PROBE: SIGNIFICANT CHANGE UP
HCOV OC43 RNA SPEC QL NAA+PROBE: SIGNIFICANT CHANGE UP
HCT VFR BLD CALC: 39.8 % — SIGNIFICANT CHANGE UP (ref 39–50)
HCT VFR BLDA CALC: 37 % — LOW (ref 39–51)
HGB BLD CALC-MCNC: 12.4 G/DL — LOW (ref 13–17)
HGB BLD-MCNC: 12.6 G/DL — LOW (ref 13–17)
HMPV RNA SPEC QL NAA+PROBE: SIGNIFICANT CHANGE UP
HPIV1 RNA SPEC QL NAA+PROBE: SIGNIFICANT CHANGE UP
HPIV2 RNA SPEC QL NAA+PROBE: SIGNIFICANT CHANGE UP
HPIV3 RNA SPEC QL NAA+PROBE: SIGNIFICANT CHANGE UP
HPIV4 RNA SPEC QL NAA+PROBE: SIGNIFICANT CHANGE UP
INR BLD: 1.13 RATIO — SIGNIFICANT CHANGE UP (ref 0.88–1.16)
LACTATE BLDV-MCNC: 2.2 MMOL/L — HIGH (ref 0.5–2)
M PNEUMO DNA SPEC QL NAA+PROBE: SIGNIFICANT CHANGE UP
MAGNESIUM SERPL-MCNC: 2.1 MG/DL — SIGNIFICANT CHANGE UP (ref 1.6–2.6)
MCHC RBC-ENTMCNC: 29.4 PG — SIGNIFICANT CHANGE UP (ref 27–34)
MCHC RBC-ENTMCNC: 31.7 GM/DL — LOW (ref 32–36)
MCV RBC AUTO: 93 FL — SIGNIFICANT CHANGE UP (ref 80–100)
NRBC # BLD: 0 /100 WBCS — SIGNIFICANT CHANGE UP
NRBC # FLD: 0 K/UL — SIGNIFICANT CHANGE UP
PCO2 BLDA: 49 MMHG — HIGH (ref 35–48)
PCO2 BLDV: 57 MMHG — HIGH (ref 42–55)
PH BLDA: 7.42 — SIGNIFICANT CHANGE UP (ref 7.35–7.45)
PH BLDV: 7.38 — SIGNIFICANT CHANGE UP (ref 7.32–7.43)
PLATELET # BLD AUTO: 160 K/UL — SIGNIFICANT CHANGE UP (ref 150–400)
PO2 BLDA: 188 MMHG — HIGH (ref 83–108)
PO2 BLDV: 136 MMHG — SIGNIFICANT CHANGE UP
POTASSIUM BLDV-SCNC: 4.8 MMOL/L — SIGNIFICANT CHANGE UP (ref 3.5–5.1)
POTASSIUM SERPL-MCNC: 4.5 MMOL/L — SIGNIFICANT CHANGE UP (ref 3.5–5.3)
POTASSIUM SERPL-MCNC: 4.6 MMOL/L — SIGNIFICANT CHANGE UP (ref 3.5–5.3)
POTASSIUM SERPL-SCNC: 4.5 MMOL/L — SIGNIFICANT CHANGE UP (ref 3.5–5.3)
POTASSIUM SERPL-SCNC: 4.6 MMOL/L — SIGNIFICANT CHANGE UP (ref 3.5–5.3)
PROT SERPL-MCNC: 7.8 G/DL — SIGNIFICANT CHANGE UP (ref 6–8.3)
PROTHROM AB SERPL-ACNC: 12.8 SEC — SIGNIFICANT CHANGE UP (ref 10.6–13.6)
RAPID RVP RESULT: DETECTED
RBC # BLD: 4.28 M/UL — SIGNIFICANT CHANGE UP (ref 4.2–5.8)
RBC # FLD: 14.9 % — HIGH (ref 10.3–14.5)
RH IG SCN BLD-IMP: POSITIVE — SIGNIFICANT CHANGE UP
RSV RNA SPEC QL NAA+PROBE: DETECTED
RV+EV RNA SPEC QL NAA+PROBE: SIGNIFICANT CHANGE UP
SAO2 % BLDA: 98.4 % — HIGH (ref 94–98)
SAO2 % BLDV: 98.5 % — SIGNIFICANT CHANGE UP
SARS-COV-2 RNA SPEC QL NAA+PROBE: DETECTED
SODIUM SERPL-SCNC: 135 MMOL/L — SIGNIFICANT CHANGE UP (ref 135–145)
SODIUM SERPL-SCNC: 135 MMOL/L — SIGNIFICANT CHANGE UP (ref 135–145)
TROPONIN T, HIGH SENSITIVITY RESULT: 18 NG/L — SIGNIFICANT CHANGE UP
WBC # BLD: 9.77 K/UL — SIGNIFICANT CHANGE UP (ref 3.8–10.5)
WBC # FLD AUTO: 9.77 K/UL — SIGNIFICANT CHANGE UP (ref 3.8–10.5)

## 2021-11-15 PROCEDURE — 99223 1ST HOSP IP/OBS HIGH 75: CPT | Mod: GC

## 2021-11-15 RX ORDER — REMDESIVIR 5 MG/ML
200 INJECTION INTRAVENOUS EVERY 24 HOURS
Refills: 0 | Status: COMPLETED | OUTPATIENT
Start: 2021-11-15 | End: 2021-11-15

## 2021-11-15 RX ORDER — ENOXAPARIN SODIUM 100 MG/ML
40 INJECTION SUBCUTANEOUS
Refills: 0 | Status: DISCONTINUED | OUTPATIENT
Start: 2021-11-15 | End: 2021-11-18

## 2021-11-15 RX ORDER — SODIUM CHLORIDE 9 MG/ML
1000 INJECTION, SOLUTION INTRAVENOUS
Refills: 0 | Status: DISCONTINUED | OUTPATIENT
Start: 2021-11-15 | End: 2021-11-18

## 2021-11-15 RX ORDER — DEXTROSE 50 % IN WATER 50 %
25 SYRINGE (ML) INTRAVENOUS ONCE
Refills: 0 | Status: DISCONTINUED | OUTPATIENT
Start: 2021-11-15 | End: 2021-11-18

## 2021-11-15 RX ORDER — ENOXAPARIN SODIUM 100 MG/ML
40 INJECTION SUBCUTANEOUS DAILY
Refills: 0 | Status: DISCONTINUED | OUTPATIENT
Start: 2021-11-15 | End: 2021-11-15

## 2021-11-15 RX ORDER — FUROSEMIDE 40 MG
20 TABLET ORAL
Refills: 0 | Status: DISCONTINUED | OUTPATIENT
Start: 2021-11-15 | End: 2021-11-16

## 2021-11-15 RX ORDER — INSULIN LISPRO 100/ML
VIAL (ML) SUBCUTANEOUS EVERY 6 HOURS
Refills: 0 | Status: DISCONTINUED | OUTPATIENT
Start: 2021-11-15 | End: 2021-11-15

## 2021-11-15 RX ORDER — ALBUTEROL 90 UG/1
2 AEROSOL, METERED ORAL EVERY 4 HOURS
Refills: 0 | Status: DISCONTINUED | OUTPATIENT
Start: 2021-11-15 | End: 2021-11-18

## 2021-11-15 RX ORDER — BUDESONIDE AND FORMOTEROL FUMARATE DIHYDRATE 160; 4.5 UG/1; UG/1
1 AEROSOL RESPIRATORY (INHALATION)
Qty: 0 | Refills: 0 | DISCHARGE

## 2021-11-15 RX ORDER — ACETAMINOPHEN 500 MG
650 TABLET ORAL EVERY 6 HOURS
Refills: 0 | Status: DISCONTINUED | OUTPATIENT
Start: 2021-11-15 | End: 2021-11-18

## 2021-11-15 RX ORDER — DEXTROSE 50 % IN WATER 50 %
12.5 SYRINGE (ML) INTRAVENOUS ONCE
Refills: 0 | Status: DISCONTINUED | OUTPATIENT
Start: 2021-11-15 | End: 2021-11-18

## 2021-11-15 RX ORDER — DEXTROSE 50 % IN WATER 50 %
15 SYRINGE (ML) INTRAVENOUS ONCE
Refills: 0 | Status: DISCONTINUED | OUTPATIENT
Start: 2021-11-15 | End: 2021-11-18

## 2021-11-15 RX ORDER — VALPROIC ACID (AS SODIUM SALT) 250 MG/5ML
250 SOLUTION, ORAL ORAL EVERY 6 HOURS
Refills: 0 | Status: DISCONTINUED | OUTPATIENT
Start: 2021-11-15 | End: 2021-11-16

## 2021-11-15 RX ORDER — IPRATROPIUM/ALBUTEROL SULFATE 18-103MCG
3 AEROSOL WITH ADAPTER (GRAM) INHALATION EVERY 6 HOURS
Refills: 0 | Status: DISCONTINUED | OUTPATIENT
Start: 2021-11-15 | End: 2021-11-17

## 2021-11-15 RX ORDER — INSULIN LISPRO 100/ML
VIAL (ML) SUBCUTANEOUS
Refills: 0 | Status: DISCONTINUED | OUTPATIENT
Start: 2021-11-15 | End: 2021-11-18

## 2021-11-15 RX ORDER — GLUCAGON INJECTION, SOLUTION 0.5 MG/.1ML
1 INJECTION, SOLUTION SUBCUTANEOUS ONCE
Refills: 0 | Status: DISCONTINUED | OUTPATIENT
Start: 2021-11-15 | End: 2021-11-18

## 2021-11-15 RX ORDER — OXYCODONE AND ACETAMINOPHEN 5; 325 MG/1; MG/1
1 TABLET ORAL EVERY 6 HOURS
Refills: 0 | Status: DISCONTINUED | OUTPATIENT
Start: 2021-11-15 | End: 2021-11-18

## 2021-11-15 RX ORDER — REMDESIVIR 5 MG/ML
INJECTION INTRAVENOUS
Refills: 0 | Status: DISCONTINUED | OUTPATIENT
Start: 2021-11-15 | End: 2021-11-15

## 2021-11-15 RX ORDER — PANTOPRAZOLE SODIUM 20 MG/1
40 TABLET, DELAYED RELEASE ORAL DAILY
Refills: 0 | Status: DISCONTINUED | OUTPATIENT
Start: 2021-11-15 | End: 2021-11-16

## 2021-11-15 RX ORDER — DEXAMETHASONE 0.5 MG/5ML
6 ELIXIR ORAL DAILY
Refills: 0 | Status: DISCONTINUED | OUTPATIENT
Start: 2021-11-15 | End: 2021-11-15

## 2021-11-15 RX ADMIN — Medication 20 MILLIGRAM(S): at 17:40

## 2021-11-15 RX ADMIN — Medication 26.25 MILLIGRAM(S): at 06:27

## 2021-11-15 RX ADMIN — Medication 100 MILLIGRAM(S): at 16:43

## 2021-11-15 RX ADMIN — Medication 26.25 MILLIGRAM(S): at 17:39

## 2021-11-15 RX ADMIN — Medication 20 MILLIGRAM(S): at 06:27

## 2021-11-15 RX ADMIN — Medication 2 GRAM(S): at 00:30

## 2021-11-15 RX ADMIN — Medication 26.25 MILLIGRAM(S): at 10:51

## 2021-11-15 RX ADMIN — ENOXAPARIN SODIUM 40 MILLIGRAM(S): 100 INJECTION SUBCUTANEOUS at 05:23

## 2021-11-15 RX ADMIN — OXYCODONE AND ACETAMINOPHEN 1 TABLET(S): 5; 325 TABLET ORAL at 18:34

## 2021-11-15 RX ADMIN — REMDESIVIR 200 MILLIGRAM(S): 5 INJECTION INTRAVENOUS at 05:32

## 2021-11-15 RX ADMIN — Medication 650 MILLIGRAM(S): at 17:39

## 2021-11-15 RX ADMIN — Medication 650 MILLIGRAM(S): at 18:20

## 2021-11-15 RX ADMIN — Medication 6 MILLIGRAM(S): at 05:23

## 2021-11-15 RX ADMIN — ENOXAPARIN SODIUM 40 MILLIGRAM(S): 100 INJECTION SUBCUTANEOUS at 17:39

## 2021-11-15 RX ADMIN — Medication 3 MILLILITER(S): at 22:55

## 2021-11-15 RX ADMIN — Medication 2: at 05:32

## 2021-11-15 RX ADMIN — PANTOPRAZOLE SODIUM 40 MILLIGRAM(S): 20 TABLET, DELAYED RELEASE ORAL at 10:51

## 2021-11-15 RX ADMIN — OXYCODONE AND ACETAMINOPHEN 1 TABLET(S): 5; 325 TABLET ORAL at 19:34

## 2021-11-15 NOTE — ED PROVIDER NOTE - OBJECTIVE STATEMENT
57 yo M pmhx of Parkinson’s, COPD, CHF, opiate dependence, depression, sleep apnea comes to ED w/ SOB x 3 days. Their SOB is severe with 86% saturation seen on room air at the [facility], constant, random onset. Patient was transported from their nursing home facility via EMS to the ED. Patient initially presyncopal and tachypneic w/ increased work of breathing on presentation requiring bipap. After starting bipap treatment fio2 100%, Vt 500ml, rate 20 bpm patient’s status began to improve. Patient normally has CPAP, albuterol, and Advair at home but normally is not on O2. Covid booster on 11/4 (Pfizer#3).

## 2021-11-15 NOTE — ED PROVIDER NOTE - NSICDXPASTMEDICALHX_GEN_ALL_CORE_FT
PAST MEDICAL HISTORY:  COPD with asthma     Diabetes Type 2; pt lost weight, no longer taking meds    Hypertension, unspecified type     Obstructive sleep apnea CPAP for 7-8 years    Paranoia     Schizo affective schizophrenia     Schizoaffective disorder, bipolar type     Uncomplicated asthma, unspecified asthma severity

## 2021-11-15 NOTE — H&P ADULT - NSHPSOCIALHISTORY_GEN_ALL_CORE
Home: Domiciled   ADL: Full ADL   Next of Kin:  Alcohol: Denies  Smoking: Denies   Drugs: Denies Home: Domiciled   ADL: Full ADL   Next of Kin: Brother:   Pt admits to drinking EtOH once or twice a week. Pt drinks 4loko or 2 tall glass of vodka. Pt has been smoking 1ppd since high school. Pt also occasionally smokes marijuana.

## 2021-11-15 NOTE — H&P ADULT - PROBLEM SELECTOR PLAN 5
DVT PPx: Lovenox 40mg BID   Diet: NPO while on AVAPS   Code: Full   Dispo: Pending Hospital Course  Communication: DVT PPx: Lovenox 40mg BID   Diet: NPO while on AVAPS   Code: Full   Dispo: Pending Hospital Course  Communication: Brother Joseph Trinity: [phone] Patient w/ CHF; last echo 2020: 45%   - Clinically hypervolemic; pitting edema   - c/w lasix 20mg IV BID   - Daily Weight, Strict I and Os, BMP QD;p K>4, Mg>2  - HTN: hold losartan 50mg, norvasc 10mg until no longer NPO Baseline PaCO2: 46; now 69     - AVAPS at this time for hypoxic RF; steroids as per covid; azithromycin  - Home advair diskus, will c/w albuterol here Baseline PaCO2: 46; now 69     - AVAPS at this time for hypoxic RF; steroids as per covid; azithromycin  - VB.29/69CO2; appears to be chronic compensation; likely new baseline   - Home advair diskus, will c/w albuterol here
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
## 2021-11-15 NOTE — ED PROVIDER NOTE - CLINICAL SUMMARY MEDICAL DECISION MAKING FREE TEXT BOX
Impression: 57 yo M pmhx of Parkinson’s, COPD, CHF, opiate dependence, depression, sleep apnea comes to ED w/ SOB x 3 days. Their symptoms of SOB, exam findings of expiratory wheezing and low saturation of 86% on room air are concerning for COPD exacerbation.    Ordered labs, imaging, medications for diagnosis, management, and treatment.

## 2021-11-15 NOTE — ED PROVIDER NOTE - PROGRESS NOTE DETAILS
Goyo, PGY3 - Pt reevaluated, continues to be lethargic but much more arousable, intermittently verbal. Initial vbg with pH 7.29, PCO2 69. Has been on AVAPS ~1.5hr, will obtain repeat VBG now. Pulling adequate TVs 400-500. MICU evaluated patient recommended floor admission. Hospitalist paged. Goyo, PGY3 - Pt reevaluated, more alert, stable on AVAPS. Endorsed to hospitalist (states admit to continuous pulse ox floor) & MAR (to eval pt)

## 2021-11-15 NOTE — H&P ADULT - PROBLEM SELECTOR PLAN 3
Pt adm with COPD; Baseline PaCO2:  On L NC at home   - C/w Duonebs, azithromycin, methylprednisolone   - Bipap Qhs   - CTM ABGs Known Hx of schizophrenia with previous suicide attempts   - Receives Invega on the 21st of every month  - depakote 1000mg at night; will convert to IV: 250mg q6h  - Venlafaxine 225mg once able to take PO   - Quetiapine 25mg at night once able to take PO Pt adm w/ hypoxic respiratory failure 2/2 COVID+ and RSV+ desat to 86%;  - Per SNF: COVID + since 10/18 received Monoclonal Ab w/o O2 requirements   - Triple vaccinated; most recent 11/4  - Remdesivir Day 1/5  - Dexamethasone Day 1/10  - Symptomatic Tx: Albuterol, Tesselon pearle, tylenol prn   - q72hr ferritin, PCT, D-Dimer

## 2021-11-15 NOTE — H&P ADULT - PROBLEM SELECTOR PLAN 7
DVT PPx: Lovenox 40mg BID   Diet: NPO while on AVAPS   Code: Full   Dispo: Pending Hospital Course  Communication: HCP: Brother Joseph Dumasnabil: [phone] (attempted 11/15: 5:30AM

## 2021-11-15 NOTE — H&P ADULT - NSHPLABSRESULTS_GEN_ALL_CORE
LABS:                        12.0   9.38  )-----------( 152      ( 14 Nov 2021 23:42 )             39.2     Hgb Trend: 12.0<--  11-14    135  |  97<L>  |  21  ----------------------------<  98  4.6   |  29  |  0.88    Ca    10.4      14 Nov 2021 23:42  Mg     2.10     11-14    TPro  7.8  /  Alb  4.3  /  TBili  0.2  /  DBili  x   /  AST  47<H>  /  ALT  69<H>  /  AlkPhos  97  11-14    Creatinine Trend: 0.88<--  PT/INR - ( 14 Nov 2021 23:42 )   PT: 12.8 sec;   INR: 1.13 ratio         PTT - ( 14 Nov 2021 23:42 )  PTT:30.5 sec                  CAPILLARY BLOOD GLUCOSE    < from: Xray Chest 1 View- PORTABLE-Urgent (11.14.21 @ 23:25) >    IMPRESSION:  Bilateral pleural effusions, right greater than left with adjacent lower lung atelectasis.    < end of copied text >
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
## 2021-11-15 NOTE — H&P ADULT - ATTENDING COMMENTS
Pt with COPD, CHF, parkinsons admitted with respiratory failure with hypoxia and hypercapnia  in the setting of COVID 19 and RSV infections.  Currently on AVAPS  On exam: sleepy/somnolent, but arousable.  No complaints.  Heart RRR, lungs with mild rales  labs show PCO2 elevated, but slightly improved on serial VBG's  Will treat with dexamethasone/remdesivir  ICU note reviewed  Will need pulm f/u today  continue AVAPS for now  bronchodilators  gentle diuresis

## 2021-11-15 NOTE — H&P ADULT - PROBLEM SELECTOR PLAN 2
Pt adm w/ hypoxic respiratory failure 2/2 COVID+ and RSV+ desat to 86%  - Triple vaccinated; most recent beginning of November   - Remdesivir Day 1/5  - Dexamethasone Day 1/10  - Symptomatic Tx: Albuterol, Tesselon pearle, tylenol prn   - q72hr ferritin, PCT, D-Dimer Pt adm w/ hypoxic respiratory failure 2/2 COVID+ and RSV+ desat to 86%;  - Per SNF: COVID + since 10/18 received Monoclonal Ab w/o O2 requirements   - Triple vaccinated; most recent beginning of November   - Remdesivir Day 1/5  - Dexamethasone Day 1/10  - Symptomatic Tx: Albuterol, Tesselon pearle, tylenol prn   - q72hr ferritin, PCT, D-Dimer Pt adm w/ hypoxic respiratory failure 2/2 COVID+ and RSV+ desat to 86%;  - here + for RSV; desat to 86%   - given hx of asx covid infection and triple vaccination, considering larger component of RSV causing hypoxic respiratory failure rather than COVID  - c/w albuterol, tylenol, steroids as below
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
## 2021-11-15 NOTE — H&P ADULT - NSHPREVIEWOFSYSTEMS_GEN_ALL_CORE
REVIEW OF SYSTEMS:  CONSTITUTIONAL: No weakness, fevers, chills, sick contacts, or unintended weight loss  EYES: No visual changes or vertigo  ENT: No throat pain, rhinorrhea, or hearing loss   NECK: No pain or stiffness  RESPIRATORY: No cough, wheezing, hemoptysis; No shortness of breath  CARDIOVASCULAR: No chest pain or palpitations  GASTROINTESTINAL: No abdominal or epigastric pain. No nausea, vomiting, or hematemesis; No diarrhea or constipation. No melena or hematochezia.  GENITOURINARY: No dysuria, frequency or hematuria  NEUROLOGICAL: No numbness or weakness  SKIN: No itching, rashes, or bruises  Psych: Good mood, no substance use ROS limited due to patient inability to answer questions

## 2021-11-15 NOTE — H&P ADULT - PROBLEM SELECTOR PLAN 4
Patient w/ CHF HFpEF vs HFrEF w/ last known EF:   - c/w   - Reported Dry Weight:     Clinically euvolemic/hypervolemic   - Lasix   - Daily Dry Weight, Strict I and Os, BMP QD;p K>4, Mg>2 Baseline PaCO2: 46; now 69     - AVAPS at this time for hypoxic RF; steroids as per covid; azithromycin  - Home advair diskus, will c/w albuterol here Known Hx of schizophrenia with previous suicide attempts   - Receives Invega on the 21st of every month  - depakote 1000mg at night; will convert to IV: 250mg q6h  - Venlafaxine 225mg once able to take PO   - Quetiapine 25mg at night once able to take PO

## 2021-11-15 NOTE — H&P ADULT - NSHPPHYSICALEXAM_GEN_ALL_CORE
Objective:    Vitals: Vital Signs Last 24 Hrs  T(C): 36.3 (11-15-21 @ 04:02), Max: 37 (11-15-21 @ 01:44)  T(F): 97.4 (11-15-21 @ 04:02), Max: 98.6 (11-15-21 @ 01:44)  HR: 97 (11-15-21 @ 04:02) (79 - 97)  BP: 127/64 (11-15-21 @ 04:02) (97/71 - 152/77)  BP(mean): --  RR: 20 (11-15-21 @ 04:02) (17 - 28)  SpO2: 97% (11-15-21 @ 04:02) (86% - 100%)            I&O's Summary      PHYSICAL EXAM:  GENERAL: NAD, well-groomed, well-developed  HEAD:  Atraumatic, Normocephalic  EYES: EOMI, PERRLA, conjunctiva and sclera clear  ENMT: No tonsillar erythema, exudates, or enlargement; Moist mucous membranes, Good dentition, No lesions  NECK: Supple, No JVD, Normal thyroid  CHEST/LUNG: Clear to auscultation bilaterally; No rales, rhonchi, wheezing, or rubs  HEART: Regular rate and rhythm; No murmurs, rubs, or gallops  ABDOMEN: Soft, Nontender, Nondistended; Bowel sounds present  EXTREMITIES:  2+ Peripheral Pulses, No clubbing, cyanosis, or edema  LYMPH: No lymphadenopathy noted  SKIN: No rashes or lesions  NERVOUS SYSTEM:  Alert & Oriented X4, Good concentration  PSYCH: Normal Affect. Speaking in Full Sentences. Laying in bed comfortably; not agitated Objective:    Vitals: Vital Signs Last 24 Hrs  T(C): 36.3 (11-15-21 @ 04:02), Max: 37 (11-15-21 @ 01:44)  T(F): 97.4 (11-15-21 @ 04:02), Max: 98.6 (11-15-21 @ 01:44)  HR: 97 (11-15-21 @ 04:02) (79 - 97)  BP: 127/64 (11-15-21 @ 04:02) (97/71 - 152/77)  BP(mean): --  RR: 20 (11-15-21 @ 04:02) (17 - 28)  SpO2: 97% (11-15-21 @ 04:02) (86% - 100%)            I&O's Summary      PHYSICAL EXAM:  GENERAL: Obese gentleman, on AVAPS, responds to verbal and physical stimuli mildly   HEAD:  Atraumatic, Normocephalic  EYES: EOMI, PERRLA, conjunctiva and sclera clear  NECK: Neck fullness Difficult to appreciate JVD,   CHEST/LUNG: Difficult to appreciated lung sounds b/l 2/2 body habitus    HEART: Difficult to appreciate heart sounds 2/2 body habitus   ABDOMEN: Soft, Nontender, Nondistended; Bowel sounds present  EXTREMITIES:  2+ pitting edema   SKIN: No rashes or lesions; noted tattoos throughout   NERVOUS SYSTEM:  Alert & Oriented X0  PSYCH: Lethargic, on AVAPS

## 2021-11-15 NOTE — ED PROVIDER NOTE - ATTENDING CONTRIBUTION TO CARE
57 yo male with PMH parkinsons, COPD, CHF, opiate dependence, depression, sleep apnea sent to ED from [facility] for evaluation of shortness of breath x 3 days, hypoxic to 86% on room air. Upon arriving at ED, patient with respiratory distress requiring bipap. PE: tachypneic, hypoxic A/P Labs, imaging, medicate, adm

## 2021-11-15 NOTE — ED PROVIDER NOTE - PHYSICAL EXAMINATION
Physical limited due to patient's respiratory condition    General: respiratory distress  HEENT: NCAT, PERRL  Cardiac: RRR, no murmurs, 2+ peripheral pulses  Chest: generalized expiratory wheezing on exam  Abdomen: soft, non-distended, bowel sounds present, no ttp, no rebound or guarding  Extremities: no peripheral edema, calf tenderness, or leg size discrepancies  Skin: no rashes, or actively bleeding wounds.

## 2021-11-15 NOTE — H&P ADULT - PROBLEM SELECTOR PLAN 6
DVT PPx: Lovenox 40mg BID   Diet: NPO while on AVAPS   Code: Full   Dispo: Pending Hospital Course  Communication: Brother Joseph Trinity: [phone] DVT PPx: Lovenox 40mg BID   Diet: NPO while on AVAPS   Code: Full   Dispo: Pending Hospital Course  Communication: HCP: Brother Joseph Dumasnabil: [phone] (attempted 11/15: 5:30AM Patient w/ CHF; last echo 2020: 45%   - Clinically hypervolemic; pitting edema   - c/w lasix 20mg IV BID   - Daily Weight, Strict I and Os, BMP QD;p K>4, Mg>2  - HTN: hold losartan 50mg, norvasc 10mg until no longer NPO

## 2021-11-15 NOTE — ED PROVIDER NOTE - CARE PLAN
1 Principal Discharge DX:	COVID-19  Secondary Diagnosis:	Acute respiratory failure with hypoxia and hypercapnia
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
## 2021-11-16 ENCOUNTER — TRANSCRIPTION ENCOUNTER (OUTPATIENT)
Age: 56
End: 2021-11-16

## 2021-11-16 LAB
A1C WITH ESTIMATED AVERAGE GLUCOSE RESULT: 6.2 % — HIGH (ref 4–5.6)
ANION GAP SERPL CALC-SCNC: 9 MMOL/L — SIGNIFICANT CHANGE UP (ref 7–14)
BUN SERPL-MCNC: 26 MG/DL — HIGH (ref 7–23)
CALCIUM SERPL-MCNC: 10.4 MG/DL — SIGNIFICANT CHANGE UP (ref 8.4–10.5)
CHLORIDE SERPL-SCNC: 95 MMOL/L — LOW (ref 98–107)
CO2 SERPL-SCNC: 33 MMOL/L — HIGH (ref 22–31)
COVID-19 NUCLEOCAPSID GAM AB INTERP: POSITIVE
COVID-19 NUCLEOCAPSID TOTAL GAM ANTIBODY RESULT: 11.2 INDEX — HIGH
COVID-19 SPIKE DOMAIN AB INTERP: POSITIVE
COVID-19 SPIKE DOMAIN ANTIBODY RESULT: >250 U/ML — HIGH
CREAT SERPL-MCNC: 0.83 MG/DL — SIGNIFICANT CHANGE UP (ref 0.5–1.3)
ESTIMATED AVERAGE GLUCOSE: 131 — SIGNIFICANT CHANGE UP
GLUCOSE BLDC GLUCOMTR-MCNC: 101 MG/DL — HIGH (ref 70–99)
GLUCOSE BLDC GLUCOMTR-MCNC: 102 MG/DL — HIGH (ref 70–99)
GLUCOSE BLDC GLUCOMTR-MCNC: 116 MG/DL — HIGH (ref 70–99)
GLUCOSE BLDC GLUCOMTR-MCNC: 96 MG/DL — SIGNIFICANT CHANGE UP (ref 70–99)
GLUCOSE SERPL-MCNC: 98 MG/DL — SIGNIFICANT CHANGE UP (ref 70–99)
HCT VFR BLD CALC: 40.7 % — SIGNIFICANT CHANGE UP (ref 39–50)
HGB BLD-MCNC: 12.8 G/DL — LOW (ref 13–17)
MAGNESIUM SERPL-MCNC: 2.3 MG/DL — SIGNIFICANT CHANGE UP (ref 1.6–2.6)
MCHC RBC-ENTMCNC: 29.6 PG — SIGNIFICANT CHANGE UP (ref 27–34)
MCHC RBC-ENTMCNC: 31.4 GM/DL — LOW (ref 32–36)
MCV RBC AUTO: 94 FL — SIGNIFICANT CHANGE UP (ref 80–100)
NRBC # BLD: 0 /100 WBCS — SIGNIFICANT CHANGE UP
NRBC # FLD: 0 K/UL — SIGNIFICANT CHANGE UP
PHOSPHATE SERPL-MCNC: 2.6 MG/DL — SIGNIFICANT CHANGE UP (ref 2.5–4.5)
PLATELET # BLD AUTO: 180 K/UL — SIGNIFICANT CHANGE UP (ref 150–400)
POTASSIUM SERPL-MCNC: 4.1 MMOL/L — SIGNIFICANT CHANGE UP (ref 3.5–5.3)
POTASSIUM SERPL-SCNC: 4.1 MMOL/L — SIGNIFICANT CHANGE UP (ref 3.5–5.3)
RBC # BLD: 4.33 M/UL — SIGNIFICANT CHANGE UP (ref 4.2–5.8)
RBC # FLD: 14.8 % — HIGH (ref 10.3–14.5)
SARS-COV-2 IGG+IGM SERPL QL IA: 11.2 INDEX — HIGH
SARS-COV-2 IGG+IGM SERPL QL IA: >250 U/ML — HIGH
SARS-COV-2 IGG+IGM SERPL QL IA: POSITIVE
SARS-COV-2 IGG+IGM SERPL QL IA: POSITIVE
SODIUM SERPL-SCNC: 137 MMOL/L — SIGNIFICANT CHANGE UP (ref 135–145)
WBC # BLD: 10.27 K/UL — SIGNIFICANT CHANGE UP (ref 3.8–10.5)
WBC # FLD AUTO: 10.27 K/UL — SIGNIFICANT CHANGE UP (ref 3.8–10.5)

## 2021-11-16 PROCEDURE — 99233 SBSQ HOSP IP/OBS HIGH 50: CPT | Mod: GC

## 2021-11-16 PROCEDURE — 99232 SBSQ HOSP IP/OBS MODERATE 35: CPT

## 2021-11-16 RX ORDER — TAMSULOSIN HYDROCHLORIDE 0.4 MG/1
0.4 CAPSULE ORAL AT BEDTIME
Refills: 0 | Status: DISCONTINUED | OUTPATIENT
Start: 2021-11-16 | End: 2021-11-18

## 2021-11-16 RX ORDER — SENNA PLUS 8.6 MG/1
2 TABLET ORAL AT BEDTIME
Refills: 0 | Status: DISCONTINUED | OUTPATIENT
Start: 2021-11-16 | End: 2021-11-18

## 2021-11-16 RX ORDER — LOSARTAN POTASSIUM 100 MG/1
50 TABLET, FILM COATED ORAL DAILY
Refills: 0 | Status: DISCONTINUED | OUTPATIENT
Start: 2021-11-16 | End: 2021-11-18

## 2021-11-16 RX ORDER — FUROSEMIDE 40 MG
20 TABLET ORAL DAILY
Refills: 0 | Status: DISCONTINUED | OUTPATIENT
Start: 2021-11-17 | End: 2021-11-18

## 2021-11-16 RX ORDER — AMLODIPINE BESYLATE 2.5 MG/1
10 TABLET ORAL DAILY
Refills: 0 | Status: DISCONTINUED | OUTPATIENT
Start: 2021-11-16 | End: 2021-11-18

## 2021-11-16 RX ORDER — VENLAFAXINE HCL 75 MG
225 CAPSULE, EXT RELEASE 24 HR ORAL DAILY
Refills: 0 | Status: DISCONTINUED | OUTPATIENT
Start: 2021-11-16 | End: 2021-11-18

## 2021-11-16 RX ORDER — PANTOPRAZOLE SODIUM 20 MG/1
40 TABLET, DELAYED RELEASE ORAL
Refills: 0 | Status: DISCONTINUED | OUTPATIENT
Start: 2021-11-16 | End: 2021-11-18

## 2021-11-16 RX ORDER — QUETIAPINE FUMARATE 200 MG/1
25 TABLET, FILM COATED ORAL AT BEDTIME
Refills: 0 | Status: DISCONTINUED | OUTPATIENT
Start: 2021-11-16 | End: 2021-11-18

## 2021-11-16 RX ORDER — DIVALPROEX SODIUM 500 MG/1
1000 TABLET, DELAYED RELEASE ORAL DAILY
Refills: 0 | Status: DISCONTINUED | OUTPATIENT
Start: 2021-11-16 | End: 2021-11-18

## 2021-11-16 RX ORDER — SIMETHICONE 80 MG/1
80 TABLET, CHEWABLE ORAL
Refills: 0 | Status: DISCONTINUED | OUTPATIENT
Start: 2021-11-16 | End: 2021-11-18

## 2021-11-16 RX ORDER — VALPROIC ACID (AS SODIUM SALT) 250 MG/5ML
250 SOLUTION, ORAL ORAL EVERY 6 HOURS
Refills: 0 | Status: DISCONTINUED | OUTPATIENT
Start: 2021-11-16 | End: 2021-11-16

## 2021-11-16 RX ADMIN — TAMSULOSIN HYDROCHLORIDE 0.4 MILLIGRAM(S): 0.4 CAPSULE ORAL at 21:27

## 2021-11-16 RX ADMIN — Medication 100 MILLIGRAM(S): at 22:22

## 2021-11-16 RX ADMIN — OXYCODONE AND ACETAMINOPHEN 1 TABLET(S): 5; 325 TABLET ORAL at 00:34

## 2021-11-16 RX ADMIN — Medication 650 MILLIGRAM(S): at 23:20

## 2021-11-16 RX ADMIN — Medication 40 MILLIGRAM(S): at 05:03

## 2021-11-16 RX ADMIN — Medication 20 MILLIGRAM(S): at 05:03

## 2021-11-16 RX ADMIN — OXYCODONE AND ACETAMINOPHEN 1 TABLET(S): 5; 325 TABLET ORAL at 12:40

## 2021-11-16 RX ADMIN — Medication 26.25 MILLIGRAM(S): at 00:25

## 2021-11-16 RX ADMIN — ALBUTEROL 2 PUFF(S): 90 AEROSOL, METERED ORAL at 21:26

## 2021-11-16 RX ADMIN — OXYCODONE AND ACETAMINOPHEN 1 TABLET(S): 5; 325 TABLET ORAL at 19:44

## 2021-11-16 RX ADMIN — Medication 26.25 MILLIGRAM(S): at 05:34

## 2021-11-16 RX ADMIN — ENOXAPARIN SODIUM 40 MILLIGRAM(S): 100 INJECTION SUBCUTANEOUS at 05:03

## 2021-11-16 RX ADMIN — DIVALPROEX SODIUM 1000 MILLIGRAM(S): 500 TABLET, DELAYED RELEASE ORAL at 16:48

## 2021-11-16 RX ADMIN — OXYCODONE AND ACETAMINOPHEN 1 TABLET(S): 5; 325 TABLET ORAL at 14:04

## 2021-11-16 RX ADMIN — OXYCODONE AND ACETAMINOPHEN 1 TABLET(S): 5; 325 TABLET ORAL at 20:40

## 2021-11-16 RX ADMIN — Medication 650 MILLIGRAM(S): at 16:52

## 2021-11-16 RX ADMIN — Medication 100 MILLIGRAM(S): at 15:42

## 2021-11-16 RX ADMIN — Medication 225 MILLIGRAM(S): at 12:40

## 2021-11-16 RX ADMIN — OXYCODONE AND ACETAMINOPHEN 1 TABLET(S): 5; 325 TABLET ORAL at 01:34

## 2021-11-16 RX ADMIN — SENNA PLUS 2 TABLET(S): 8.6 TABLET ORAL at 21:26

## 2021-11-16 RX ADMIN — OXYCODONE AND ACETAMINOPHEN 1 TABLET(S): 5; 325 TABLET ORAL at 07:25

## 2021-11-16 RX ADMIN — Medication 100 MILLIGRAM(S): at 08:43

## 2021-11-16 RX ADMIN — Medication 650 MILLIGRAM(S): at 09:43

## 2021-11-16 RX ADMIN — ENOXAPARIN SODIUM 40 MILLIGRAM(S): 100 INJECTION SUBCUTANEOUS at 16:48

## 2021-11-16 RX ADMIN — Medication 650 MILLIGRAM(S): at 22:22

## 2021-11-16 RX ADMIN — OXYCODONE AND ACETAMINOPHEN 1 TABLET(S): 5; 325 TABLET ORAL at 06:34

## 2021-11-16 RX ADMIN — Medication 650 MILLIGRAM(S): at 08:43

## 2021-11-16 RX ADMIN — Medication 650 MILLIGRAM(S): at 15:42

## 2021-11-16 NOTE — PROGRESS NOTE ADULT - PROBLEM SELECTOR PLAN 6
Patient w/ CHF; last echo 2020: 45%   - Clinically hypervolemic; pitting edema   change lasix back to home dose, 20mg PO daily   - Daily Weight, Strict I and Os, BMP QD;p K>4, Mg>2  - HTN: resume losartan 50mg, norvasc 10mg

## 2021-11-16 NOTE — PROGRESS NOTE ADULT - ATTENDING COMMENTS
56 M with schizoaffective d/o, ?COPD, ALEJANDRO, here with sob, found to have acute hypoxemic and hypercapnic respiratory failure due to RSV infection requiring NIPPV.    Patient clinically improving.  Agree with steroid course for RSV  does not need AVAPS at this point as acute hypercapnic respiratory failure is improving; okay to d/c on home CPAP (says he is on 11 cm h2o of cpap)    covid 19 - he states he did NOT get a booster shot; finished primary series in January, tested positive 10/18 and though he was asymptomatic, he says he got an antibody infusion.  His sob now started 3 days ago, and given he is RSV positive, his symptoms are likely related to RSV and not active COVID.  If there is a concern for ongoing covid infection, can consider checking a cycle time on the COVID pcr or consulting ID.

## 2021-11-16 NOTE — PROGRESS NOTE ADULT - SUBJECTIVE AND OBJECTIVE BOX
[facility] Division of Hospital Medicine  Nga Philippe MD  Pager [phone]    Patient is a 56y old  Male who presents with a chief complaint of Hypoxic Respiratory Failure       SUBJECTIVE / OVERNIGHT EVENTS: feeling better, some SOB      MEDICATIONS  (STANDING):  albuterol/ipratropium for Nebulization 3 milliLiter(s) Nebulizer every 6 hours  amLODIPine   Tablet 10 milliGRAM(s) Oral daily  dextrose 40% Gel 15 Gram(s) Oral once  dextrose 5%. 1000 milliLiter(s) (50 mL/Hr) IV Continuous <Continuous>  dextrose 5%. 1000 milliLiter(s) (100 mL/Hr) IV Continuous <Continuous>  dextrose 50% Injectable 25 Gram(s) IV Push once  dextrose 50% Injectable 12.5 Gram(s) IV Push once  dextrose 50% Injectable 25 Gram(s) IV Push once  diVALproex ER 1000 milliGRAM(s) Oral daily  enoxaparin Injectable 40 milliGRAM(s) SubCutaneous two times a day  glucagon  Injectable 1 milliGRAM(s) IntraMuscular once  insulin lispro (ADMELOG) corrective regimen sliding scale   SubCutaneous Before meals and at bedtime  losartan 50 milliGRAM(s) Oral daily  pantoprazole    Tablet 40 milliGRAM(s) Oral before breakfast  predniSONE   Tablet 40 milliGRAM(s) Oral daily  QUEtiapine 25 milliGRAM(s) Oral at bedtime  senna 2 Tablet(s) Oral at bedtime  tamsulosin 0.4 milliGRAM(s) Oral at bedtime  venlafaxine XR. 225 milliGRAM(s) Oral daily    MEDICATIONS  (PRN):  acetaminophen     Tablet .. 650 milliGRAM(s) Oral every 6 hours PRN Temp greater or equal to 38C (100.4F), Mild Pain (1 - 3), Moderate Pain (4 - 6)  ALBUTerol    90 MICROgram(s) HFA Inhaler 2 Puff(s) Inhalation every 4 hours PRN Shortness of Breath and/or Wheezing  benzonatate 100 milliGRAM(s) Oral three times a day PRN Cough  guaiFENesin Oral Liquid (Sugar-Free) 100 milliGRAM(s) Oral every 6 hours PRN Cough  oxycodone    5 mG/acetaminophen 325 mG 1 Tablet(s) Oral every 6 hours PRN Severe Pain (7 - 10)  simethicone 80 milliGRAM(s) Chew four times a day PRN Heartburn      CAPILLARY BLOOD GLUCOSE  POCT Blood Glucose.: 101 mg/dL (16 Nov 2021 12:26)  POCT Blood Glucose.: 116 mg/dL (16 Nov 2021 08:22)  POCT Blood Glucose.: 111 mg/dL (15 Nov 2021 21:36)  POCT Blood Glucose.: 108 mg/dL (15 Nov 2021 17:35)          PHYSICAL EXAM:  Vital Signs Last 24 Hrs  T(F): 98.4 (16 Nov 2021 12:34), Max: 98.4 (16 Nov 2021 05:41)  HR: 83 (16 Nov 2021 12:34) (83 - 92)  BP: 149/94 (16 Nov 2021 12:34) (142/77 - 152/91)  RR: 19 (16 Nov 2021 12:34) (18 - 20)  SpO2: 93% (16 Nov 2021 12:34) (90% - 100%)    CONSTITUTIONAL: NAD, sitting up at edge of bed, appears comfortable  EYES: PERRLA; conjunctiva and sclera clear  ENMT: Moist oral mucosa; normal dentition  RESPIRATORY: Normal respiratory effort;  CARDIOVASCULAR: No lower extremity edema;   ABDOMEN: obese  MUSCULOSKELETAL: no clubbing or cyanosis of digits; no joint swelling or tenderness to palpation  PSYCH: restless  NEUROLOGY: CN 2-12 are intact and symmetric; no gross sensory deficits   SKIN: No rashes; no palpable lesions    LABS:                        12.8   10.27 )-----------( 180      ( 16 Nov 2021 13:37 )             40.7     11-16    137  |  95<L>  |  26<H>  ----------------------------<  98  4.1   |  33<H>  |  0.83    Ca    10.4      16 Nov 2021 07:38  Phos  2.6     11-16  Mg     2.30     11-16        Culture - Blood (collected 15 Nov 2021 06:23)  Source: .Blood Blood-Peripheral  Preliminary Report (16 Nov 2021 07:01):    No growth to date.    Culture - Blood (collected 15 Nov 2021 06:23)  Source: .Blood Blood-Peripheral  Preliminary Report (16 Nov 2021 07:01):    No growth to date.

## 2021-11-16 NOTE — PROGRESS NOTE ADULT - PROBLEM SELECTOR PLAN 7
DVT PPx: Lovenox 40mg BID   Diet: reg  Code: Full   Dispo: Pending Hospital Course  Communication: HCP: Brother Joseph Caroasnabil: [phone]    poss MI 11/17
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
## 2021-11-16 NOTE — DISCHARGE NOTE PROVIDER - NSDCMRMEDTOKEN_GEN_ALL_CORE_FT
Advair Diskus 100 mcg-50 mcg inhalation powder: 1 puff(s) inhaled 2 times a day  albuterol 90 mcg/inh inhalation aerosol: 2 puff(s) inhaled every 6 hours, As Needed  baclofen 5 mg oral tablet: 1 tab(s) orally 3 times a day, As Needed  Depakote  mg oral tablet, extended release: 2 tab(s) orally once a day  docusate potassium 100 mg oral capsule: 1 cap(s) orally once a day (at bedtime)  Invega Sustenna 234 mg/1.5 mL intramuscular suspension, extended release: 1.5 milliliter(s) intramuscular once a month  lactulose: 30 milliliter(s) orally once a day, As Needed  Lasix 20 mg oral tablet: 1 tab(s) orally once a day  losartan 50 mg oral tablet: 1 tab(s) orally once a day  naproxen 250 mg oral tablet: 1 tab(s) orally 2 times a day, As Needed  Norvasc 10 mg oral tablet: 1 tab(s) orally once a day  Percocet 5/325 oral tablet: 1 tab(s) orally every 6 hours, As Needed  QUEtiapine 25 mg oral tablet: 1 tab(s) orally once a day (at bedtime)  simethicone 80 mg oral tablet, chewable: 1 tab(s) orally 4 times a day (after meals and at bedtime), As Needed  tamsulosin 0.4 mg oral capsule: 1 cap(s) orally once a day  venlafaxine 225 mg oral tablet, extended release: 1 tab(s) orally once a day   acetaminophen 325 mg oral tablet: 2 tab(s) orally every 6 hours, As needed, Temp greater or equal to 38C (100.4F), Mild Pain (1 - 3), Moderate Pain (4 - 6)  albuterol 90 mcg/inh inhalation aerosol: 2 puff(s) inhaled every 4 hours, As needed, Shortness of Breath and/or Wheezing  amLODIPine 10 mg oral tablet: 1 tab(s) orally once a day  benzonatate 100 mg oral capsule: 1 cap(s) orally 3 times a day, As needed, Cough  divalproex sodium 500 mg oral tablet, extended release: 2 tab(s) orally once a day  furosemide 20 mg oral tablet: 1 tab(s) orally once a day  guaiFENesin 100 mg/5 mL oral liquid: 5 milliliter(s) orally every 6 hours, As needed, Cough  losartan 50 mg oral tablet: 1 tab(s) orally once a day  oxycodone-acetaminophen 5 mg-325 mg oral tablet: 1 tab(s) orally every 6 hours, As needed, Severe Pain (7 - 10)  pantoprazole 40 mg oral delayed release tablet: 1 tab(s) orally once a day (before a meal)  predniSONE 20 mg oral tablet: 2 tab(s) orally once a day  QUEtiapine 25 mg oral tablet: 1 tab(s) orally once a day (at bedtime)  senna oral tablet: 2 tab(s) orally once a day (at bedtime)  simethicone 80 mg oral tablet, chewable: 1 tab(s) orally 4 times a day, As needed, Heartburn  tamsulosin 0.4 mg oral capsule: 1 cap(s) orally once a day (at bedtime)  venlafaxine 75 mg oral capsule, extended release: 3 cap(s) orally once a day   acetaminophen 325 mg oral tablet: 2 tab(s) orally every 6 hours, As needed, Temp greater or equal to 38C (100.4F), Mild Pain (1 - 3), Moderate Pain (4 - 6)  albuterol 90 mcg/inh inhalation aerosol: 2 puff(s) inhaled every 4 hours, As needed, Shortness of Breath and/or Wheezing  amLODIPine 10 mg oral tablet: 1 tab(s) orally once a day  benzonatate 100 mg oral capsule: 1 cap(s) orally 3 times a day, As needed, Cough  divalproex sodium 500 mg oral tablet, extended release: 2 tab(s) orally once a day  furosemide 20 mg oral tablet: 1 tab(s) orally once a day  guaiFENesin 100 mg/5 mL oral liquid: 5 milliliter(s) orally every 6 hours, As needed, Cough  losartan 50 mg oral tablet: 1 tab(s) orally once a day  pantoprazole 40 mg oral delayed release tablet: 1 tab(s) orally once a day (before a meal)  predniSONE 20 mg oral tablet: 2 tab(s) orally once a day    stop 11/20/21  QUEtiapine 25 mg oral tablet: 1 tab(s) orally once a day (at bedtime)  senna oral tablet: 2 tab(s) orally once a day (at bedtime)  simethicone 80 mg oral tablet, chewable: 1 tab(s) orally 4 times a day, As needed, Heartburn  tamsulosin 0.4 mg oral capsule: 1 cap(s) orally once a day (at bedtime)  venlafaxine 75 mg oral capsule, extended release: 3 cap(s) orally once a day

## 2021-11-16 NOTE — PROGRESS NOTE ADULT - PROBLEM SELECTOR PLAN 4
Known Hx of schizophrenia with previous suicide attempts   - Receives Invega on the 21st of every month  - depakote 1000mg at night  - Venlafaxine 225mg   - Quetiapine 25mg  currently calm

## 2021-11-16 NOTE — PROVIDER CONTACT NOTE (OTHER) - ACTION/TREATMENT ORDERED:
Pt educated on the importance of wearing AVAPS as ordered; patient verbalized understanding.
Pt educated extensively educated on the importance of wearing AVAPS QHS; patient verbalized understanding. Will keep attempting to convince patient to wear AVAPS
Pt placed on 2 liters nasal canula sating 99 percent. Will continue to monitor.
ACP aware blood work down, pending chest xray.

## 2021-11-16 NOTE — PROGRESS NOTE ADULT - PROBLEM SELECTOR PLAN 3
no evidence this is acute covid infection  pt s/p full vaccination as well as MAB for infection in October  remdesivir stopped  apprec pulm input

## 2021-11-16 NOTE — PROGRESS NOTE ADULT - SUBJECTIVE AND OBJECTIVE BOX
CHIEF COMPLAINT:  Hypercapnic respiratory failure     Interval events:  - on AVAPS overnight for ALEJANDRO   - patient seen and examined this AM, states that..          PAST MEDICAL & SURGICAL HISTORY:  Uncomplicated asthma, unspecified asthma severity    Paranoia    Hypertension, unspecified type    Diabetes  Type 2; pt lost weight, no longer taking meds    Schizoaffective disorder, bipolar type    Obstructive sleep apnea  CPAP for 7-8 years    COPD with asthma    Schizo affective schizophrenia    S/P appendectomy        FAMILY HISTORY:  FH: cirrhosis  maternal grandmother, uncle        SOCIAL HISTORY:  Smoking:  [x ] Current Smoker 2 packs per day       Allergies    Thorazine (Unknown)    Intolerances    Haldol (Unknown)      HOME MEDICATIONS:  Home Medications:  Advair Diskus 100 mcg-50 mcg inhalation powder: 1 puff(s) inhaled 2 times a day (15 Nov 2021 05:01)  albuterol 90 mcg/inh inhalation aerosol: 2 puff(s) inhaled every 6 hours, As Needed (15 Nov 2021 05:01)  baclofen 5 mg oral tablet: 1 tab(s) orally 3 times a day, As Needed (15 Nov 2021 05:01)  Depakote  mg oral tablet, extended release: 2 tab(s) orally once a day (15 Nov 2021 05:01)  docusate potassium 100 mg oral capsule: 1 cap(s) orally once a day (at bedtime) (15 Nov 2021 05:01)  Invega Sustenna 234 mg/1.5 mL intramuscular suspension, extended release: 1.5 milliliter(s) intramuscular once a month (15 Nov 2021 05:01)  lactulose: 30 milliliter(s) orally once a day, As Needed (15 Nov 2021 05:01)  Lasix 20 mg oral tablet: 1 tab(s) orally once a day (15 Nov 2021 05:01)  losartan 50 mg oral tablet: 1 tab(s) orally once a day (15 Nov 2021 05:01)  naproxen 250 mg oral tablet: 1 tab(s) orally 2 times a day, As Needed (15 Nov 2021 05:01)  Norvasc 10 mg oral tablet: 1 tab(s) orally once a day (15 Nov 2021 05:01)  Percocet 5/325 oral tablet: 1 tab(s) orally every 6 hours, As Needed (15 Nov 2021 05:01)  QUEtiapine 25 mg oral tablet: 1 tab(s) orally once a day (at bedtime) (15 Nov 2021 05:01)  simethicone 80 mg oral tablet, chewable: 1 tab(s) orally 4 times a day (after meals and at bedtime), As Needed (15 Nov 2021 05:01)  tamsulosin 0.4 mg oral capsule: 1 cap(s) orally once a day (15 Nov 2021 05:01)  venlafaxine 225 mg oral tablet, extended release: 1 tab(s) orally once a day (15 Nov 2021 05:01)      REVIEW OF SYSTEMS:  Constitutional: [ ] negative [ ] fevers [ ] chills [ ] weight loss [ ] weight gain  HEENT: [ ] negative [ ] dry eyes [ ] eye irritation [ ] postnasal drip [ ] nasal congestion  CV: [ ] negative  [ ] chest pain [ ] orthopnea [ ] palpitations [ ] murmur  Resp: [ ] negative [ ] cough [x ] shortness of breath [ ] dyspnea [ ] wheezing [ ] sputum [ ] hemoptysis  GI: [ ] negative [ ] nausea [ ] vomiting [ ] diarrhea [ ] constipation [ ] abd pain [ ] dysphagia   : [ ] negative [ ] dysuria [ ] nocturia [ ] hematuria [ ] increased urinary frequency  Musculoskeletal: [ ] negative [ ] back pain [ ] myalgias [ ] arthralgias [ ] fracture  Skin: [ ] negative [ ] rash [ ] itch  Neurological: [ ] negative [ ] headache [ ] dizziness [ ] syncope [ ] weakness [ ] numbness  Psychiatric: [ ] negative [ ] anxiety [ ] depression  Endocrine: [ ] negative [ ] diabetes [ ] thyroid problem  Hematologic/Lymphatic: [ ] negative [ ] anemia [ ] bleeding problem  Allergic/Immunologic: [ ] negative [ ] itchy eyes [ ] nasal discharge [ ] hives [ ] angioedema  [x ] All other systems negative  [ ] Unable to assess ROS because ________    OBJECTIVE:  ICU Vital Signs Last 24 Hrs  T(C): 36.6 (15 Nov 2021 05:43), Max: 37 (15 Nov 2021 01:44)  T(F): 97.8 (15 Nov 2021 05:43), Max: 98.6 (15 Nov 2021 01:44)  HR: 95 (15 Nov 2021 05:43) (79 - 97)  BP: 124/61 (15 Nov 2021 05:43) (97/71 - 152/77)  BP(mean): --  ABP: --  ABP(mean): --  RR: 20 (15 Nov 2021 05:43) (17 - 28)  SpO2: 100% (15 Nov 2021 07:19) (86% - 100%)    Mode: NIV (Noninvasive Ventilation), RR (machine): 20, TV (machine): 500, FiO2: 40, PEEP: 5, ITime: 1, PIP: 15    11-14 @ 07:01  -  11-15 @ 07:00  --------------------------------------------------------  IN: 0 mL / OUT: 300 mL / NET: -300 mL      CAPILLARY BLOOD GLUCOSE      POCT Blood Glucose.: 122 mg/dL (15 Nov 2021 12:10)      PHYSICAL EXAM:  GENERAL: alert, oriented, appears comfortable   HEAD:  Atraumatic, Normocephalic  EYES: EOMI, PERRLA, conjunctiva and sclera clear  NECK: Neck fullness Difficult to appreciate JVD,   CHEST/LUNG: breath sounds distant but no obvious crackles/wheezes,  HEART: heart sounds distant but no obvious murmurs/rubs   ABDOMEN: Soft, Nontender, Nondistended; Bowel sounds present  EXTREMITIES:  2+ pitting edema   SKIN: No rashes or lesions; noted tattoos throughout   NERVOUS SYSTEM:      LINES:     HOSPITAL MEDICATIONS:  Standing Meds:  dexAMETHasone  Injectable 6 milliGRAM(s) IV Push daily  dextrose 40% Gel 15 Gram(s) Oral once  dextrose 5%. 1000 milliLiter(s) IV Continuous <Continuous>  dextrose 5%. 1000 milliLiter(s) IV Continuous <Continuous>  dextrose 50% Injectable 25 Gram(s) IV Push once  dextrose 50% Injectable 12.5 Gram(s) IV Push once  dextrose 50% Injectable 25 Gram(s) IV Push once  enoxaparin Injectable 40 milliGRAM(s) SubCutaneous two times a day  furosemide   Injectable 20 milliGRAM(s) IV Push two times a day  glucagon  Injectable 1 milliGRAM(s) IntraMuscular once  insulin lispro (ADMELOG) corrective regimen sliding scale   SubCutaneous every 6 hours  pantoprazole  Injectable 40 milliGRAM(s) IV Push daily  remdesivir  IVPB   IV Intermittent   valproate sodium IVPB 250 milliGRAM(s) IV Intermittent every 6 hours      PRN Meds:  acetaminophen     Tablet .. 650 milliGRAM(s) Oral every 6 hours PRN  ALBUTerol    90 MICROgram(s) HFA Inhaler 2 Puff(s) Inhalation every 4 hours PRN  benzonatate 100 milliGRAM(s) Oral three times a day PRN      LABS:                        12.6   9.77  )-----------( 160      ( 15 Nov 2021 12:32 )             39.8     Hgb Trend: 12.6<--, 12.0<--  11-15    135  |  96<L>  |  24<H>  ----------------------------<  129<H>  4.5   |  30  |  0.74    Ca    10.2      15 Nov 2021 12:32  Mg     2.10     11-14    TPro  7.8  /  Alb  4.3  /  TBili  0.2  /  DBili  x   /  AST  47<H>  /  ALT  69<H>  /  AlkPhos  97  11-14    Creatinine Trend: 0.74<--, 0.88<--  PT/INR - ( 14 Nov 2021 23:42 )   PT: 12.8 sec;   INR: 1.13 ratio         PTT - ( 14 Nov 2021 23:42 )  PTT:30.5 sec    Arterial Blood Gas:  11-15 @ 12:32  7.42/49/188/32/98.4/6.1  ABG lactate: --    Venous Blood Gas:  11-15 @ 08:08  7.38/57/136/34/98.5  VBG Lactate: 2.2  Venous Blood Gas:  11-15 @ 00:40  7.32/65/54/34/82.2  VBG Lactate: 1.2  Venous Blood Gas:  11-14 @ 23:42  7.29/69/43/33/67.6  VBG Lactate: 1.5      MICROBIOLOGY:       RADIOLOGY:  [ ] Reviewed and interpreted by me    PULMONARY FUNCTION TESTS:    EKG:   CHIEF COMPLAINT:  Hypercapnic respiratory failure     Interval events:  - on AVAPS overnight for ALEJANDRO says he felt comfortable on AVAPS but normally his CPAP is 11 at home  - , on 3 L nasal cannula this AM, was taken off O2 and saturating 95-97%   - patient seen and examined this AM, states that he thinks his breathing is fine and he wants to go home          PAST MEDICAL & SURGICAL HISTORY:  Uncomplicated asthma, unspecified asthma severity    Paranoia    Hypertension, unspecified type    Diabetes  Type 2; pt lost weight, no longer taking meds    Schizoaffective disorder, bipolar type    Obstructive sleep apnea  CPAP for 7-8 years    COPD with asthma    Schizo affective schizophrenia    S/P appendectomy        FAMILY HISTORY:  FH: cirrhosis  maternal grandmother, uncle        SOCIAL HISTORY:  Smoking:  [x ] Current Smoker 2 packs per day       Allergies    Thorazine (Unknown)    Intolerances    Haldol (Unknown)      HOME MEDICATIONS:  Home Medications:  Advair Diskus 100 mcg-50 mcg inhalation powder: 1 puff(s) inhaled 2 times a day (15 Nov 2021 05:01)  albuterol 90 mcg/inh inhalation aerosol: 2 puff(s) inhaled every 6 hours, As Needed (15 Nov 2021 05:01)  baclofen 5 mg oral tablet: 1 tab(s) orally 3 times a day, As Needed (15 Nov 2021 05:01)  Depakote  mg oral tablet, extended release: 2 tab(s) orally once a day (15 Nov 2021 05:01)  docusate potassium 100 mg oral capsule: 1 cap(s) orally once a day (at bedtime) (15 Nov 2021 05:01)  Invega Sustenna 234 mg/1.5 mL intramuscular suspension, extended release: 1.5 milliliter(s) intramuscular once a month (15 Nov 2021 05:01)  lactulose: 30 milliliter(s) orally once a day, As Needed (15 Nov 2021 05:01)  Lasix 20 mg oral tablet: 1 tab(s) orally once a day (15 Nov 2021 05:01)  losartan 50 mg oral tablet: 1 tab(s) orally once a day (15 Nov 2021 05:01)  naproxen 250 mg oral tablet: 1 tab(s) orally 2 times a day, As Needed (15 Nov 2021 05:01)  Norvasc 10 mg oral tablet: 1 tab(s) orally once a day (15 Nov 2021 05:01)  Percocet 5/325 oral tablet: 1 tab(s) orally every 6 hours, As Needed (15 Nov 2021 05:01)  QUEtiapine 25 mg oral tablet: 1 tab(s) orally once a day (at bedtime) (15 Nov 2021 05:01)  simethicone 80 mg oral tablet, chewable: 1 tab(s) orally 4 times a day (after meals and at bedtime), As Needed (15 Nov 2021 05:01)  tamsulosin 0.4 mg oral capsule: 1 cap(s) orally once a day (15 Nov 2021 05:01)  venlafaxine 225 mg oral tablet, extended release: 1 tab(s) orally once a day (15 Nov 2021 05:01)      REVIEW OF SYSTEMS:  Constitutional: [ ] negative [ ] fevers [ ] chills [ ] weight loss [ ] weight gain  HEENT: [ ] negative [ ] dry eyes [ ] eye irritation [ ] postnasal drip [ ] nasal congestion  CV: [ ] negative  [ ] chest pain [ ] orthopnea [ ] palpitations [ ] murmur  Resp: [ ] negative [ ] cough [x ] shortness of breath [ ] dyspnea [ ] wheezing [ ] sputum [ ] hemoptysis  GI: [ ] negative [ ] nausea [ ] vomiting [ ] diarrhea [ ] constipation [ ] abd pain [ ] dysphagia   : [ ] negative [ ] dysuria [ ] nocturia [ ] hematuria [ ] increased urinary frequency  Musculoskeletal: [ ] negative [ ] back pain [ ] myalgias [ ] arthralgias [ ] fracture  Skin: [ ] negative [ ] rash [ ] itch  Neurological: [ ] negative [ ] headache [ ] dizziness [ ] syncope [ ] weakness [ ] numbness  Psychiatric: [ ] negative [ ] anxiety [ ] depression  Endocrine: [ ] negative [ ] diabetes [ ] thyroid problem  Hematologic/Lymphatic: [ ] negative [ ] anemia [ ] bleeding problem  Allergic/Immunologic: [ ] negative [ ] itchy eyes [ ] nasal discharge [ ] hives [ ] angioedema  [x ] All other systems negative  [ ] Unable to assess ROS because ________    OBJECTIVE:  ICU Vital Signs Last 24 Hrs  T(C): 36.6 (15 Nov 2021 05:43), Max: 37 (15 Nov 2021 01:44)  T(F): 97.8 (15 Nov 2021 05:43), Max: 98.6 (15 Nov 2021 01:44)  HR: 95 (15 Nov 2021 05:43) (79 - 97)  BP: 124/61 (15 Nov 2021 05:43) (97/71 - 152/77)  BP(mean): --  ABP: --  ABP(mean): --  RR: 20 (15 Nov 2021 05:43) (17 - 28)  SpO2: 100% (15 Nov 2021 07:19) (86% - 100%)    Mode: NIV (Noninvasive Ventilation), RR (machine): 20, TV (machine): 500, FiO2: 40, PEEP: 5, ITime: 1, PIP: 15    11-14 @ 07:01  -  11-15 @ 07:00  --------------------------------------------------------  IN: 0 mL / OUT: 300 mL / NET: -300 mL      CAPILLARY BLOOD GLUCOSE      POCT Blood Glucose.: 122 mg/dL (15 Nov 2021 12:10)      PHYSICAL EXAM:  GENERAL: alert, oriented, appears comfortable   HEAD:  Atraumatic, Normocephalic  EYES: EOMI, PERRLA, conjunctiva and sclera clear  NECK: Neck fullness Difficult to appreciate JVD,   CHEST/LUNG: breath sounds distant but no obvious crackles/wheezes,  HEART: heart sounds distant but no obvious murmurs/rubs   ABDOMEN: Soft, Nontender, Nondistended; Bowel sounds present  EXTREMITIES:  2+ pitting edema   SKIN: No rashes or lesions; noted tattoos throughout   NERVOUS SYSTEM:      LINES:     HOSPITAL MEDICATIONS:  Standing Meds:  dexAMETHasone  Injectable 6 milliGRAM(s) IV Push daily  dextrose 40% Gel 15 Gram(s) Oral once  dextrose 5%. 1000 milliLiter(s) IV Continuous <Continuous>  dextrose 5%. 1000 milliLiter(s) IV Continuous <Continuous>  dextrose 50% Injectable 25 Gram(s) IV Push once  dextrose 50% Injectable 12.5 Gram(s) IV Push once  dextrose 50% Injectable 25 Gram(s) IV Push once  enoxaparin Injectable 40 milliGRAM(s) SubCutaneous two times a day  furosemide   Injectable 20 milliGRAM(s) IV Push two times a day  glucagon  Injectable 1 milliGRAM(s) IntraMuscular once  insulin lispro (ADMELOG) corrective regimen sliding scale   SubCutaneous every 6 hours  pantoprazole  Injectable 40 milliGRAM(s) IV Push daily  remdesivir  IVPB   IV Intermittent   valproate sodium IVPB 250 milliGRAM(s) IV Intermittent every 6 hours      PRN Meds:  acetaminophen     Tablet .. 650 milliGRAM(s) Oral every 6 hours PRN  ALBUTerol    90 MICROgram(s) HFA Inhaler 2 Puff(s) Inhalation every 4 hours PRN  benzonatate 100 milliGRAM(s) Oral three times a day PRN      LABS:                        12.6   9.77  )-----------( 160      ( 15 Nov 2021 12:32 )             39.8     Hgb Trend: 12.6<--, 12.0<--  11-15    135  |  96<L>  |  24<H>  ----------------------------<  129<H>  4.5   |  30  |  0.74    Ca    10.2      15 Nov 2021 12:32  Mg     2.10     11-14    TPro  7.8  /  Alb  4.3  /  TBili  0.2  /  DBili  x   /  AST  47<H>  /  ALT  69<H>  /  AlkPhos  97  11-14    Creatinine Trend: 0.74<--, 0.88<--  PT/INR - ( 14 Nov 2021 23:42 )   PT: 12.8 sec;   INR: 1.13 ratio         PTT - ( 14 Nov 2021 23:42 )  PTT:30.5 sec    Arterial Blood Gas:  11-15 @ 12:32  7.42/49/188/32/98.4/6.1  ABG lactate: --    Venous Blood Gas:  11-15 @ 08:08  7.38/57/136/34/98.5  VBG Lactate: 2.2  Venous Blood Gas:  11-15 @ 00:40  7.32/65/54/34/82.2  VBG Lactate: 1.2  Venous Blood Gas:  11-14 @ 23:42  7.29/69/43/33/67.6  VBG Lactate: 1.5      MICROBIOLOGY:       RADIOLOGY:  [ ] Reviewed and interpreted by me    PULMONARY FUNCTION TESTS:    EKG:

## 2021-11-16 NOTE — PROGRESS NOTE ADULT - PROBLEM SELECTOR PLAN 1
Brought to ED I/s/o acute hypoxic respiratory failure w/ desat to 86%  pt rx from covid in October including receiving MAB  now with RSV and poss CHF exac  clinically improving  apprec pulm eval

## 2021-11-16 NOTE — DISCHARGE NOTE PROVIDER - NSDCFUADDAPPT_GEN_ALL_CORE_FT
Please follow up with internist at nursing home to repeat your blood work in 3 days to evaluate for resolution of mild metabolic alkalosis.
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
## 2021-11-16 NOTE — DISCHARGE NOTE PROVIDER - NSDCCPCAREPLAN_GEN_ALL_CORE_FT
PRINCIPAL DISCHARGE DIAGNOSIS  Diagnosis: Acute respiratory failure with hypoxia and hypercapnia  Assessment and Plan of Treatment: Continue with current medications as prescribed. Follow up with your routine physician's appointments. Per pulmonary, it is okay to continue with your home CPAP. Routine follow up with medicine in Nursing home. Please be sure to have your blood work done for chemistry in 3 days for resolution of mild metabolic alkalosis.      SECONDARY DISCHARGE DIAGNOSES  Diagnosis: COPD, moderate  Assessment and Plan of Treatment: You required oxygen during hospitalization but now your oxygen saturation is above 93% on room air. Continue with current medications as prescribed. You will be discharged with prednisone 40 mg x5 days; your last day of prednisone is 11/20).  Follow up with your routine physician's appointments. Per pulmonary, it is okay to continue with your home CPAP.    Diagnosis: 2019 novel coronavirus disease (COVID-19)  Assessment and Plan of Treatment: You were positive for COVID-19. You needed oxygen during hospitalization but your oxygen saturation level is above 93% on room air. Your current presentation is unlikely due to active COVID  You are fully vaccinated & received for monoclonal antibody for infection in October. Remdesivir stopped.    Diagnosis: CHF, chronic  Assessment and Plan of Treatment: Continue with lasix 20 mg PO. Low salt diet, fluid restriction to 1500 ml daily, monitor your fluid intake and weight daily, exercise as tolerated 30 minutes daily, and follow up with your physician within 1 to 2 weeks.    Diagnosis: Schizophrenia  Assessment and Plan of Treatment: You have a known history of schizophrenia with previous suicide attempts. You receive Invega on the 21st of every month. Please continue with depakote 1000mg at night, Venlafaxine 225mg, and Quetiapine 25mg.     PRINCIPAL DISCHARGE DIAGNOSIS  Diagnosis: Acute respiratory failure with hypoxia and hypercapnia  Assessment and Plan of Treatment: Continue with current medications as prescribed. Follow up with your routine physician's appointments. Per pulmonary, it is okay to continue with your home CPAP. Routine follow up with medicine in Nursing home. Please be sure to have your blood work done for chemistry in 3 days for resolution of mild metabolic alkalosis. Continue with your prednisone medication until 11/20.      SECONDARY DISCHARGE DIAGNOSES  Diagnosis: COPD, moderate  Assessment and Plan of Treatment: You required oxygen during hospitalization but now your oxygen saturation is above 93% on room air. Continue with current medications as prescribed. You will be discharged with prednisone 40 mg x5 days; your last day of prednisone is 11/20).  Follow up with your routine physician's appointments. Per pulmonary, it is okay to continue with your home CPAP. Continue with your prednisone medication until 11/20.    Diagnosis: 2019 novel coronavirus disease (COVID-19)  Assessment and Plan of Treatment: You were positive for COVID-19. You needed oxygen during hospitalization but your oxygen saturation level is above 93% on room air. Your current presentation is unlikely due to active COVID  You are fully vaccinated & received for monoclonal antibody for infection in October. Remdesivir stopped.    Diagnosis: CHF, chronic  Assessment and Plan of Treatment: Continue with lasix 20 mg PO. Low salt diet, fluid restriction to 1500 ml daily, monitor your fluid intake and weight daily, exercise as tolerated 30 minutes daily, and follow up with your physician within 1 to 2 weeks.    Diagnosis: Schizophrenia  Assessment and Plan of Treatment: You have a known history of schizophrenia with previous suicide attempts. You receive Invega on the 21st of every month. Please continue with depakote 1000mg at night, Venlafaxine 225mg, and Quetiapine 25mg.

## 2021-11-16 NOTE — PROVIDER CONTACT NOTE (OTHER) - REASON
Patient refusing bipap and wants to eat
Pt wore AVAPS from 8766-0112
Pt refused AVAPS
Patient noted to be belly breathing when bipap back on.

## 2021-11-16 NOTE — PROGRESS NOTE ADULT - PROBLEM SELECTOR PLAN 5
Baseline PaCO2: 46; now 69     cont pred for poss underlying exac  - Home advair diskus, will c/w albuterol here

## 2021-11-16 NOTE — PROVIDER CONTACT NOTE (OTHER) - ASSESSMENT
Wheezing and belly breathing noted.
Pt wore AVAPS from 3575-7037 despite explanation of importance of wearing it from 2778-9066 as ordered. Pt has no c/o SOB.
Pt refuses AVAPS QHS despite multiple attempts and explanation. Pt denies SOB, SPO2 96% on 3L NC; patient stated he will wear the AVAPS when he becomes SOB
Patient agitated and ripped of continuos avaps

## 2021-11-16 NOTE — PROGRESS NOTE ADULT - PROBLEM SELECTOR PLAN 2
Pt adm w/ hypoxic respiratory failure 2/2 RSV+ desat to 86%;  - c/w albuterol, tylenol, steroids as below

## 2021-11-17 DIAGNOSIS — M47.816 SPONDYLOSIS W/OUT MYELOPATHY OR RADICULOPATHY, LUMBAR REGION: ICD-10-CM

## 2021-11-17 LAB
ANION GAP SERPL CALC-SCNC: 6 MMOL/L — LOW (ref 7–14)
BUN SERPL-MCNC: 24 MG/DL — HIGH (ref 7–23)
CALCIUM SERPL-MCNC: 10.6 MG/DL — HIGH (ref 8.4–10.5)
CHLORIDE SERPL-SCNC: 96 MMOL/L — LOW (ref 98–107)
CO2 SERPL-SCNC: 34 MMOL/L — HIGH (ref 22–31)
CREAT SERPL-MCNC: 0.91 MG/DL — SIGNIFICANT CHANGE UP (ref 0.5–1.3)
GLUCOSE BLDC GLUCOMTR-MCNC: 112 MG/DL — HIGH (ref 70–99)
GLUCOSE BLDC GLUCOMTR-MCNC: 115 MG/DL — HIGH (ref 70–99)
GLUCOSE BLDC GLUCOMTR-MCNC: 125 MG/DL — HIGH (ref 70–99)
GLUCOSE BLDC GLUCOMTR-MCNC: 135 MG/DL — HIGH (ref 70–99)
GLUCOSE SERPL-MCNC: 113 MG/DL — HIGH (ref 70–99)
HCT VFR BLD CALC: 40.3 % — SIGNIFICANT CHANGE UP (ref 39–50)
HGB BLD-MCNC: 12.5 G/DL — LOW (ref 13–17)
MAGNESIUM SERPL-MCNC: 2 MG/DL — SIGNIFICANT CHANGE UP (ref 1.6–2.6)
MCHC RBC-ENTMCNC: 29.8 PG — SIGNIFICANT CHANGE UP (ref 27–34)
MCHC RBC-ENTMCNC: 31 GM/DL — LOW (ref 32–36)
MCV RBC AUTO: 96 FL — SIGNIFICANT CHANGE UP (ref 80–100)
NRBC # BLD: 0 /100 WBCS — SIGNIFICANT CHANGE UP
NRBC # FLD: 0 K/UL — SIGNIFICANT CHANGE UP
PHOSPHATE SERPL-MCNC: 2.3 MG/DL — LOW (ref 2.5–4.5)
PLATELET # BLD AUTO: 195 K/UL — SIGNIFICANT CHANGE UP (ref 150–400)
POTASSIUM SERPL-MCNC: 4.2 MMOL/L — SIGNIFICANT CHANGE UP (ref 3.5–5.3)
POTASSIUM SERPL-SCNC: 4.2 MMOL/L — SIGNIFICANT CHANGE UP (ref 3.5–5.3)
RBC # BLD: 4.2 M/UL — SIGNIFICANT CHANGE UP (ref 4.2–5.8)
RBC # FLD: 14.8 % — HIGH (ref 10.3–14.5)
SODIUM SERPL-SCNC: 136 MMOL/L — SIGNIFICANT CHANGE UP (ref 135–145)
WBC # BLD: 9.58 K/UL — SIGNIFICANT CHANGE UP (ref 3.8–10.5)
WBC # FLD AUTO: 9.58 K/UL — SIGNIFICANT CHANGE UP (ref 3.8–10.5)

## 2021-11-17 PROCEDURE — 93306 TTE W/DOPPLER COMPLETE: CPT | Mod: 26

## 2021-11-17 PROCEDURE — 99239 HOSP IP/OBS DSCHRG MGMT >30: CPT

## 2021-11-17 RX ADMIN — Medication 100 MILLIGRAM(S): at 09:47

## 2021-11-17 RX ADMIN — Medication 40 MILLIGRAM(S): at 05:05

## 2021-11-17 RX ADMIN — Medication 225 MILLIGRAM(S): at 13:28

## 2021-11-17 RX ADMIN — OXYCODONE AND ACETAMINOPHEN 1 TABLET(S): 5; 325 TABLET ORAL at 14:30

## 2021-11-17 RX ADMIN — OXYCODONE AND ACETAMINOPHEN 1 TABLET(S): 5; 325 TABLET ORAL at 05:20

## 2021-11-17 RX ADMIN — Medication 650 MILLIGRAM(S): at 20:00

## 2021-11-17 RX ADMIN — Medication 100 MILLIGRAM(S): at 13:28

## 2021-11-17 RX ADMIN — Medication 20 MILLIGRAM(S): at 05:04

## 2021-11-17 RX ADMIN — ENOXAPARIN SODIUM 40 MILLIGRAM(S): 100 INJECTION SUBCUTANEOUS at 17:49

## 2021-11-17 RX ADMIN — LOSARTAN POTASSIUM 50 MILLIGRAM(S): 100 TABLET, FILM COATED ORAL at 05:05

## 2021-11-17 RX ADMIN — Medication 100 MILLIGRAM(S): at 19:02

## 2021-11-17 RX ADMIN — OXYCODONE AND ACETAMINOPHEN 1 TABLET(S): 5; 325 TABLET ORAL at 04:21

## 2021-11-17 RX ADMIN — AMLODIPINE BESYLATE 10 MILLIGRAM(S): 2.5 TABLET ORAL at 05:06

## 2021-11-17 RX ADMIN — OXYCODONE AND ACETAMINOPHEN 1 TABLET(S): 5; 325 TABLET ORAL at 21:54

## 2021-11-17 RX ADMIN — ENOXAPARIN SODIUM 40 MILLIGRAM(S): 100 INJECTION SUBCUTANEOUS at 05:03

## 2021-11-17 RX ADMIN — DIVALPROEX SODIUM 1000 MILLIGRAM(S): 500 TABLET, DELAYED RELEASE ORAL at 13:28

## 2021-11-17 RX ADMIN — PANTOPRAZOLE SODIUM 40 MILLIGRAM(S): 20 TABLET, DELAYED RELEASE ORAL at 05:04

## 2021-11-17 RX ADMIN — OXYCODONE AND ACETAMINOPHEN 1 TABLET(S): 5; 325 TABLET ORAL at 22:50

## 2021-11-17 RX ADMIN — Medication 650 MILLIGRAM(S): at 09:48

## 2021-11-17 RX ADMIN — ALBUTEROL 2 PUFF(S): 90 AEROSOL, METERED ORAL at 04:24

## 2021-11-17 RX ADMIN — Medication 650 MILLIGRAM(S): at 10:45

## 2021-11-17 RX ADMIN — Medication 650 MILLIGRAM(S): at 19:00

## 2021-11-17 RX ADMIN — Medication 100 MILLIGRAM(S): at 21:54

## 2021-11-17 RX ADMIN — OXYCODONE AND ACETAMINOPHEN 1 TABLET(S): 5; 325 TABLET ORAL at 13:34

## 2021-11-17 RX ADMIN — Medication 100 MILLIGRAM(S): at 04:22

## 2021-11-17 RX ADMIN — SENNA PLUS 2 TABLET(S): 8.6 TABLET ORAL at 21:08

## 2021-11-17 RX ADMIN — TAMSULOSIN HYDROCHLORIDE 0.4 MILLIGRAM(S): 0.4 CAPSULE ORAL at 21:09

## 2021-11-18 ENCOUNTER — APPOINTMENT (OUTPATIENT)
Dept: ORTHOPEDIC SURGERY | Facility: CLINIC | Age: 56
End: 2021-11-18

## 2021-11-18 ENCOUNTER — TRANSCRIPTION ENCOUNTER (OUTPATIENT)
Age: 56
End: 2021-11-18

## 2021-11-18 VITALS
OXYGEN SATURATION: 91 % | HEART RATE: 81 BPM | TEMPERATURE: 98 F | DIASTOLIC BLOOD PRESSURE: 87 MMHG | SYSTOLIC BLOOD PRESSURE: 147 MMHG | RESPIRATION RATE: 20 BRPM

## 2021-11-18 LAB
ANION GAP SERPL CALC-SCNC: 9 MMOL/L — SIGNIFICANT CHANGE UP (ref 7–14)
BUN SERPL-MCNC: 20 MG/DL — SIGNIFICANT CHANGE UP (ref 7–23)
CALCIUM SERPL-MCNC: 10.7 MG/DL — HIGH (ref 8.4–10.5)
CHLORIDE SERPL-SCNC: 96 MMOL/L — LOW (ref 98–107)
CO2 SERPL-SCNC: 32 MMOL/L — HIGH (ref 22–31)
CREAT SERPL-MCNC: 0.83 MG/DL — SIGNIFICANT CHANGE UP (ref 0.5–1.3)
GLUCOSE BLDC GLUCOMTR-MCNC: 120 MG/DL — HIGH (ref 70–99)
GLUCOSE SERPL-MCNC: 95 MG/DL — SIGNIFICANT CHANGE UP (ref 70–99)
HCT VFR BLD CALC: 40.7 % — SIGNIFICANT CHANGE UP (ref 39–50)
HGB BLD-MCNC: 12.6 G/DL — LOW (ref 13–17)
MAGNESIUM SERPL-MCNC: 2.2 MG/DL — SIGNIFICANT CHANGE UP (ref 1.6–2.6)
MCHC RBC-ENTMCNC: 29.3 PG — SIGNIFICANT CHANGE UP (ref 27–34)
MCHC RBC-ENTMCNC: 31 GM/DL — LOW (ref 32–36)
MCV RBC AUTO: 94.7 FL — SIGNIFICANT CHANGE UP (ref 80–100)
NRBC # BLD: 0 /100 WBCS — SIGNIFICANT CHANGE UP
NRBC # FLD: 0 K/UL — SIGNIFICANT CHANGE UP
PHOSPHATE SERPL-MCNC: 2.4 MG/DL — LOW (ref 2.5–4.5)
PLATELET # BLD AUTO: 182 K/UL — SIGNIFICANT CHANGE UP (ref 150–400)
POTASSIUM SERPL-MCNC: 4.4 MMOL/L — SIGNIFICANT CHANGE UP (ref 3.5–5.3)
POTASSIUM SERPL-SCNC: 4.4 MMOL/L — SIGNIFICANT CHANGE UP (ref 3.5–5.3)
RBC # BLD: 4.3 M/UL — SIGNIFICANT CHANGE UP (ref 4.2–5.8)
RBC # FLD: 14.7 % — HIGH (ref 10.3–14.5)
SODIUM SERPL-SCNC: 137 MMOL/L — SIGNIFICANT CHANGE UP (ref 135–145)
WBC # BLD: 9.22 K/UL — SIGNIFICANT CHANGE UP (ref 3.8–10.5)
WBC # FLD AUTO: 9.22 K/UL — SIGNIFICANT CHANGE UP (ref 3.8–10.5)

## 2021-11-18 PROCEDURE — 99231 SBSQ HOSP IP/OBS SF/LOW 25: CPT

## 2021-11-18 RX ORDER — VENLAFAXINE HCL 75 MG
1 CAPSULE, EXT RELEASE 24 HR ORAL
Qty: 0 | Refills: 0 | DISCHARGE

## 2021-11-18 RX ORDER — SENNA PLUS 8.6 MG/1
2 TABLET ORAL
Qty: 0 | Refills: 0 | DISCHARGE
Start: 2021-11-18

## 2021-11-18 RX ORDER — FUROSEMIDE 40 MG
1 TABLET ORAL
Qty: 0 | Refills: 0 | DISCHARGE

## 2021-11-18 RX ORDER — VENLAFAXINE HCL 75 MG
3 CAPSULE, EXT RELEASE 24 HR ORAL
Qty: 0 | Refills: 0 | DISCHARGE
Start: 2021-11-18

## 2021-11-18 RX ORDER — SIMETHICONE 80 MG/1
1 TABLET, CHEWABLE ORAL
Qty: 0 | Refills: 0 | DISCHARGE

## 2021-11-18 RX ORDER — DOCUSATE SODIUM 100 MG
1 CAPSULE ORAL
Qty: 0 | Refills: 0 | DISCHARGE

## 2021-11-18 RX ORDER — QUETIAPINE FUMARATE 200 MG/1
1 TABLET, FILM COATED ORAL
Qty: 0 | Refills: 0 | DISCHARGE

## 2021-11-18 RX ORDER — DIVALPROEX SODIUM 500 MG/1
2 TABLET, DELAYED RELEASE ORAL
Qty: 0 | Refills: 0 | DISCHARGE

## 2021-11-18 RX ORDER — FLUTICASONE PROPIONATE AND SALMETEROL 50; 250 UG/1; UG/1
1 POWDER ORAL; RESPIRATORY (INHALATION)
Qty: 0 | Refills: 0 | DISCHARGE

## 2021-11-18 RX ORDER — SIMETHICONE 80 MG/1
1 TABLET, CHEWABLE ORAL
Qty: 0 | Refills: 0 | DISCHARGE
Start: 2021-11-18

## 2021-11-18 RX ORDER — TAMSULOSIN HYDROCHLORIDE 0.4 MG/1
1 CAPSULE ORAL
Qty: 0 | Refills: 0 | DISCHARGE

## 2021-11-18 RX ORDER — QUETIAPINE FUMARATE 200 MG/1
1 TABLET, FILM COATED ORAL
Qty: 0 | Refills: 0 | DISCHARGE
Start: 2021-11-18

## 2021-11-18 RX ORDER — LACTULOSE 10 G/15ML
30 SOLUTION ORAL
Qty: 0 | Refills: 0 | DISCHARGE

## 2021-11-18 RX ORDER — FUROSEMIDE 40 MG
1 TABLET ORAL
Qty: 0 | Refills: 0 | DISCHARGE
Start: 2021-11-18

## 2021-11-18 RX ORDER — BACLOFEN 100 %
1 POWDER (GRAM) MISCELLANEOUS
Qty: 0 | Refills: 0 | DISCHARGE

## 2021-11-18 RX ORDER — AMLODIPINE BESYLATE 2.5 MG/1
1 TABLET ORAL
Qty: 0 | Refills: 0 | DISCHARGE
Start: 2021-11-18

## 2021-11-18 RX ORDER — AMLODIPINE BESYLATE 2.5 MG/1
1 TABLET ORAL
Qty: 0 | Refills: 0 | DISCHARGE

## 2021-11-18 RX ORDER — TAMSULOSIN HYDROCHLORIDE 0.4 MG/1
1 CAPSULE ORAL
Qty: 0 | Refills: 0 | DISCHARGE
Start: 2021-11-18

## 2021-11-18 RX ORDER — PALIPERIDONE 1.5 MG/1
1.5 TABLET, EXTENDED RELEASE ORAL
Qty: 0 | Refills: 0 | DISCHARGE

## 2021-11-18 RX ORDER — POTASSIUM PHOSPHATE, MONOBASIC POTASSIUM PHOSPHATE, DIBASIC 236; 224 MG/ML; MG/ML
15 INJECTION, SOLUTION INTRAVENOUS ONCE
Refills: 0 | Status: DISCONTINUED | OUTPATIENT
Start: 2021-11-18 | End: 2021-11-18

## 2021-11-18 RX ORDER — LOSARTAN POTASSIUM 100 MG/1
1 TABLET, FILM COATED ORAL
Qty: 0 | Refills: 0 | DISCHARGE
Start: 2021-11-18

## 2021-11-18 RX ORDER — ALBUTEROL 90 UG/1
2 AEROSOL, METERED ORAL
Qty: 0 | Refills: 0 | DISCHARGE
Start: 2021-11-18

## 2021-11-18 RX ORDER — LOSARTAN POTASSIUM 100 MG/1
1 TABLET, FILM COATED ORAL
Qty: 0 | Refills: 0 | DISCHARGE

## 2021-11-18 RX ORDER — DIVALPROEX SODIUM 500 MG/1
2 TABLET, DELAYED RELEASE ORAL
Qty: 0 | Refills: 0 | DISCHARGE
Start: 2021-11-18

## 2021-11-18 RX ORDER — PANTOPRAZOLE SODIUM 20 MG/1
1 TABLET, DELAYED RELEASE ORAL
Qty: 0 | Refills: 0 | DISCHARGE
Start: 2021-11-18

## 2021-11-18 RX ORDER — ACETAMINOPHEN 500 MG
2 TABLET ORAL
Qty: 0 | Refills: 0 | DISCHARGE
Start: 2021-11-18

## 2021-11-18 RX ORDER — ALBUTEROL 90 UG/1
2 AEROSOL, METERED ORAL
Qty: 0 | Refills: 0 | DISCHARGE

## 2021-11-18 RX ADMIN — LOSARTAN POTASSIUM 50 MILLIGRAM(S): 100 TABLET, FILM COATED ORAL at 05:03

## 2021-11-18 RX ADMIN — Medication 650 MILLIGRAM(S): at 03:20

## 2021-11-18 RX ADMIN — DIVALPROEX SODIUM 1000 MILLIGRAM(S): 500 TABLET, DELAYED RELEASE ORAL at 11:23

## 2021-11-18 RX ADMIN — OXYCODONE AND ACETAMINOPHEN 1 TABLET(S): 5; 325 TABLET ORAL at 06:35

## 2021-11-18 RX ADMIN — AMLODIPINE BESYLATE 10 MILLIGRAM(S): 2.5 TABLET ORAL at 05:04

## 2021-11-18 RX ADMIN — Medication 225 MILLIGRAM(S): at 11:24

## 2021-11-18 RX ADMIN — PANTOPRAZOLE SODIUM 40 MILLIGRAM(S): 20 TABLET, DELAYED RELEASE ORAL at 05:04

## 2021-11-18 RX ADMIN — OXYCODONE AND ACETAMINOPHEN 1 TABLET(S): 5; 325 TABLET ORAL at 05:39

## 2021-11-18 RX ADMIN — Medication 100 MILLIGRAM(S): at 05:39

## 2021-11-18 RX ADMIN — OXYCODONE AND ACETAMINOPHEN 1 TABLET(S): 5; 325 TABLET ORAL at 13:01

## 2021-11-18 RX ADMIN — Medication 100 MILLIGRAM(S): at 02:23

## 2021-11-18 RX ADMIN — Medication 40 MILLIGRAM(S): at 05:04

## 2021-11-18 RX ADMIN — Medication 100 MILLIGRAM(S): at 10:11

## 2021-11-18 RX ADMIN — OXYCODONE AND ACETAMINOPHEN 1 TABLET(S): 5; 325 TABLET ORAL at 12:07

## 2021-11-18 RX ADMIN — Medication 20 MILLIGRAM(S): at 05:03

## 2021-11-18 RX ADMIN — Medication 650 MILLIGRAM(S): at 02:24

## 2021-11-18 RX ADMIN — ENOXAPARIN SODIUM 40 MILLIGRAM(S): 100 INJECTION SUBCUTANEOUS at 05:02

## 2021-11-18 NOTE — DISCHARGE NOTE NURSING/CASE MANAGEMENT/SOCIAL WORK - PATIENT PORTAL LINK FT
You can access the FollowMyHealth Patient Portal offered by [facility] by registering at the following website: [email or web address]. By joining [employer]’s FollowMyHealth portal, you will also be able to view your health information using other applications (apps) compatible with our system.

## 2021-11-18 NOTE — CHART NOTE - NSCHARTNOTEFT_GEN_A_CORE
Discussed with infection control (ext: 3090) about contact isolation duration. Contact Isolation to end in five days (11/19) for RSV.
pt planned for dc today  was stable for dc yesterday but NH did ot have isolation bed for him  remains stable for dc  31 min spent with dc planning
pt seen and examined by me  asking to go home  reports some cough and states he didn't sleep well but that is a chronic issue  no distress  +RSV  pred for 3 more days for total of 5  SW to confirm with NH if pt can return due to +RSV  pt with mild contraction alkalosis, likely from IV lasix; now back to home dose PO  encourage PO hydration  33 min spent with dc planning
verified with [facility] - patient takes percocet 1 tab Q6 hrs prn for pain
57 yo man with schizophrenia, NHR, with h/o COPD presenting with SOB and RA hypoxia and found to have COVID and RSV, placed on AVAPS for hypercapnia to 69 on vbg (pH 7.29),  Improved to 7.32/65 on AVAPs with improvement in MS.  He is lethargic, sleeping comfortable on AVAPs  arousable to voice and answering questions appropriately.  States he is on CPAP at night x 8-9 years for ALEJANDRO.  AVAPS 500 30/8 30%  RR 23  O2 sat 100%.  He may remian on AVAPs at current settings until sxs improve with treatment; and be admitted to med floor  Prolonged exhalation phase observed, suggest aggressive bronchodilators and steroids for both viruses  Oxygenation adequate on min FiO2  d/w ED team

## 2021-11-20 LAB
CULTURE RESULTS: SIGNIFICANT CHANGE UP
CULTURE RESULTS: SIGNIFICANT CHANGE UP
SPECIMEN SOURCE: SIGNIFICANT CHANGE UP
SPECIMEN SOURCE: SIGNIFICANT CHANGE UP

## 2021-12-02 ENCOUNTER — APPOINTMENT (OUTPATIENT)
Dept: ORTHOPEDIC SURGERY | Facility: CLINIC | Age: 56
End: 2021-12-02

## 2021-12-03 ENCOUNTER — APPOINTMENT (OUTPATIENT)
Dept: ORTHOPEDIC SURGERY | Facility: CLINIC | Age: 56
End: 2021-12-03
Payer: MEDICARE

## 2021-12-03 VITALS — WEIGHT: 310 LBS | HEIGHT: 74 IN | BODY MASS INDEX: 39.78 KG/M2

## 2021-12-03 DIAGNOSIS — Z86.79 PERSONAL HISTORY OF OTHER DISEASES OF THE CIRCULATORY SYSTEM: ICD-10-CM

## 2021-12-03 DIAGNOSIS — Z87.09 PERSONAL HISTORY OF OTHER DISEASES OF THE RESPIRATORY SYSTEM: ICD-10-CM

## 2021-12-03 DIAGNOSIS — Z86.69 PERSONAL HISTORY OF OTHER DISEASES OF THE NERVOUS SYSTEM AND SENSE ORGANS: ICD-10-CM

## 2021-12-03 DIAGNOSIS — Z78.9 OTHER SPECIFIED HEALTH STATUS: ICD-10-CM

## 2021-12-03 DIAGNOSIS — Z86.39 PERSONAL HISTORY OF OTHER ENDOCRINE, NUTRITIONAL AND METABOLIC DISEASE: ICD-10-CM

## 2021-12-03 DIAGNOSIS — F17.200 NICOTINE DEPENDENCE, UNSPECIFIED, UNCOMPLICATED: ICD-10-CM

## 2021-12-03 DIAGNOSIS — M54.50 LOW BACK PAIN, UNSPECIFIED: ICD-10-CM

## 2021-12-03 PROCEDURE — 72100 X-RAY EXAM L-S SPINE 2/3 VWS: CPT

## 2021-12-03 PROCEDURE — 99204 OFFICE O/P NEW MOD 45 MIN: CPT

## 2021-12-03 RX ORDER — AMLODIPINE BESYLATE 10 MG/1
10 TABLET ORAL
Refills: 0 | Status: ACTIVE | COMMUNITY

## 2021-12-03 RX ORDER — LOSARTAN POTASSIUM 50 MG/1
50 TABLET, FILM COATED ORAL
Refills: 0 | Status: ACTIVE | COMMUNITY

## 2021-12-03 RX ORDER — VENLAFAXINE 75 MG/1
75 TABLET ORAL
Refills: 0 | Status: ACTIVE | COMMUNITY

## 2021-12-03 RX ORDER — OXYCODONE HYDROCHLORIDE AND ACETAMINOPHEN 10; 325 MG/1; MG/1
TABLET ORAL
Refills: 0 | Status: ACTIVE | COMMUNITY

## 2021-12-03 RX ORDER — QUETIAPINE FUMARATE 25 MG/1
25 TABLET ORAL
Refills: 0 | Status: ACTIVE | COMMUNITY

## 2021-12-03 RX ORDER — DIVALPROEX SODIUM 500 MG/1
TABLET, DELAYED RELEASE ORAL
Refills: 0 | Status: ACTIVE | COMMUNITY

## 2021-12-03 RX ORDER — SIMETHICONE CHEW TAB 80 MG 80 MG
80 TABLET ORAL
Refills: 0 | Status: ACTIVE | COMMUNITY

## 2021-12-03 RX ORDER — PREDNISONE 20 MG/1
20 TABLET ORAL
Refills: 0 | Status: ACTIVE | COMMUNITY

## 2021-12-03 RX ORDER — FUROSEMIDE 20 MG/1
20 TABLET ORAL
Refills: 0 | Status: ACTIVE | COMMUNITY

## 2021-12-03 RX ORDER — SENNOSIDES 8.6 MG/1
8.6 CAPSULE, GELATIN COATED ORAL
Refills: 0 | Status: ACTIVE | COMMUNITY

## 2021-12-03 RX ORDER — TAMSULOSIN HYDROCHLORIDE 0.4 MG/1
0.4 CAPSULE ORAL
Refills: 0 | Status: ACTIVE | COMMUNITY

## 2021-12-03 RX ORDER — ACETAMINOPHEN 325 MG/1
TABLET, FILM COATED ORAL
Refills: 0 | Status: ACTIVE | COMMUNITY

## 2021-12-03 RX ORDER — BENZONATATE 100 MG/1
100 CAPSULE ORAL
Refills: 0 | Status: ACTIVE | COMMUNITY

## 2021-12-03 RX ORDER — PANTOPRAZOLE 40 MG/1
40 TABLET, DELAYED RELEASE ORAL
Refills: 0 | Status: ACTIVE | COMMUNITY

## 2021-12-10 ENCOUNTER — APPOINTMENT (OUTPATIENT)
Dept: ORTHOPEDIC SURGERY | Facility: CLINIC | Age: 56
End: 2021-12-10

## 2021-12-24 ENCOUNTER — APPOINTMENT (OUTPATIENT)
Dept: MRI IMAGING | Facility: CLINIC | Age: 56
End: 2021-12-24

## 2021-12-24 ENCOUNTER — OUTPATIENT (OUTPATIENT)
Dept: OUTPATIENT SERVICES | Facility: HOSPITAL | Age: 56
LOS: 1 days | End: 2021-12-24
Payer: MEDICARE

## 2021-12-24 DIAGNOSIS — Z90.49 ACQUIRED ABSENCE OF OTHER SPECIFIED PARTS OF DIGESTIVE TRACT: Chronic | ICD-10-CM

## 2021-12-24 DIAGNOSIS — Z00.00 ENCOUNTER FOR GENERAL ADULT MEDICAL EXAMINATION WITHOUT ABNORMAL FINDINGS: ICD-10-CM

## 2021-12-24 PROCEDURE — 72148 MRI LUMBAR SPINE W/O DYE: CPT

## 2021-12-24 PROCEDURE — 72148 MRI LUMBAR SPINE W/O DYE: CPT | Mod: 26

## 2022-01-07 NOTE — ED ADULT NURSE NOTE - PRIMARY CARE PROVIDER
unknown Enbrel Counseling:  I discussed with the patient the risks of etanercept including but not limited to myelosuppression, immunosuppression, autoimmune hepatitis, demyelinating diseases, lymphoma, and infections.  The patient understands that monitoring is required including a PPD at baseline and must alert us or the primary physician if symptoms of infection or other concerning signs are noted.

## 2022-01-11 ENCOUNTER — APPOINTMENT (OUTPATIENT)
Dept: ORTHOPEDIC SURGERY | Facility: CLINIC | Age: 57
End: 2022-01-11
Payer: MEDICARE

## 2022-01-11 DIAGNOSIS — M51.36 OTHER INTERVERTEBRAL DISC DEGENERATION, LUMBAR REGION: ICD-10-CM

## 2022-01-11 DIAGNOSIS — M41.9 SCOLIOSIS, UNSPECIFIED: ICD-10-CM

## 2022-01-11 DIAGNOSIS — M43.16 SPONDYLOLISTHESIS, LUMBAR REGION: ICD-10-CM

## 2022-01-11 PROCEDURE — 99214 OFFICE O/P EST MOD 30 MIN: CPT

## 2022-01-18 NOTE — H&P ADULT - PROBLEM SELECTOR PROBLEM 1
Acute respiratory failure with hypoxia and hypercapnia
Patient with one or more new problems requiring additional work-up/treatment.

## 2022-02-23 NOTE — CHART NOTE - NSCHARTNOTEFT_GEN_A_CORE
Consult received for shelter placement  Pt is a 54year old male with pmhx of  HTN, DM, ALEJANDRO, schizophrenia. Pt came to the ED  with depression and he reports that geodon and psych meds are not working as he  continues to feel depressed as per ED Provider's note. Marcus met with Pt at bedside , Pt appeared alert and oriented x3. Role of marcus explained. Pt is requesting shelter placement in [place] ,  shelter placement process explained. Initially, he would have to go to the [facility] and thereafter  he would get placed. Pt went further to say that  “the bloods are after me", marcus suggested to him to report to the police .Pt also reports  that he lost his benefit cards . Pt was provided with phone numbers for replacement ([phone] or [phone]) , he was advised to follow the prompts and was offered support.  He was also provided with [facility] a place where he could go to have  a shower ,eat and do his  laundry.  Pt verbalized understanding and gratitude. Pt has appointment with  his private Psych tomorrow, transportation to  [facility] was arranged (confirmation # [id number])  via Pt's medicaid.  time was 5:45pm. Physician  and Pt made aware. Pt is socially cleared. 4 = No assist / stand by assistance

## 2022-03-17 ENCOUNTER — EMERGENCY (EMERGENCY)
Facility: HOSPITAL | Age: 57
LOS: 1 days | Discharge: HOME | End: 2022-03-17
Attending: EMERGENCY MEDICINE | Admitting: EMERGENCY MEDICINE
Payer: MEDICARE

## 2022-03-17 VITALS
TEMPERATURE: 98 F | DIASTOLIC BLOOD PRESSURE: 60 MMHG | RESPIRATION RATE: 24 BRPM | SYSTOLIC BLOOD PRESSURE: 128 MMHG | OXYGEN SATURATION: 100 % | HEIGHT: 74 IN | HEART RATE: 71 BPM

## 2022-03-17 VITALS
RESPIRATION RATE: 16 BRPM | SYSTOLIC BLOOD PRESSURE: 144 MMHG | HEART RATE: 69 BPM | DIASTOLIC BLOOD PRESSURE: 83 MMHG | TEMPERATURE: 98 F | OXYGEN SATURATION: 97 %

## 2022-03-17 DIAGNOSIS — Z90.49 ACQUIRED ABSENCE OF OTHER SPECIFIED PARTS OF DIGESTIVE TRACT: Chronic | ICD-10-CM

## 2022-03-17 LAB
ALBUMIN SERPL ELPH-MCNC: 4.2 G/DL — SIGNIFICANT CHANGE UP (ref 3.3–5)
ALP SERPL-CCNC: 97 U/L — SIGNIFICANT CHANGE UP (ref 40–120)
ALT FLD-CCNC: 34 U/L — SIGNIFICANT CHANGE UP (ref 4–41)
AMPHET UR-MCNC: NEGATIVE — SIGNIFICANT CHANGE UP
ANION GAP SERPL CALC-SCNC: 11 MMOL/L — SIGNIFICANT CHANGE UP (ref 7–14)
APAP SERPL-MCNC: <10 UG/ML — LOW (ref 15–25)
AST SERPL-CCNC: 47 U/L — HIGH (ref 4–40)
BARBITURATES UR SCN-MCNC: NEGATIVE — SIGNIFICANT CHANGE UP
BASE EXCESS BLDV CALC-SCNC: 4.9 MMOL/L — HIGH (ref -2–3)
BASOPHILS # BLD AUTO: 0.04 K/UL — SIGNIFICANT CHANGE UP (ref 0–0.2)
BASOPHILS NFR BLD AUTO: 0.6 % — SIGNIFICANT CHANGE UP (ref 0–2)
BENZODIAZ UR-MCNC: NEGATIVE — SIGNIFICANT CHANGE UP
BILIRUB SERPL-MCNC: 0.2 MG/DL — SIGNIFICANT CHANGE UP (ref 0.2–1.2)
BLOOD GAS VENOUS COMPREHENSIVE RESULT: SIGNIFICANT CHANGE UP
BUN SERPL-MCNC: 13 MG/DL — SIGNIFICANT CHANGE UP (ref 7–23)
CALCIUM SERPL-MCNC: 10.7 MG/DL — HIGH (ref 8.4–10.5)
CHLORIDE BLDV-SCNC: 101 MMOL/L — SIGNIFICANT CHANGE UP (ref 96–108)
CHLORIDE SERPL-SCNC: 103 MMOL/L — SIGNIFICANT CHANGE UP (ref 98–107)
CO2 BLDV-SCNC: 35.6 MMOL/L — HIGH (ref 22–26)
CO2 SERPL-SCNC: 28 MMOL/L — SIGNIFICANT CHANGE UP (ref 22–31)
COCAINE METAB.OTHER UR-MCNC: NEGATIVE — SIGNIFICANT CHANGE UP
CREAT SERPL-MCNC: 0.96 MG/DL — SIGNIFICANT CHANGE UP (ref 0.5–1.3)
CREATININE URINE RESULT, DAU: 259 MG/DL — SIGNIFICANT CHANGE UP
EGFR: 92 ML/MIN/1.73M2 — SIGNIFICANT CHANGE UP
EOSINOPHIL # BLD AUTO: 0.33 K/UL — SIGNIFICANT CHANGE UP (ref 0–0.5)
EOSINOPHIL NFR BLD AUTO: 5.2 % — SIGNIFICANT CHANGE UP (ref 0–6)
ETHANOL SERPL-MCNC: <10 MG/DL — SIGNIFICANT CHANGE UP
GAS PNL BLDV: 137 MMOL/L — SIGNIFICANT CHANGE UP (ref 136–145)
GLUCOSE BLDV-MCNC: 96 MG/DL — SIGNIFICANT CHANGE UP (ref 70–99)
GLUCOSE SERPL-MCNC: 102 MG/DL — HIGH (ref 70–99)
HCO3 BLDV-SCNC: 34 MMOL/L — HIGH (ref 22–29)
HCT VFR BLD CALC: 41.8 % — SIGNIFICANT CHANGE UP (ref 39–50)
HCT VFR BLDA CALC: 40 % — SIGNIFICANT CHANGE UP (ref 39–51)
HGB BLD CALC-MCNC: 13.2 G/DL — SIGNIFICANT CHANGE UP (ref 13–17)
HGB BLD-MCNC: 12.9 G/DL — LOW (ref 13–17)
IANC: 3.72 K/UL — SIGNIFICANT CHANGE UP (ref 1.5–8.5)
IMM GRANULOCYTES NFR BLD AUTO: 0.5 % — SIGNIFICANT CHANGE UP (ref 0–1.5)
LACTATE BLDV-MCNC: 1.6 MMOL/L — SIGNIFICANT CHANGE UP (ref 0.5–2)
LYMPHOCYTES # BLD AUTO: 1.69 K/UL — SIGNIFICANT CHANGE UP (ref 1–3.3)
LYMPHOCYTES # BLD AUTO: 26.7 % — SIGNIFICANT CHANGE UP (ref 13–44)
MAGNESIUM SERPL-MCNC: 2.1 MG/DL — SIGNIFICANT CHANGE UP (ref 1.6–2.6)
MCHC RBC-ENTMCNC: 29.5 PG — SIGNIFICANT CHANGE UP (ref 27–34)
MCHC RBC-ENTMCNC: 30.9 GM/DL — LOW (ref 32–36)
MCV RBC AUTO: 95.7 FL — SIGNIFICANT CHANGE UP (ref 80–100)
METHADONE UR-MCNC: NEGATIVE — SIGNIFICANT CHANGE UP
MONOCYTES # BLD AUTO: 0.51 K/UL — SIGNIFICANT CHANGE UP (ref 0–0.9)
MONOCYTES NFR BLD AUTO: 8.1 % — SIGNIFICANT CHANGE UP (ref 2–14)
NEUTROPHILS # BLD AUTO: 3.72 K/UL — SIGNIFICANT CHANGE UP (ref 1.8–7.4)
NEUTROPHILS NFR BLD AUTO: 58.9 % — SIGNIFICANT CHANGE UP (ref 43–77)
NRBC # BLD: 0 /100 WBCS — SIGNIFICANT CHANGE UP
NRBC # FLD: 0 K/UL — SIGNIFICANT CHANGE UP
OPIATES UR-MCNC: NEGATIVE — SIGNIFICANT CHANGE UP
OXYCODONE UR-MCNC: POSITIVE
PCO2 BLDV: 68 MMHG — HIGH (ref 42–55)
PCP SPEC-MCNC: SIGNIFICANT CHANGE UP
PCP SPEC-MCNC: SIGNIFICANT CHANGE UP
PCP UR-MCNC: NEGATIVE — SIGNIFICANT CHANGE UP
PH BLDV: 7.3 — LOW (ref 7.32–7.43)
PHOSPHATE SERPL-MCNC: 3.4 MG/DL — SIGNIFICANT CHANGE UP (ref 2.5–4.5)
PLATELET # BLD AUTO: 177 K/UL — SIGNIFICANT CHANGE UP (ref 150–400)
PO2 BLDV: 28 MMHG — SIGNIFICANT CHANGE UP
POTASSIUM BLDV-SCNC: 5.5 MMOL/L — HIGH (ref 3.5–5.1)
POTASSIUM SERPL-MCNC: 5.5 MMOL/L — HIGH (ref 3.5–5.3)
POTASSIUM SERPL-SCNC: 5.5 MMOL/L — HIGH (ref 3.5–5.3)
PROT SERPL-MCNC: 7.4 G/DL — SIGNIFICANT CHANGE UP (ref 6–8.3)
RBC # BLD: 4.37 M/UL — SIGNIFICANT CHANGE UP (ref 4.2–5.8)
RBC # FLD: 14.8 % — HIGH (ref 10.3–14.5)
SALICYLATES SERPL-MCNC: <0.3 MG/DL — LOW (ref 15–30)
SAO2 % BLDV: 41.3 % — SIGNIFICANT CHANGE UP
SODIUM SERPL-SCNC: 142 MMOL/L — SIGNIFICANT CHANGE UP (ref 135–145)
THC UR QL: POSITIVE
TOXICOLOGY SCREEN, DRUGS OF ABUSE, SERUM RESULT: SIGNIFICANT CHANGE UP
WBC # BLD: 6.32 K/UL — SIGNIFICANT CHANGE UP (ref 3.8–10.5)
WBC # FLD AUTO: 6.32 K/UL — SIGNIFICANT CHANGE UP (ref 3.8–10.5)

## 2022-03-17 PROCEDURE — 99285 EMERGENCY DEPT VISIT HI MDM: CPT

## 2022-03-17 RX ORDER — NALOXONE HYDROCHLORIDE 4 MG/.1ML
0.4 SPRAY NASAL ONCE
Refills: 0 | Status: COMPLETED | OUTPATIENT
Start: 2022-03-17 | End: 2022-03-17

## 2022-03-17 RX ADMIN — NALOXONE HYDROCHLORIDE 0.4 MILLIGRAM(S): 4 SPRAY NASAL at 11:28

## 2022-03-17 NOTE — ED ADULT NURSE REASSESSMENT NOTE - NS ED NURSE REASSESS COMMENT FT1
pt is currently resting. respirations are even and un labored. PCA AT bedside at all times. clothing put by cabinet 21 and belongings brought to security.

## 2022-03-17 NOTE — ED ADULT NURSE NOTE - NSIMPLEMENTINTERV_GEN_ALL_ED
Implemented All Fall Risk Interventions:  [place] to call system. Call bell, personal items and telephone within reach. Instruct patient to call for assistance. Room bathroom lighting operational. Non-slip footwear when patient is off stretcher. Physically safe environment: no spills, clutter or unnecessary equipment. Stretcher in lowest position, wheels locked, appropriate side rails in place. Provide visual cue, wrist band, yellow gown, etc. Monitor gait and stability. Monitor for mental status changes and reorient to person, place, and time. Review medications for side effects contributing to fall risk. Reinforce activity limits and safety measures with patient and family.

## 2022-03-17 NOTE — ED PROVIDER NOTE - PROGRESS NOTE DETAILS
Lito Merritt PGY2: Collateral received by Mrs. Petty, Floor RN at NH: States pt is usually A&0x3, ambulatory, and conversive. Today at breakfast he looked unstable on his feet, was very lethargic, difficult to arouse, and slightly confused, which is why they called EMS. No known ingestion or drug use today. Marcell: Pt is A&Ox3, no acute complaints, tolerating PO Lito Merritt PGY2: Pt was re-evaluated at bedside, VSS, feeling better overall, ambulatory w/o assistance, tolerating PO. Results were discussed with patient as well as return precautions and follow up plan with PCP and/or specialist. Time was taken to answer any questions that the patient had before providing them with discharge paperwork.

## 2022-03-17 NOTE — ED ADULT NURSE NOTE - OBJECTIVE STATEMENT
brought in by senior care EMS. MD Peralta at bedside. pt has snoring respirations, not responsive to painful stimuli or verbal stimuli. abd is soft and non distended. skin intact. USIV placed to right arm. positive cellulitis to bilateral lower legs. medication given as ordered. placed on cardiac monitor, NSR. EKG obtained. safety precautions maintained. constant observation initiated, PCA Myrna at bedside

## 2022-03-17 NOTE — PROVIDER CONTACT NOTE (OTHER) - SITUATION
TIANNA informed by ED provider patient is medically cleared to return to [facility], [address] [place] [address], [phone]. TIANNA spoke with the evening nurse from the facility to

## 2022-03-17 NOTE — ED ADULT NURSE REASSESSMENT NOTE - NS ED NURSE REASSESS COMMENT FT1
pt is awake and agitated. pt states he "wants to go home." MD Ring made aware. PCA at bedside at all times.

## 2022-03-17 NOTE — ED ADULT NURSE REASSESSMENT NOTE - NS ED NURSE REASSESS COMMENT FT1
updated report received from break coverage RN. CO was discontinued. pt is discharged and waiting for transport home. pt is agitated and pacing the room.

## 2022-03-17 NOTE — ED PROVIDER NOTE - NSFOLLOWUPINSTRUCTIONS_ED_ALL_ED_FT
Please follow up with your PCP, Dr. Wise, within 2-3 days.    Please attempt to reduce sedating medications.    Return to Emergency Room if develop worsening shortness of breath, chest pain, difficulty arousing, fevers, unresponsiveness, or any other new concerning symptoms.

## 2022-03-17 NOTE — ED ADULT NURSE REASSESSMENT NOTE - NS ED NURSE REASSESS COMMENT FT1
pt is alert and oriented. pt is talking in full coherent sentences. lunch provided. PCA at bedside at all times. pt is alert and oriented. pt is talking in full coherent sentences, pulling lines and is agitated. lunch provided. PCA at bedside at all times.

## 2022-03-17 NOTE — ED PROVIDER NOTE - PHYSICAL EXAMINATION
PHYSICAL EXAM:  GENERAL: lethargic, difficult to arouse, and minimally responsive.  HEENT: Atraumatic, Normocephalic, PERRL, DMM  NECK: No JVD; FROM  CHEST/LUNG: CTAB no wheezes/rhonchi/rales  HEART: RRR no murmur/gallops/rubs  ABDOMEN: +BS, soft, NT, ND  EXTREMITIES: No LE edema, +2 radial pulses b/l, +2 DP/PT pulses b/l  MUSCULOSKELETAL: FROM of all 4 extremities  NERVOUS SYSTEM:  A&Ox0, lethargic, HILL spontaneously  SKIN:  Healing RLE cellulitis; no s/s of infection PHYSICAL EXAM:  GENERAL: lethargic, difficult to arouse, and minimally responsive.  HEENT: Atraumatic, Normocephalic, PERRL, DMM  NECK: No JVD; FROM  CHEST/LUNG: CTAB no wheezes/rhonchi/rales  HEART: RRR no murmur/gallops/rubs  ABDOMEN: +BS, soft, NT, ND  EXTREMITIES: No LE edema, +2 radial pulses b/l, +2 DP/PT pulses b/l  MUSCULOSKELETAL: FROM of all 4 extremities  NERVOUS SYSTEM:  A&Ox0, lethargic, HILL spontaneously  SKIN:  Healing RLE cellulitis; no s/s of infection    Attending/Marcell: lethargic; PERRL/EOMI, non-icterus, supple, no JAYDEN, no JVD, RRR, CTAB; Abd-soft, NT/ND, no HSM; no LE edema, P/AVPU, nonfocal; Skin-warm/dry

## 2022-03-17 NOTE — ED ADULT NURSE NOTE - CHIEF COMPLAINT QUOTE
pt has opioid dependent brought to area#24 pt arrived initially to triage responding during triage pt became more lethargic brought directly to #24

## 2022-03-17 NOTE — ED PROVIDER NOTE - PATIENT PORTAL LINK FT
You can access the FollowMyHealth Patient Portal offered by [facility] by registering at the following website: [email or web address]. By joining [employer]’s FollowMyHealth portal, you will also be able to view your health information using other applications (apps) compatible with our system.

## 2022-03-17 NOTE — PROVIDER CONTACT NOTE (OTHER) - BACKGROUND
to confirm patient's return. As per nurse the patient can return via ambulette. TIANNA arranged transportation with mas conf# [id number] via [facility] [phone] with a  time of

## 2022-03-17 NOTE — ED PROVIDER NOTE - OBJECTIVE STATEMENT
55 y/o M, PMH of Schizophrenia, DM2, HTN, COPD, ALEJANDRO, and opoid dependence, BIBEMS from NH for lethargy and difficulty breathing. Upon presentation pt is minimally responsive and unable to provide hx. Per EMS, they were called to NH by RN who stated pt, whom baseline is A&Ox3, was lethargic and having difficulty breathing. Unclear if there were any prior ingestion or drug use. Pt was not given narcan en route. 55 y/o M, PMH of Schizophrenia, DM2, HTN, COPD, ALEJANDRO, and opoid dependence, BIBEMS from NH for lethargy and difficulty breathing. Upon presentation pt is minimally responsive and unable to provide hx. Per EMS, they were called to NH by RN who stated pt, whom baseline is A&Ox3, was lethargic and having difficulty breathing. Unclear if there were any prior ingestion or drug use. Pt was not given narcan en route.    Attending/Marcell: 57 yo M h/o schizophrenia, DDM, COPD, ALEJANDRO, opoid dependence (Percocet), BIBEMS for lethargy as noted above. Pt current receiving Abx for RLE cellulitis.

## 2022-03-17 NOTE — ED ADULT TRIAGE NOTE - CHIEF COMPLAINT QUOTE
pt has opioid dependent pt has opioid dependent brought to area#24 pt arrived initially to triage responding during triage pt became more lethargic brought directly to #24

## 2022-03-17 NOTE — ED PROVIDER NOTE - CLINICAL SUMMARY MEDICAL DECISION MAKING FREE TEXT BOX
55 y/o M, PMH of Schizophrenia, DM2, HTN, COPD, ALEJANDRO, and opoid dependence, BIBEMS from NH for lethargy and difficulty breathing.  Upon arrival pt is lethargic, difficult to arouse, and minimally responsive. VSS. Pt belly breathing and snoring. Will attempt narcan.  Will also check basic labs, VBG, EKG, and tox screen. Reassess.

## 2022-04-14 NOTE — BEHAVIORAL HEALTH ASSESSMENT NOTE - NS ED BHA MED ROS EYES
Anorectal Manometry [facility] Hospital Study - RN Procedure Notes    Catheter:  12 Fr. 3D High Resolution [id number]  (Utilized because of patient's anorectal surgery hx.)    Rectal Exam:   Perianal Skin Integrity: Fully intact    Squeeze Study:   5 second brief squeeze maneuvers X 3:  Completed  Endurance Squeeze X 45 seconds:  Completed    Effort: Appropriate  Buttock Tone: Appropriate    Cough/Blow Study X 2: Complete    Push Study:  15 second Push maneuvers X 3:  Complete  Amount of Air Instilled for Push effort: 20cc  Effort:   Appropriate  Abdominal Tone:  Appropriate    Sensation:    First Sense:  35cc  Urge: 50cc  Maximum Toleration:  80cc    Balloon Expulsion:  Maneuver Successful:  No  Volume:  30cc  Time for Expulsion:   > 3 minutes    Data collected by:  Soo Robles RN and Bladimir Nagy RN     RN Note    Procedure:   Anorectal Manometry with Rectal Sensory Test and RAIR    Date of Procedure:   April 14, 2022    Shine Chua  MRN# [id number]  YOB: 2015            RN/Assistant:          Soo Robles RN and Bladimir Nagy RN    Ordering Provider:  Mane Knutson NP                Sedation:                None    Indication: Shine is a 6 year old female with a history of chronic constipation, 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    Medications at time of testing:   No current facility-administered medications for this encounter.     Current Outpatient Medications   Medication Sig     polyethylene glycol (MIRALAX) 17 GM/Dose powder Take 17 g by mouth daily     ascorbic acid (VITAMIN C) 250 MG CHEW Take 250 mg by mouth every evening Takes 1/2 tab     lactobacillus rhamnosus, GG, (CULTURELL) capsule Take 1 capsule by mouth every evening      multivitamin  peds with iron (FLINTSTONES COMPLETE) 60 MG chewable tablet Take 1 chew tab by mouth every evening      Probiotic Product (PROBIOTIC PO)  (Patient not taking: Reported on  3/25/2022)     SULFATRIM PEDIATRIC 200-40 MG/5ML suspension SHAKE LIQUID AND GIVE 10 ML BY MOUTH TWICE DAILY FOR 10 DAYS       The risks and benefits of the procedure were discussed with the patient and/or parent(s). All questions were answered and informed consent was obtained. Patient identification and proposed procedure were verified by the nurse/assistant in the patient room.     Patient cooperated during the procedure well.    Rectal exam: Normal tone and no stool with balloon insertion.      RN Note: Shine is a delightful 6 year old we had the pleasure of encountering today for an anorectal manometry exam.  While discussing the procedural process with her and her parents, the parents stated they believed she would be able to tolerate the exam without sedation, but reserved the rights to request sedation if the procedure was too uncomfortable.  RNs agreed that was a good plan.    Shine was able to follow all instructions and fully participate in the exam.  Appeared comfortable and cooperative until the maximum tolerance portion at which point she became uncomfortable.  She verbally expressed discomfort and was tearful but remained coperative throughout the rest of the exam.  Balloon was removed and reinserted to confirm placement and avoid artifact readings.  RAIR readings initially appeared improved post-reinsertion.    Upon completion of exam, no blood and scant stool was present on balloon, which was intact.  Patient expressed no ongoing discomfort post-exam.  Parents given discharge instructions including potential side-effects and phone numbers to call.    Bladimir Nagy RN           No complaints

## 2022-04-15 ENCOUNTER — APPOINTMENT (OUTPATIENT)
Dept: PULMONOLOGY | Facility: CLINIC | Age: 57
End: 2022-04-15
Payer: MEDICARE

## 2022-04-15 VITALS — HEART RATE: 94 BPM | OXYGEN SATURATION: 94 %

## 2022-04-15 VITALS
BODY MASS INDEX: 40.43 KG/M2 | TEMPERATURE: 98 F | HEART RATE: 92 BPM | DIASTOLIC BLOOD PRESSURE: 80 MMHG | OXYGEN SATURATION: 88 % | SYSTOLIC BLOOD PRESSURE: 126 MMHG | WEIGHT: 315 LBS | HEIGHT: 74 IN

## 2022-04-15 DIAGNOSIS — Z86.59 PERSONAL HISTORY OF OTHER MENTAL AND BEHAVIORAL DISORDERS: ICD-10-CM

## 2022-04-15 DIAGNOSIS — Z87.19 PERSONAL HISTORY OF OTHER DISEASES OF THE DIGESTIVE SYSTEM: ICD-10-CM

## 2022-04-15 DIAGNOSIS — Z87.09 PERSONAL HISTORY OF OTHER DISEASES OF THE RESPIRATORY SYSTEM: ICD-10-CM

## 2022-04-15 DIAGNOSIS — J44.9 CHRONIC OBSTRUCTIVE PULMONARY DISEASE, UNSPECIFIED: ICD-10-CM

## 2022-04-15 DIAGNOSIS — F17.200 NICOTINE DEPENDENCE, UNSPECIFIED, UNCOMPLICATED: ICD-10-CM

## 2022-04-15 DIAGNOSIS — I10 ESSENTIAL (PRIMARY) HYPERTENSION: ICD-10-CM

## 2022-04-15 PROCEDURE — 99213 OFFICE O/P EST LOW 20 MIN: CPT

## 2022-04-15 PROCEDURE — 99203 OFFICE O/P NEW LOW 30 MIN: CPT

## 2022-04-18 PROBLEM — Z87.09 HISTORY OF CHRONIC OBSTRUCTIVE LUNG DISEASE: Status: RESOLVED | Noted: 2022-04-18 | Resolved: 2022-04-18

## 2022-04-18 PROBLEM — Z87.19 HISTORY OF GASTROESOPHAGEAL REFLUX (GERD): Status: RESOLVED | Noted: 2022-04-18 | Resolved: 2022-04-18

## 2022-04-18 PROBLEM — Z86.59 PERSONAL HISTORY OF SCHIZOPHRENIA: Status: RESOLVED | Noted: 2022-04-18 | Resolved: 2022-04-18

## 2022-04-18 PROBLEM — F17.200 CURRENT SMOKER: Status: ACTIVE | Noted: 2022-04-18

## 2022-04-18 PROBLEM — I10 HTN (HYPERTENSION), BENIGN: Status: RESOLVED | Noted: 2022-04-18 | Resolved: 2022-04-18

## 2022-04-18 PROBLEM — J44.9 COPD (CHRONIC OBSTRUCTIVE PULMONARY DISEASE): Status: ACTIVE | Noted: 2022-04-18

## 2022-04-18 RX ORDER — PREDNISONE 10 MG/1
10 TABLET ORAL
Qty: 25 | Refills: 0 | Status: DISCONTINUED | COMMUNITY
Start: 2022-02-08 | End: 2022-04-18

## 2022-04-18 RX ORDER — SULFAMETHOXAZOLE AND TRIMETHOPRIM 800; 160 MG/1; MG/1
800-160 TABLET ORAL
Qty: 14 | Refills: 0 | Status: ACTIVE | COMMUNITY
Start: 2022-03-14

## 2022-04-18 RX ORDER — VENLAFAXINE HYDROCHLORIDE 150 MG/1
150 CAPSULE, EXTENDED RELEASE ORAL
Refills: 0 | Status: ACTIVE | COMMUNITY

## 2022-04-18 RX ORDER — PIPERACILLIN SODIUM AND TAZOBACTAM SODIUM 3; .375 G/15ML; G/15ML
3.375 (3-0.375) INJECTION, POWDER, FOR SOLUTION INTRAVENOUS
Qty: 9 | Refills: 0 | Status: DISCONTINUED | COMMUNITY
Start: 2022-03-14

## 2022-04-18 RX ORDER — VENLAFAXINE HYDROCHLORIDE 75 MG/1
75 CAPSULE, EXTENDED RELEASE ORAL
Qty: 14 | Refills: 0 | Status: ACTIVE | COMMUNITY
Start: 2022-04-11

## 2022-04-18 RX ORDER — AMOXICILLIN 500 MG/1
500 CAPSULE ORAL
Qty: 15 | Refills: 0 | Status: ACTIVE | COMMUNITY
Start: 2022-02-18

## 2022-04-18 RX ORDER — ACETAMINOPHEN 325 MG
325 SUPPOSITORY, RECTAL RECTAL
Refills: 0 | Status: ACTIVE | COMMUNITY

## 2022-04-18 RX ORDER — NITROGLYCERIN 0.4 MG/1
0.4 TABLET SUBLINGUAL
Qty: 25 | Refills: 0 | Status: ACTIVE | COMMUNITY
Start: 2022-01-29

## 2022-04-18 RX ORDER — DEXTROSE MONOHYDRATE 25000 MG/500ML
5 INJECTION, SOLUTION INTRAVENOUS
Qty: 450 | Refills: 0 | Status: ACTIVE | COMMUNITY
Start: 2022-03-14

## 2022-04-18 RX ORDER — LIDOCAINE HYDROCHLORIDE 10 MG/ML
1 INJECTION, SOLUTION INFILTRATION; PERINEURAL
Qty: 10 | Refills: 0 | Status: ACTIVE | COMMUNITY
Start: 2022-02-17

## 2022-04-18 RX ORDER — DIVALPROEX SODIUM 500 MG/1
500 TABLET, DELAYED RELEASE ORAL
Refills: 0 | Status: ACTIVE | COMMUNITY

## 2022-04-18 RX ORDER — BACLOFEN 5 MG/1
5 TABLET ORAL
Refills: 0 | Status: ACTIVE | COMMUNITY

## 2022-04-18 RX ORDER — QUETIAPINE FUMARATE 25 MG/1
25 TABLET ORAL
Qty: 14 | Refills: 0 | Status: ACTIVE | COMMUNITY
Start: 2021-12-19

## 2022-04-18 RX ORDER — METHYLPRED ACET/NACL,ISO-OS/PF 40 MG/ML
40 VIAL (ML) INJECTION
Qty: 1 | Refills: 0 | Status: DISCONTINUED | COMMUNITY
Start: 2022-02-11 | End: 2022-04-18

## 2022-04-18 RX ORDER — BENZONATATE 100 MG/1
100 CAPSULE ORAL
Refills: 0 | Status: ACTIVE | COMMUNITY

## 2022-04-18 RX ORDER — DIVALPROEX SODIUM 500 1/1
500 TABLET, EXTENDED RELEASE ORAL
Qty: 28 | Refills: 0 | Status: ACTIVE | COMMUNITY
Start: 2022-04-10

## 2022-04-18 RX ORDER — LOSARTAN POTASSIUM 100 MG/1
100 TABLET, FILM COATED ORAL
Qty: 5 | Refills: 0 | Status: ACTIVE | COMMUNITY
Start: 2022-01-15

## 2022-04-18 RX ORDER — PALIPERIDONE PALMITATE 234 MG/1.5ML
234 INJECTION INTRAMUSCULAR
Qty: 2 | Refills: 0 | Status: ACTIVE | COMMUNITY
Start: 2022-04-08

## 2022-04-18 RX ORDER — LOSARTAN POTASSIUM 50 MG/1
50 TABLET, FILM COATED ORAL
Qty: 14 | Refills: 0 | Status: ACTIVE | COMMUNITY
Start: 2021-12-15

## 2022-04-18 RX ORDER — PANTOPRAZOLE 40 MG/1
40 TABLET, DELAYED RELEASE ORAL
Refills: 0 | Status: ACTIVE | COMMUNITY

## 2022-04-18 RX ORDER — PREDNISONE 20 MG/1
20 TABLET ORAL
Qty: 28 | Refills: 0 | Status: ACTIVE | COMMUNITY
Start: 2021-12-15

## 2022-04-18 RX ORDER — OXYCODONE AND ACETAMINOPHEN 5; 325 MG/1; MG/1
5-325 TABLET ORAL
Qty: 60 | Refills: 0 | Status: ACTIVE | COMMUNITY
Start: 2022-03-07

## 2022-04-18 RX ORDER — AMLODIPINE BESYLATE 10 MG/1
10 TABLET ORAL
Refills: 0 | Status: ACTIVE | COMMUNITY

## 2022-04-18 RX ORDER — TAMSULOSIN HYDROCHLORIDE 0.4 MG/1
0.4 CAPSULE ORAL
Refills: 0 | Status: ACTIVE | COMMUNITY

## 2022-04-18 RX ORDER — VANCOMYCIN 1 G/200ML
1000 INJECTION, SOLUTION INTRAVENOUS
Qty: 2000 | Refills: 0 | Status: ACTIVE | COMMUNITY
Start: 2022-03-11

## 2022-04-18 RX ORDER — VALACYCLOVIR 1 G/1
1 TABLET, FILM COATED ORAL
Qty: 10 | Refills: 0 | Status: ACTIVE | COMMUNITY
Start: 2021-12-29

## 2022-04-18 RX ORDER — ALBUTEROL SULFATE 108 UG/1
AEROSOL, METERED RESPIRATORY (INHALATION)
Refills: 0 | Status: ACTIVE | COMMUNITY

## 2022-04-18 RX ORDER — FUROSEMIDE 20 MG/1
20 TABLET ORAL
Refills: 0 | Status: ACTIVE | COMMUNITY

## 2022-04-21 NOTE — ED BEHAVIORAL HEALTH NOTE - BEHAVIORAL HEALTH NOTE
========================  COLLATERAL RETURNED PHONE CALL  ========================  NAME: Joseph Sandoval  NUMBER: [phone]  RELATIONSHIP: Brother   RELIABILITY: Fair-poor  COMMENTS: Infrequent contact    HPI:    Patient’s brother, whose reliability is limited by both the infrequency of contact with patient states that last week he received a phone call from patient stating that he had been kicked out of his apartment through [employer] due to disagreements with his . Brother states that patient has been living in the streets ever since he was kicked out of his apartment. He states that around the same time last week, after meeting with his , patient stated that he lost all of his personal belongings and the "bloods" were waiting for him. Brother relays that he believes patient is paranoid, not taking his medications and would benefit from a hospitalization. He denies a history of ETOH or illicit drug use; but states that patient over uses his Oxycodone which is prescribed for pain. No further information provided. Alert and oriented to person, place and time

## 2022-05-09 NOTE — ED ADULT TRIAGE NOTE - AS O2 DELIVERY
[FreeTextEntry1] : WDWN.  In no acute distress.\par Skin in RT portals intact.\par No erythema.\par 
room air

## 2022-05-12 ENCOUNTER — INPATIENT (INPATIENT)
Facility: HOSPITAL | Age: 57
LOS: 3 days | Discharge: NURSING, REHAB OR LONG-TERM CARE | End: 2022-05-16
Attending: INTERNAL MEDICINE | Admitting: INTERNAL MEDICINE
Payer: MEDICARE

## 2022-05-12 VITALS
OXYGEN SATURATION: 99 % | WEIGHT: 315 LBS | TEMPERATURE: 98 F | SYSTOLIC BLOOD PRESSURE: 125 MMHG | HEIGHT: 74 IN | DIASTOLIC BLOOD PRESSURE: 73 MMHG | RESPIRATION RATE: 16 BRPM | HEART RATE: 77 BPM

## 2022-05-12 DIAGNOSIS — I50.32 CHRONIC DIASTOLIC (CONGESTIVE) HEART FAILURE: ICD-10-CM

## 2022-05-12 DIAGNOSIS — J44.9 CHRONIC OBSTRUCTIVE PULMONARY DISEASE, UNSPECIFIED: ICD-10-CM

## 2022-05-12 DIAGNOSIS — Z90.49 ACQUIRED ABSENCE OF OTHER SPECIFIED PARTS OF DIGESTIVE TRACT: Chronic | ICD-10-CM

## 2022-05-12 DIAGNOSIS — R41.0 DISORIENTATION, UNSPECIFIED: ICD-10-CM

## 2022-05-12 DIAGNOSIS — R41.82 ALTERED MENTAL STATUS, UNSPECIFIED: ICD-10-CM

## 2022-05-12 DIAGNOSIS — Z29.9 ENCOUNTER FOR PROPHYLACTIC MEASURES, UNSPECIFIED: ICD-10-CM

## 2022-05-12 DIAGNOSIS — G47.33 OBSTRUCTIVE SLEEP APNEA (ADULT) (PEDIATRIC): ICD-10-CM

## 2022-05-12 DIAGNOSIS — F25.9 SCHIZOAFFECTIVE DISORDER, UNSPECIFIED: ICD-10-CM

## 2022-05-12 LAB
ALBUMIN SERPL ELPH-MCNC: 4.1 G/DL — SIGNIFICANT CHANGE UP (ref 3.3–5)
ALP SERPL-CCNC: 100 U/L — SIGNIFICANT CHANGE UP (ref 40–120)
ALT FLD-CCNC: 37 U/L — SIGNIFICANT CHANGE UP (ref 4–41)
ANION GAP SERPL CALC-SCNC: 10 MMOL/L — SIGNIFICANT CHANGE UP (ref 7–14)
AST SERPL-CCNC: 31 U/L — SIGNIFICANT CHANGE UP (ref 4–40)
BASE EXCESS BLDV CALC-SCNC: 8 MMOL/L — HIGH (ref -2–3)
BASOPHILS # BLD AUTO: 0.05 K/UL — SIGNIFICANT CHANGE UP (ref 0–0.2)
BASOPHILS NFR BLD AUTO: 0.6 % — SIGNIFICANT CHANGE UP (ref 0–2)
BILIRUB SERPL-MCNC: 0.3 MG/DL — SIGNIFICANT CHANGE UP (ref 0.2–1.2)
BLOOD GAS VENOUS COMPREHENSIVE RESULT: SIGNIFICANT CHANGE UP
BUN SERPL-MCNC: 16 MG/DL — SIGNIFICANT CHANGE UP (ref 7–23)
CALCIUM SERPL-MCNC: 10.9 MG/DL — HIGH (ref 8.4–10.5)
CHLORIDE BLDV-SCNC: 104 MMOL/L — SIGNIFICANT CHANGE UP (ref 96–108)
CHLORIDE SERPL-SCNC: 102 MMOL/L — SIGNIFICANT CHANGE UP (ref 98–107)
CO2 BLDV-SCNC: 36.4 MMOL/L — HIGH (ref 22–26)
CO2 SERPL-SCNC: 29 MMOL/L — SIGNIFICANT CHANGE UP (ref 22–31)
CREAT SERPL-MCNC: 0.82 MG/DL — SIGNIFICANT CHANGE UP (ref 0.5–1.3)
EGFR: 102 ML/MIN/1.73M2 — SIGNIFICANT CHANGE UP
EOSINOPHIL # BLD AUTO: 0.27 K/UL — SIGNIFICANT CHANGE UP (ref 0–0.5)
EOSINOPHIL NFR BLD AUTO: 3.4 % — SIGNIFICANT CHANGE UP (ref 0–6)
GAS PNL BLDV: 137 MMOL/L — SIGNIFICANT CHANGE UP (ref 136–145)
GLUCOSE BLDV-MCNC: 94 MG/DL — SIGNIFICANT CHANGE UP (ref 70–99)
GLUCOSE SERPL-MCNC: 94 MG/DL — SIGNIFICANT CHANGE UP (ref 70–99)
HCO3 BLDV-SCNC: 35 MMOL/L — HIGH (ref 22–29)
HCT VFR BLD CALC: 41.5 % — SIGNIFICANT CHANGE UP (ref 39–50)
HCT VFR BLDA CALC: 40 % — SIGNIFICANT CHANGE UP (ref 39–51)
HGB BLD CALC-MCNC: 13.4 G/DL — SIGNIFICANT CHANGE UP (ref 13–17)
HGB BLD-MCNC: 13.1 G/DL — SIGNIFICANT CHANGE UP (ref 13–17)
IANC: 4.98 K/UL — SIGNIFICANT CHANGE UP (ref 1.8–7.4)
IMM GRANULOCYTES NFR BLD AUTO: 0.3 % — SIGNIFICANT CHANGE UP (ref 0–1.5)
LACTATE BLDV-MCNC: 1.5 MMOL/L — SIGNIFICANT CHANGE UP (ref 0.5–2)
LYMPHOCYTES # BLD AUTO: 1.81 K/UL — SIGNIFICANT CHANGE UP (ref 1–3.3)
LYMPHOCYTES # BLD AUTO: 23.1 % — SIGNIFICANT CHANGE UP (ref 13–44)
MCHC RBC-ENTMCNC: 29.7 PG — SIGNIFICANT CHANGE UP (ref 27–34)
MCHC RBC-ENTMCNC: 31.6 GM/DL — LOW (ref 32–36)
MCV RBC AUTO: 94.1 FL — SIGNIFICANT CHANGE UP (ref 80–100)
MONOCYTES # BLD AUTO: 0.71 K/UL — SIGNIFICANT CHANGE UP (ref 0–0.9)
MONOCYTES NFR BLD AUTO: 9.1 % — SIGNIFICANT CHANGE UP (ref 2–14)
NEUTROPHILS # BLD AUTO: 4.98 K/UL — SIGNIFICANT CHANGE UP (ref 1.8–7.4)
NEUTROPHILS NFR BLD AUTO: 63.5 % — SIGNIFICANT CHANGE UP (ref 43–77)
NRBC # BLD: 0 /100 WBCS — SIGNIFICANT CHANGE UP
NRBC # FLD: 0 K/UL — SIGNIFICANT CHANGE UP
NT-PROBNP SERPL-SCNC: 40 PG/ML — SIGNIFICANT CHANGE UP
PCO2 BLDV: 56 MMHG — HIGH (ref 42–55)
PH BLDV: 7.4 — SIGNIFICANT CHANGE UP (ref 7.32–7.43)
PLATELET # BLD AUTO: 200 K/UL — SIGNIFICANT CHANGE UP (ref 150–400)
PO2 BLDV: 50 MMHG — SIGNIFICANT CHANGE UP
POTASSIUM BLDV-SCNC: 4.9 MMOL/L — SIGNIFICANT CHANGE UP (ref 3.5–5.1)
POTASSIUM SERPL-MCNC: 4.1 MMOL/L — SIGNIFICANT CHANGE UP (ref 3.5–5.3)
POTASSIUM SERPL-SCNC: 4.1 MMOL/L — SIGNIFICANT CHANGE UP (ref 3.5–5.3)
PROT SERPL-MCNC: 7.3 G/DL — SIGNIFICANT CHANGE UP (ref 6–8.3)
RBC # BLD: 4.41 M/UL — SIGNIFICANT CHANGE UP (ref 4.2–5.8)
RBC # FLD: 14.3 % — SIGNIFICANT CHANGE UP (ref 10.3–14.5)
SAO2 % BLDV: 81.4 % — SIGNIFICANT CHANGE UP
SARS-COV-2 RNA SPEC QL NAA+PROBE: SIGNIFICANT CHANGE UP
SODIUM SERPL-SCNC: 141 MMOL/L — SIGNIFICANT CHANGE UP (ref 135–145)
WBC # BLD: 7.84 K/UL — SIGNIFICANT CHANGE UP (ref 3.8–10.5)
WBC # FLD AUTO: 7.84 K/UL — SIGNIFICANT CHANGE UP (ref 3.8–10.5)

## 2022-05-12 PROCEDURE — 99223 1ST HOSP IP/OBS HIGH 75: CPT

## 2022-05-12 PROCEDURE — 93010 ELECTROCARDIOGRAM REPORT: CPT

## 2022-05-12 PROCEDURE — 71045 X-RAY EXAM CHEST 1 VIEW: CPT | Mod: 26

## 2022-05-12 PROCEDURE — 99285 EMERGENCY DEPT VISIT HI MDM: CPT | Mod: 25

## 2022-05-12 RX ORDER — TAMSULOSIN HYDROCHLORIDE 0.4 MG/1
0.4 CAPSULE ORAL AT BEDTIME
Refills: 0 | Status: DISCONTINUED | OUTPATIENT
Start: 2022-05-12 | End: 2022-05-16

## 2022-05-12 RX ORDER — FUROSEMIDE 40 MG
1 TABLET ORAL
Qty: 0 | Refills: 0 | DISCHARGE

## 2022-05-12 RX ORDER — SENNA PLUS 8.6 MG/1
2 TABLET ORAL AT BEDTIME
Refills: 0 | Status: DISCONTINUED | OUTPATIENT
Start: 2022-05-12 | End: 2022-05-16

## 2022-05-12 RX ORDER — ACETAMINOPHEN 500 MG
650 TABLET ORAL EVERY 6 HOURS
Refills: 0 | Status: DISCONTINUED | OUTPATIENT
Start: 2022-05-12 | End: 2022-05-16

## 2022-05-12 RX ORDER — BACLOFEN 100 %
1 POWDER (GRAM) MISCELLANEOUS
Qty: 0 | Refills: 0 | DISCHARGE

## 2022-05-12 RX ORDER — AMLODIPINE BESYLATE 2.5 MG/1
10 TABLET ORAL DAILY
Refills: 0 | Status: DISCONTINUED | OUTPATIENT
Start: 2022-05-12 | End: 2022-05-16

## 2022-05-12 RX ORDER — PANTOPRAZOLE SODIUM 20 MG/1
40 TABLET, DELAYED RELEASE ORAL
Refills: 0 | Status: DISCONTINUED | OUTPATIENT
Start: 2022-05-12 | End: 2022-05-16

## 2022-05-12 RX ORDER — SIMETHICONE 80 MG/1
80 TABLET, CHEWABLE ORAL
Refills: 0 | Status: DISCONTINUED | OUTPATIENT
Start: 2022-05-12 | End: 2022-05-16

## 2022-05-12 RX ORDER — VENLAFAXINE HCL 75 MG
1 CAPSULE, EXT RELEASE 24 HR ORAL
Qty: 0 | Refills: 0 | DISCHARGE

## 2022-05-12 RX ORDER — PALIPERIDONE 1.5 MG/1
234 TABLET, EXTENDED RELEASE ORAL
Qty: 0 | Refills: 0 | DISCHARGE

## 2022-05-12 RX ORDER — ALBUTEROL 90 UG/1
2 AEROSOL, METERED ORAL EVERY 6 HOURS
Refills: 0 | Status: DISCONTINUED | OUTPATIENT
Start: 2022-05-12 | End: 2022-05-16

## 2022-05-12 RX ORDER — FUROSEMIDE 40 MG
40 TABLET ORAL DAILY
Refills: 0 | Status: DISCONTINUED | OUTPATIENT
Start: 2022-05-12 | End: 2022-05-16

## 2022-05-12 RX ORDER — DIVALPROEX SODIUM 500 MG/1
1000 TABLET, DELAYED RELEASE ORAL DAILY
Refills: 0 | Status: DISCONTINUED | OUTPATIENT
Start: 2022-05-12 | End: 2022-05-16

## 2022-05-12 RX ORDER — VENLAFAXINE HCL 75 MG
75 CAPSULE, EXT RELEASE 24 HR ORAL DAILY
Refills: 0 | Status: DISCONTINUED | OUTPATIENT
Start: 2022-05-12 | End: 2022-05-16

## 2022-05-12 RX ORDER — BACLOFEN 100 %
5 POWDER (GRAM) MISCELLANEOUS EVERY 8 HOURS
Refills: 0 | Status: DISCONTINUED | OUTPATIENT
Start: 2022-05-12 | End: 2022-05-16

## 2022-05-12 RX ADMIN — TAMSULOSIN HYDROCHLORIDE 0.4 MILLIGRAM(S): 0.4 CAPSULE ORAL at 23:08

## 2022-05-12 RX ADMIN — Medication 100 MILLIGRAM(S): at 23:08

## 2022-05-12 RX ADMIN — SENNA PLUS 2 TABLET(S): 8.6 TABLET ORAL at 23:08

## 2022-05-12 RX ADMIN — Medication 1 MILLIGRAM(S): at 18:39

## 2022-05-12 RX ADMIN — ALBUTEROL 2 PUFF(S): 90 AEROSOL, METERED ORAL at 23:08

## 2022-05-12 NOTE — H&P ADULT - NSHPPHYSICALEXAM_GEN_ALL_CORE
Vital Signs Last 24 Hrs  T(C): 36.6 (12 May 2022 09:06), Max: 36.6 (12 May 2022 08:03)  T(F): 97.8 (12 May 2022 09:06), Max: 97.8 (12 May 2022 08:03)  HR: 93 (12 May 2022 12:20) (66 - 93)  BP: 147/89 (12 May 2022 12:20) (119/73 - 147/89)  BP(mean): --  RR: 20 (12 May 2022 12:20) (16 - 20)  SpO2: 100% (12 May 2022 12:20) (98% - 100%)  GENERAL: NAD, obese, snoring in bed before arousal  HEAD:  Atraumatic, Normocephalic  EYES: EOMI, conjunctiva and sclera clear  NECK: Supple, No JVD  CHEST/LUNG: Clear to auscultation bilaterally; No wheeze  HEART: Regular rate and rhythm; No murmurs, rubs, or gallops  ABDOMEN: Soft, Nontender, Nondistended; Bowel sounds present  EXTREMITIES:  2+ Peripheral Pulses, No clubbing, cyanosis, or edema  NEUROLOGY: non-focal  SKIN: No rashes or lesions

## 2022-05-12 NOTE — CHART NOTE - NSCHARTNOTEFT_GEN_A_CORE
MEDICINE PA NOTE     Called by RN for patient trying to elope and becoming increasingly agitated/ pulling IV  Constant observation ordered and will given Ativan 1mg IVP x 1 dose.   Will continue to monitor patient     Giacomo Centeno PA-C  Department of Medicine/ RCU  In house Beeper #[id number]  Reachable via teams

## 2022-05-12 NOTE — ED ADULT TRIAGE NOTE - CHIEF COMPLAINT QUOTE
Pt arrives to ED from [facility] with report of AMS found at 6AM with unknown last known well.  Hx of DM and schizophrenia. Pt uses Bipap at night.  Pt snoring in triage but woke up when questioned.  Pt at first wasn't sure what month it was but then said May/2022 and is more aware as he is awake.  Pt reports he is at [facility] for back rehab.  Pt doesn't know why he is here but recognizes he is in a hospital. fs = 122 in triage.  Pt strength equal to all extremities.

## 2022-05-12 NOTE — ED PROVIDER NOTE - ATTENDING CONTRIBUTION TO CARE
Pt was seen and evaluated by me. Pt is a 55 y/o male with PMhx of schizophrenia, COPD (bipap at night), and opioid dependence who presented to the ED for from [facility] for AMS. Pt was noted by nursing home to have some anxiety last night and was given Xanax. Pt was noted to have not used his Bipap and this morning was more tired than usual. Pt is somnolent but arousable. Denies any headache, fever, chills, nausea, vomiting, SOB, chest pain, or abd pain.  VITALS: Vitals have been reviewed.  GEN APPEARANCE: WDWN, somnolent but arousable, non-toxic appearing and in NAD  HEAD: Atraumatic, normocephalic.   EYES: PERRL, EOMI.   EARS: Gross hearing intact.   NOSE: No nasal discharge.   THROAT: MMM. Oral cavity and pharynx normal.   NECK: Supple, no lymphadenopathy  CV: RRR, S1S2, no c/r/m/g. No cyanosis or pallor. Extremities warm, well perfused. Cap refill <2 seconds. No bruits.   LUNGS: CTAB. No wheezing. No rales. No rhonchi. No diminished breath sounds.   ABDOMEN: Soft, NTND. No guarding or rebound.   MSK/EXT: Spine appears normal, no spine point tenderness. No CVA ttp. Normal muscular development. PELVIS: Stable. No obvious joint or bony deformity, no peripheral edema.   NEURO: Alert, follows commands. Speech normal. Sensation and motor normal x4 extremities.   SKIN: RLE cellulitis, present on previous documentation, scars to right forearm  55 y/o male with PMhx of schizophrenia, COPD (bipap at night), and opioid dependence who presented to the ED for from [facility] for AMS  Concern for opioid/Xanax reaction/hypercarbia secondary to non use of BiPap  Labs, EKG, CXR, Monitor

## 2022-05-12 NOTE — ED PROVIDER NOTE - PHYSICAL EXAMINATION
Vitals: I have reviewed the patients vital signs  General: nontoxic appearing  HEENT: Atraumatic, normocephalic, airway patent tolerating secretions  Eyes: EOMI, tracking appropriately, pinpoint pupils  Neck: no tracheal deviation, no JVD  Chest/Lungs: no trauma, symmetric chest rise, speaking in complete sentences, when asleep does have some snoring respirations however does have ALEJANDRO  Heart: skin and extremities well perfused, regular rate and rhythm  Neuro: somnolent and falls asleep quickly, however when answering questions is A+Ox3, ambulation deferred, 5/5 moving all extremities   MSK: strength at baseline in all extremities, no muscle wasting or atrophy  Skin: RLE cellulitis appears better than per previous chart.

## 2022-05-12 NOTE — ED ADULT NURSE NOTE - OBJECTIVE STATEMENT
pt received to room 23, pt A&Ox3, disoriented to situation. pt initially lethargic but responsive to  verbal stimuli. pt respirations even & labored, intermittently falling asleep & snoring. History C-PAP use. pt placed on capnography reading, reading 35.  Sent by nursing home due to altered mental status. Pt reassessed, now is awake & alert, responsive, denies c/p, SOB, palpitations, n/v, GI &  symptoms.

## 2022-05-12 NOTE — ED ADULT TRIAGE NOTE - NS ED NURSE BANDS TYPE
Stable. On medication. Discussed nutrition. Get regular exercise. Work on weight loss. Lab today. CPM. Assess in 6 mos   Name band;

## 2022-05-12 NOTE — H&P ADULT - ASSESSMENT
56M COPD (CPAP) schizophrenia hx opioid dependence (no MAT) HTN obesity BPH sent from [facility] with altered mental status.

## 2022-05-12 NOTE — ED PROVIDER NOTE - CLINICAL SUMMARY MEDICAL DECISION MAKING FREE TEXT BOX
Pt from NH with schizophrenia, ALEJANDRO/COPD on bipap at night, DM, opiate use disorder here from NH for tiredness, ams, difficulty rousing this morning. Similar in presensation to 2 months ago per chart. Pt nontoxic appearing, rousable to minimally noxious stimuli, when awake is A+Ox3, following commands, 5/5 all extremities. Exam notable for RLE cellulitis which was documented previously. No cp, sob, fevers. No hypoxia on RA. Will place on ETCO2 monitoring, will contact NH for collateral, vbg to eval for retention, xr for pna or other pulm lesion, narcan if needed, dc if does not require oxygen and mentation improves.

## 2022-05-12 NOTE — ED PROVIDER NOTE - CROS ED GU ALL NEG
Patient is alert and oriented x4 c/o chest tightness with SOB and wheezing x few days. diagnosed with pneumonia few days ago. Diminished breath sounds with slight expiratory wheeze noted. denies n/v/d. color normal for ethnicityy.. negative...

## 2022-05-12 NOTE — ED PROVIDER NOTE - CONSTITUTIONAL, MLM
Well appearing, somnolent but arousable, oriented to person, place, and time/situation, in no apparent distress. normal...

## 2022-05-12 NOTE — ED ADULT NURSE REASSESSMENT NOTE - NS ED NURSE REASSESS COMMENT FT1
Pt becoming increasingly agitated ripping off tele leads, oxygen and pulse ox. Pt refusing care. Pt states "I want my phone". ACP made aware. Ativan to be ordered

## 2022-05-12 NOTE — H&P ADULT - NSICDXPASTMEDICALHX_GEN_ALL_CORE_FT
PAST MEDICAL HISTORY:  COPD with asthma CPAP    Diabetes Type 2; pt lost weight, no longer taking meds    History of 2019 novel coronavirus disease (COVID-19) Oct 2021    Hypertension, unspecified type     Obstructive sleep apnea CPAP for 7-8 years    Schizoaffective disorder, bipolar type     Uncomplicated asthma, unspecified asthma severity

## 2022-05-12 NOTE — CHART NOTE - NSCHARTNOTEFT_GEN_A_CORE
CHIEF COMPLAINT:    SUBJECTIVE:     REVIEW OF SYSTEMS:    CONSTITUTIONAL: (  )  weakness,  (  ) fevers or chills  EYES/ENT: (  )visual changes;     NECK: (  ) pain or stiffness  RESPIRATORY:   (  )cough, wheezing, hemoptysis;  (  ) shortness of breath  CARDIOVASCULAR:  (  )chest pain or palpitations  GASTROINTESTINAL:   (  )abdominal or epigastric pain.  (  ) nausea, vomiting, or hematemesis;   (   ) diarrhea or constipation.   GENITOURINARY:   (    ) dysuria, frequency or hematuria  NEUROLOGICAL:  (   ) numbness or weakness   All other review of systems is negative unless indicated above    Vital Signs Last 24 Hrs  T(C): 36.6 (12 May 2022 09:06), Max: 36.6 (12 May 2022 08:03)  T(F): 97.8 (12 May 2022 09:06), Max: 97.8 (12 May 2022 08:03)  HR: 76 (12 May 2022 09:06) (66 - 77)  BP: 119/73 (12 May 2022 09:06) (119/73 - 125/73)  BP(mean): --  RR: 18 (12 May 2022 09:06) (16 - 18)  SpO2: 98% (12 May 2022 09:06) (98% - 99%)    I&O's Summary      CAPILLARY BLOOD GLUCOSE          PHYSICAL EXAM:    Constitutional:  (   ) NAD,   (   )awake and alert  HEENT: PERR, EOMI,    Neck: Soft and supple, No LAD, No JVD  Respiratory:  (    Breath sounds are clear bilaterally,    (   ) wheezing, rales or rhonchi  Cardiovascular:     (   )S1 and S2, regular rate and rhythm, no Murmurs, gallops or rubs  Gastrointestinal:  (   )Bowel Sounds present, soft,   (  )nontender, nondistended,    Extremities:    (  ) peripheral edema  Vascular: 2+ peripheral pulses  Neurological:    (    )A/O x 3,   (  ) focal deficits  Musculoskeletal:    (   )  normal strength b/l upper  (     ) normal  lower extremities  Skin: No rashes    MEDICATIONS:  MEDICATIONS  (STANDING):      LABS: All Labs Reviewed:                Blood Culture:   Urine Culture      RADIOLOGY/EKG:    ASSESSMENT AND PLAN:    DVT PPX:    ADVANCED DIRECTIVE:    DISPOSITION: CHIEF COMPLAINT:  Pt is 56M COPD and ALEJANDRO (CPAP) GERD schizophrenia hx opioid dependence (no MAT) HTN obesity BPH sent from [facility] with altered mental status.  Patient is poor hsitorian, says he was out of it last night, feels better and wants to go home.  Does not engage with medical interview.  Reportedly received xanax yesterday for anxiety, has recently been prescribed percocet  tid prn and did not use CPAP last night.      SUBJECTIVE:     REVIEW OF SYSTEMS: patient awake and verbal his condition improved significantly from 6 AM when I was called by nursing team from  [facility]    CONSTITUTIONAL: (  )  weakness,  (  ) fevers or chills  EYES/ENT: (  )visual changes;     NECK: (  ) pain or stiffness  RESPIRATORY:   ( x )cough, wheezing, hemoptysis;  (  ) shortness of breath  CARDIOVASCULAR:  (  )chest pain or palpitations  GASTROINTESTINAL:   (  )abdominal or epigastric pain.  (  ) nausea, vomiting, or hematemesis;   (   ) diarrhea or constipation.   GENITOURINARY:   (    ) dysuria, frequency or hematuria  NEUROLOGICAL:  (  x ) numbness or weakness   All other review of systems is negative unless indicated above    Vital Signs Last 24 Hrs  T(C): 36.6 (12 May 2022 09:06), Max: 36.6 (12 May 2022 08:03)  T(F): 97.8 (12 May 2022 09:06), Max: 97.8 (12 May 2022 08:03)  HR: 76 (12 May 2022 09:06) (66 - 77)  BP: 119/73 (12 May 2022 09:06) (119/73 - 125/73)  BP(mean): --  RR: 18 (12 May 2022 09:06) (16 - 18)  SpO2: 98% (12 May 2022 09:06) (98% - 99%)    I&O's Summary      CAPILLARY BLOOD GLUCOSE          PHYSICAL EXAM:    Constitutional:  (  x ) NAD,   (  x )awake and alert  HEENT: PERR, EOMI,    Neck: Soft and supple, No LAD, No JVD  Respiratory:  (  decreased  Breath sounds    Cardiovascular:     (  x )S1 and S2, regular rate and rhythm, no Murmurs, gallops or rubs  Gastrointestinal:  (  x )Bowel Sounds present, soft,   (  )nontender, nondistended,    Extremities:    ( x ) peripheral edema  Vascular: 2+ peripheral pulses  Neurological:    (   x )A/O x 3,   (  ) focal deficits  Musculoskeletal:    (   )  normal strength b/l upper  (     ) normal  lower extremities  Skin: No rashes    MEDICATIONS:  MEDICATIONS  (STANDING):      LABS: All Labs Reviewed:    not done at present time            Blood Culture:   Urine Culture      RADIOLOGY/EKG:   pending  ASSESSMENT AND PLAN:  56-year-old male with history of COPD/sleep apnea schizophrenia leg edema discussed with the ER and patient will make decision based on his chest x-ray regarding admission versus  Lasix IV or oral as outpatient  DVT PPX:    ADVANCED DIRECTIVE:    DISPOSITION:

## 2022-05-12 NOTE — H&P ADULT - HISTORY OF PRESENT ILLNESS
Pt is 56M COPD and ALEJANDRO (CPAP) GERD schizophrenia hx opioid dependence (no MAT) HTN obesity BPH sent from [facility] with altered mental status.  Patient is poor hsitorian, says he was out of it last night, feels better and wants to go home.  Does not engage with medical interview.  Reportedly received xanax yesterday for anxiety, has recently been prescribed percocet  tid prn and did not use CPAP last night.

## 2022-05-12 NOTE — ED ADULT NURSE NOTE - CHIEF COMPLAINT QUOTE
Pt arrives to ED from [facility] with report of AMS found at 6AM with unknown last known well.  Hx of DM and schizophrenia. Pt uses Bipap at night.  Pt snoring in triage but woke up when questioned.  Pt at first wasn't sure what month it was but then said May/2022 and is more aware as he is awake.  Pt reports he is at [facility] for back rehab.  Pt doesn't know why he is here but recognizes he is in a hospital. fs = 122 in triage.  Pt strength equal to all extremities.

## 2022-05-12 NOTE — ED PROVIDER NOTE - NS ED ROS FT
Constitutional: (-) fever (-) vomiting  Eyes/ENT: (-) vision changes, (-) hearing changes  Cardiovascular: (-) chest pain, (-) wheezing  Respiratory: (-) cough, (-) shortness of breath  Gastrointestinal: (-) vomiting, (-) diarrhea, (-) abdominal pain  : (-) dysuria   Musculoskeletal: (-) back pain  Integumentary: (-) rash, (-) edema  Neurological: (-)loc +somnolence  Allergic/Immunologic: (-) pruritus  Endocrine: No history of thyroid disease

## 2022-05-12 NOTE — ED PROVIDER NOTE - OBJECTIVE STATEMENT
57 y/o M hx schizophrenia, COPD (bipap at night), opioid dependence, here from [facility] for AMS. Was here 2 months ago for similar presentation - lethargy but answering questions appropriately, somnolent. Last time received narcan and was discharged. Per leon today was tired, difficult to wake, falling asleep easily. Did get xanax yesterday for anxiety. Did not use bipap last night. Pt currently without complaints, states this morning he was out of it at NH. Denies SI/HI/AV hallucinations. No cp, sob. No reported fevers.

## 2022-05-12 NOTE — ED ADULT NURSE REASSESSMENT NOTE - NS ED NURSE REASSESS COMMENT FT1
Pt with elopement from ED escorted back accompanied by security. ACP team and MD Altamirano made aware. Pt placed on 1:1 with PCA at bedside. Belongings across rm 21. Valuables sent to security. Pt is NSR on cardiac monitor. Pt sating 100% on 2L NC. Will continue to monitor

## 2022-05-12 NOTE — H&P ADULT - PROBLEM SELECTOR PLAN 1
PCO2 54, mild hypercapnia, lower than prior admission  likely multifactorial including lack of CPAP use, sedating medications xanax percoet and baclofen  Monitor mental status off opioids and benzos  resume CPAP when sleeping

## 2022-05-13 LAB
AMPHET UR-MCNC: NEGATIVE — SIGNIFICANT CHANGE UP
ANION GAP SERPL CALC-SCNC: 8 MMOL/L — SIGNIFICANT CHANGE UP (ref 7–14)
BARBITURATES UR SCN-MCNC: NEGATIVE — SIGNIFICANT CHANGE UP
BENZODIAZ UR-MCNC: NEGATIVE — SIGNIFICANT CHANGE UP
BUN SERPL-MCNC: 16 MG/DL — SIGNIFICANT CHANGE UP (ref 7–23)
CALCIUM SERPL-MCNC: 11 MG/DL — HIGH (ref 8.4–10.5)
CHLORIDE SERPL-SCNC: 100 MMOL/L — SIGNIFICANT CHANGE UP (ref 98–107)
CO2 SERPL-SCNC: 30 MMOL/L — SIGNIFICANT CHANGE UP (ref 22–31)
COCAINE METAB.OTHER UR-MCNC: NEGATIVE — SIGNIFICANT CHANGE UP
CREAT SERPL-MCNC: 0.93 MG/DL — SIGNIFICANT CHANGE UP (ref 0.5–1.3)
CREATININE URINE RESULT, DAU: 154 MG/DL — SIGNIFICANT CHANGE UP
EGFR: 95 ML/MIN/1.73M2 — SIGNIFICANT CHANGE UP
GLUCOSE SERPL-MCNC: 104 MG/DL — HIGH (ref 70–99)
HCT VFR BLD CALC: 46.5 % — SIGNIFICANT CHANGE UP (ref 39–50)
HGB BLD-MCNC: 14.1 G/DL — SIGNIFICANT CHANGE UP (ref 13–17)
MAGNESIUM SERPL-MCNC: 2 MG/DL — SIGNIFICANT CHANGE UP (ref 1.6–2.6)
MCHC RBC-ENTMCNC: 29.3 PG — SIGNIFICANT CHANGE UP (ref 27–34)
MCHC RBC-ENTMCNC: 30.3 GM/DL — LOW (ref 32–36)
MCV RBC AUTO: 96.5 FL — SIGNIFICANT CHANGE UP (ref 80–100)
METHADONE UR-MCNC: NEGATIVE — SIGNIFICANT CHANGE UP
NRBC # BLD: 0 /100 WBCS — SIGNIFICANT CHANGE UP
NRBC # FLD: 0 K/UL — SIGNIFICANT CHANGE UP
OPIATES UR-MCNC: NEGATIVE — SIGNIFICANT CHANGE UP
OXYCODONE UR-MCNC: NEGATIVE — SIGNIFICANT CHANGE UP
PCP SPEC-MCNC: SIGNIFICANT CHANGE UP
PCP UR-MCNC: NEGATIVE — SIGNIFICANT CHANGE UP
PHOSPHATE SERPL-MCNC: 2.6 MG/DL — SIGNIFICANT CHANGE UP (ref 2.5–4.5)
PLATELET # BLD AUTO: 186 K/UL — SIGNIFICANT CHANGE UP (ref 150–400)
POTASSIUM SERPL-MCNC: 4.1 MMOL/L — SIGNIFICANT CHANGE UP (ref 3.5–5.3)
POTASSIUM SERPL-SCNC: 4.1 MMOL/L — SIGNIFICANT CHANGE UP (ref 3.5–5.3)
RBC # BLD: 4.82 M/UL — SIGNIFICANT CHANGE UP (ref 4.2–5.8)
RBC # FLD: 14.5 % — SIGNIFICANT CHANGE UP (ref 10.3–14.5)
SODIUM SERPL-SCNC: 138 MMOL/L — SIGNIFICANT CHANGE UP (ref 135–145)
THC UR QL: POSITIVE
WBC # BLD: 7.86 K/UL — SIGNIFICANT CHANGE UP (ref 3.8–10.5)
WBC # FLD AUTO: 7.86 K/UL — SIGNIFICANT CHANGE UP (ref 3.8–10.5)

## 2022-05-13 RX ADMIN — Medication 5 MILLIGRAM(S): at 21:54

## 2022-05-13 RX ADMIN — DIVALPROEX SODIUM 1000 MILLIGRAM(S): 500 TABLET, DELAYED RELEASE ORAL at 13:05

## 2022-05-13 RX ADMIN — PANTOPRAZOLE SODIUM 40 MILLIGRAM(S): 20 TABLET, DELAYED RELEASE ORAL at 05:01

## 2022-05-13 RX ADMIN — TAMSULOSIN HYDROCHLORIDE 0.4 MILLIGRAM(S): 0.4 CAPSULE ORAL at 21:53

## 2022-05-13 RX ADMIN — Medication 650 MILLIGRAM(S): at 09:54

## 2022-05-13 RX ADMIN — Medication 5 MILLIGRAM(S): at 12:38

## 2022-05-13 RX ADMIN — Medication 75 MILLIGRAM(S): at 12:38

## 2022-05-13 RX ADMIN — Medication 40 MILLIGRAM(S): at 05:00

## 2022-05-13 RX ADMIN — Medication 650 MILLIGRAM(S): at 08:54

## 2022-05-13 RX ADMIN — Medication 1 MILLIGRAM(S): at 22:10

## 2022-05-13 RX ADMIN — SENNA PLUS 2 TABLET(S): 8.6 TABLET ORAL at 21:53

## 2022-05-13 RX ADMIN — Medication 5 MILLIGRAM(S): at 06:39

## 2022-05-13 RX ADMIN — AMLODIPINE BESYLATE 10 MILLIGRAM(S): 2.5 TABLET ORAL at 05:00

## 2022-05-14 ENCOUNTER — TRANSCRIPTION ENCOUNTER (OUTPATIENT)
Age: 57
End: 2022-05-14

## 2022-05-14 LAB
ANION GAP SERPL CALC-SCNC: 11 MMOL/L — SIGNIFICANT CHANGE UP (ref 7–14)
BUN SERPL-MCNC: 16 MG/DL — SIGNIFICANT CHANGE UP (ref 7–23)
CALCIUM SERPL-MCNC: 10.5 MG/DL — SIGNIFICANT CHANGE UP (ref 8.4–10.5)
CHLORIDE SERPL-SCNC: 97 MMOL/L — LOW (ref 98–107)
CO2 SERPL-SCNC: 26 MMOL/L — SIGNIFICANT CHANGE UP (ref 22–31)
CREAT SERPL-MCNC: 0.84 MG/DL — SIGNIFICANT CHANGE UP (ref 0.5–1.3)
EGFR: 101 ML/MIN/1.73M2 — SIGNIFICANT CHANGE UP
GLUCOSE SERPL-MCNC: 98 MG/DL — SIGNIFICANT CHANGE UP (ref 70–99)
HCT VFR BLD CALC: 41 % — SIGNIFICANT CHANGE UP (ref 39–50)
HGB BLD-MCNC: 13.3 G/DL — SIGNIFICANT CHANGE UP (ref 13–17)
MAGNESIUM SERPL-MCNC: 2 MG/DL — SIGNIFICANT CHANGE UP (ref 1.6–2.6)
MCHC RBC-ENTMCNC: 29.6 PG — SIGNIFICANT CHANGE UP (ref 27–34)
MCHC RBC-ENTMCNC: 32.4 GM/DL — SIGNIFICANT CHANGE UP (ref 32–36)
MCV RBC AUTO: 91.1 FL — SIGNIFICANT CHANGE UP (ref 80–100)
NRBC # BLD: 0 /100 WBCS — SIGNIFICANT CHANGE UP
NRBC # FLD: 0 K/UL — SIGNIFICANT CHANGE UP
PHOSPHATE SERPL-MCNC: 3 MG/DL — SIGNIFICANT CHANGE UP (ref 2.5–4.5)
PLATELET # BLD AUTO: 184 K/UL — SIGNIFICANT CHANGE UP (ref 150–400)
POTASSIUM SERPL-MCNC: 3.8 MMOL/L — SIGNIFICANT CHANGE UP (ref 3.5–5.3)
POTASSIUM SERPL-SCNC: 3.8 MMOL/L — SIGNIFICANT CHANGE UP (ref 3.5–5.3)
RBC # BLD: 4.5 M/UL — SIGNIFICANT CHANGE UP (ref 4.2–5.8)
RBC # FLD: 14.1 % — SIGNIFICANT CHANGE UP (ref 10.3–14.5)
SODIUM SERPL-SCNC: 134 MMOL/L — LOW (ref 135–145)
WBC # BLD: 7.84 K/UL — SIGNIFICANT CHANGE UP (ref 3.8–10.5)
WBC # FLD AUTO: 7.84 K/UL — SIGNIFICANT CHANGE UP (ref 3.8–10.5)

## 2022-05-14 RX ADMIN — DIVALPROEX SODIUM 1000 MILLIGRAM(S): 500 TABLET, DELAYED RELEASE ORAL at 13:40

## 2022-05-14 RX ADMIN — PANTOPRAZOLE SODIUM 40 MILLIGRAM(S): 20 TABLET, DELAYED RELEASE ORAL at 06:38

## 2022-05-14 RX ADMIN — TAMSULOSIN HYDROCHLORIDE 0.4 MILLIGRAM(S): 0.4 CAPSULE ORAL at 21:27

## 2022-05-14 RX ADMIN — Medication 40 MILLIGRAM(S): at 05:13

## 2022-05-14 RX ADMIN — Medication 1 MILLIGRAM(S): at 06:41

## 2022-05-14 RX ADMIN — Medication 5 MILLIGRAM(S): at 13:39

## 2022-05-14 RX ADMIN — Medication 5 MILLIGRAM(S): at 21:27

## 2022-05-14 RX ADMIN — SENNA PLUS 2 TABLET(S): 8.6 TABLET ORAL at 21:28

## 2022-05-14 RX ADMIN — Medication 5 MILLIGRAM(S): at 05:13

## 2022-05-14 RX ADMIN — ALBUTEROL 2 PUFF(S): 90 AEROSOL, METERED ORAL at 21:33

## 2022-05-14 RX ADMIN — AMLODIPINE BESYLATE 10 MILLIGRAM(S): 2.5 TABLET ORAL at 05:13

## 2022-05-14 RX ADMIN — Medication 75 MILLIGRAM(S): at 13:41

## 2022-05-14 NOTE — DISCHARGE NOTE PROVIDER - NSDCCPCAREPLAN_GEN_ALL_CORE_FT
PRINCIPAL DISCHARGE DIAGNOSIS  Diagnosis: Confusion  Assessment and Plan of Treatment: Resolved. Your urine toxicology was positive for oxycodone and THC (cannabis). Please abstain from oxycodone.   Follow up with Dr. Chavarria.

## 2022-05-14 NOTE — DISCHARGE NOTE PROVIDER - NSDCMRMEDTOKEN_GEN_ALL_CORE_FT
acetaminophen 325 mg oral tablet: 2 tab(s) orally every 6 hours, As needed, Temp greater or equal to 38C (100.4F), Mild Pain (1 - 3), Moderate Pain (4 - 6)  albuterol 90 mcg/inh inhalation aerosol: 2 puff(s) inhaled every 4 hours, As needed, Shortness of Breath and/or Wheezing  amLODIPine 10 mg oral tablet: 1 tab(s) orally once a day  baclofen 5 mg oral tablet: 1 tab(s) orally 3 times a day  benzonatate 100 mg oral capsule: 1 cap(s) orally 3 times a day, As needed, Cough  divalproex sodium 500 mg oral tablet, extended release: 2 tab(s) orally once a day  furosemide 40 mg oral tablet: 1 tab(s) orally once a day  guaiFENesin 100 mg/5 mL oral liquid: 5 milliliter(s) orally every 6 hours, As needed, Cough  Invega Sustenna 234 mg/1.5 mL intramuscular suspension, extended release: 234 milligram(s) intramuscular every 4 weeks  pantoprazole 40 mg oral delayed release tablet: 1 tab(s) orally once a day (before a meal)  Percocet 5/325 oral tablet: 1 tab(s) orally 3 times a day, As Needed  senna oral tablet: 2 tab(s) orally once a day (at bedtime)  simethicone 80 mg oral tablet, chewable: 1 tab(s) orally 4 times a day, As needed, Heartburn  tamsulosin 0.4 mg oral capsule: 1 cap(s) orally once a day (at bedtime)  venlafaxine 75 mg oral capsule, extended release: 1 cap(s) orally once a day   acetaminophen 325 mg oral tablet: 2 tab(s) orally every 6 hours, As needed, Temp greater or equal to 38C (100.4F), Mild Pain (1 - 3), Moderate Pain (4 - 6), Severe Pain (7 - 10)  albuterol 90 mcg/inh inhalation aerosol: 2 puff(s) inhaled every 4 hours, As needed, Shortness of Breath and/or Wheezing  amLODIPine 10 mg oral tablet: 1 tab(s) orally once a day  baclofen 5 mg oral tablet: 1 tab(s) orally 3 times a day  benzonatate 100 mg oral capsule: 1 cap(s) orally 3 times a day, As needed, Cough  divalproex sodium 500 mg oral tablet, extended release: 2 tab(s) orally once a day  furosemide 40 mg oral tablet: 1 tab(s) orally once a day  guaiFENesin 100 mg/5 mL oral liquid: 5 milliliter(s) orally every 6 hours, As needed, Cough  Invega Sustenna 234 mg/1.5 mL intramuscular suspension, extended release: 234 milligram(s) intramuscular every 4 weeks  pantoprazole 40 mg oral delayed release tablet: 1 tab(s) orally once a day (before a meal)  senna oral tablet: 2 tab(s) orally once a day (at bedtime)  simethicone 80 mg oral tablet, chewable: 1 tab(s) orally 4 times a day, As needed, Heartburn  tamsulosin 0.4 mg oral capsule: 1 cap(s) orally once a day (at bedtime)  venlafaxine 75 mg oral capsule, extended release: 1 cap(s) orally once a day

## 2022-05-14 NOTE — DISCHARGE NOTE PROVIDER - HOSPITAL COURSE
56M COPD (CPAP) schizophrenia hx opioid dependence (no MAT) HTN obesity BPH sent from [facility] with altered mental status.    Altered mental status. condition  reolved  -PCO2 54, mild hypercapnia,   resume CPAP when sleeping.    Asthma with COPD.   - albuterol prn.    ALEJANDRO on CPAP.   -CPAP as above  if no improvement consider pulm eval, follows with pulm as outpt.  -5/13/2022 condition is stable no need for pulmonary consult    Diastolic CHF, chronic.   -continue lasix, lungs clear, check BNP  normal LVEF on TTE 11/21.     Schizoaffective schizophrenia.   -invega sustenna q4wks, confirm when next dose due with [facility]  continue depakote, venlafaxine.    Prophylactic measure.   -VTE risk increased with obesity, lethargy, will use hep sq VTE ppx.    On ___ this case was reviewed with  ____, the patient is medically stable and optimized for discharge. All medications were reviewed and prescriptions were sent to mutually agreed upon pharmacy.   56M COPD (CPAP) schizophrenia hx opioid dependence (no MAT) HTN obesity BPH sent from [facility] with altered mental status.    Altered mental status.   with lethargy likely i/s/o opioid use. Urine with opioids and THC, condition  resolved  -PCO2 54, mild hypercapnia,   resume CPAP when sleeping.    Asthma with COPD.   - albuterol prn.    ALEJANDRO on CPAP.   -CPAP as above  -5/13/2022 condition is stable no need for pulmonary consult, follows with pulmonary outpatient     Diastolic CHF, chronic.   -continue lasix, lungs clear, check BNP  normal LVEF on TTE 11/21.     Schizoaffective schizophrenia.   -invega sustenna q4wks, confirm when next dose due with [facility]  continue depakote, venlafaxine.    Prophylactic measure.   -VTE risk increased with obesity, lethargy, will use hep sq VTE ppx.    On 5/16 pt is stable for discharge back to nursing home. 56M COPD (CPAP) schizophrenia hx opioid dependence (no MAT) HTN obesity BPH sent from [facility] with altered mental status.    Altered mental status.   with lethargy likely i/s/o opioid use. Urine with opioids and THC, condition  resolved  -PCO2 54, mild hypercapnia,   resume CPAP when sleeping.    Asthma with COPD.   - albuterol prn.    ALEJANDRO on CPAP.   -CPAP as above  -5/13/2022 condition is stable no need for pulmonary consult, follows with pulmonary outpatient     Diastolic CHF, chronic.   -continue lasix, lungs clear, check BNP  normal LVEF on TTE 11/21.     Schizoaffective schizophrenia.   -invega sustenna q4wks, confirm when next dose due with [facility]  continue depakote, venlafaxine.    Prophylactic measure.   -VTE risk increased with obesity, lethargy, will use hep sq VTE ppx.    On 5/16 pt is stable for discharge back to nursing home as d/w Dr. Chavarria.

## 2022-05-14 NOTE — DISCHARGE NOTE PROVIDER - CARE PROVIDER_API CALL
Poly Easley)  Medicine  [address] [place] [address]  Phone: [phone]  Fax: [phone]  Follow Up Time:

## 2022-05-15 LAB
ANION GAP SERPL CALC-SCNC: 9 MMOL/L — SIGNIFICANT CHANGE UP (ref 7–14)
BUN SERPL-MCNC: 20 MG/DL — SIGNIFICANT CHANGE UP (ref 7–23)
CALCIUM SERPL-MCNC: 10.2 MG/DL — SIGNIFICANT CHANGE UP (ref 8.4–10.5)
CHLORIDE SERPL-SCNC: 102 MMOL/L — SIGNIFICANT CHANGE UP (ref 98–107)
CO2 SERPL-SCNC: 29 MMOL/L — SIGNIFICANT CHANGE UP (ref 22–31)
CREAT SERPL-MCNC: 0.95 MG/DL — SIGNIFICANT CHANGE UP (ref 0.5–1.3)
EGFR: 93 ML/MIN/1.73M2 — SIGNIFICANT CHANGE UP
GLUCOSE SERPL-MCNC: 117 MG/DL — HIGH (ref 70–99)
HCT VFR BLD CALC: 37.2 % — LOW (ref 39–50)
HGB BLD-MCNC: 12.2 G/DL — LOW (ref 13–17)
MAGNESIUM SERPL-MCNC: 1.8 MG/DL — SIGNIFICANT CHANGE UP (ref 1.6–2.6)
MCHC RBC-ENTMCNC: 29.3 PG — SIGNIFICANT CHANGE UP (ref 27–34)
MCHC RBC-ENTMCNC: 32.8 GM/DL — SIGNIFICANT CHANGE UP (ref 32–36)
MCV RBC AUTO: 89.4 FL — SIGNIFICANT CHANGE UP (ref 80–100)
NRBC # BLD: 0 /100 WBCS — SIGNIFICANT CHANGE UP
NRBC # FLD: 0 K/UL — SIGNIFICANT CHANGE UP
PHOSPHATE SERPL-MCNC: 3.8 MG/DL — SIGNIFICANT CHANGE UP (ref 2.5–4.5)
PLATELET # BLD AUTO: 177 K/UL — SIGNIFICANT CHANGE UP (ref 150–400)
POTASSIUM SERPL-MCNC: 4 MMOL/L — SIGNIFICANT CHANGE UP (ref 3.5–5.3)
POTASSIUM SERPL-SCNC: 4 MMOL/L — SIGNIFICANT CHANGE UP (ref 3.5–5.3)
RBC # BLD: 4.16 M/UL — LOW (ref 4.2–5.8)
RBC # FLD: 14.3 % — SIGNIFICANT CHANGE UP (ref 10.3–14.5)
SODIUM SERPL-SCNC: 140 MMOL/L — SIGNIFICANT CHANGE UP (ref 135–145)
WBC # BLD: 7.29 K/UL — SIGNIFICANT CHANGE UP (ref 3.8–10.5)
WBC # FLD AUTO: 7.29 K/UL — SIGNIFICANT CHANGE UP (ref 3.8–10.5)

## 2022-05-15 RX ADMIN — Medication 40 MILLIGRAM(S): at 05:05

## 2022-05-15 RX ADMIN — SENNA PLUS 2 TABLET(S): 8.6 TABLET ORAL at 21:48

## 2022-05-15 RX ADMIN — Medication 5 MILLIGRAM(S): at 05:05

## 2022-05-15 RX ADMIN — Medication 5 MILLIGRAM(S): at 13:57

## 2022-05-15 RX ADMIN — Medication 0.5 MILLIGRAM(S): at 02:30

## 2022-05-15 RX ADMIN — DIVALPROEX SODIUM 1000 MILLIGRAM(S): 500 TABLET, DELAYED RELEASE ORAL at 13:57

## 2022-05-15 RX ADMIN — Medication 1 MILLIGRAM(S): at 22:43

## 2022-05-15 RX ADMIN — Medication 0.5 MILLIGRAM(S): at 04:58

## 2022-05-15 RX ADMIN — Medication 75 MILLIGRAM(S): at 13:57

## 2022-05-15 RX ADMIN — TAMSULOSIN HYDROCHLORIDE 0.4 MILLIGRAM(S): 0.4 CAPSULE ORAL at 21:47

## 2022-05-15 RX ADMIN — ALBUTEROL 2 PUFF(S): 90 AEROSOL, METERED ORAL at 16:20

## 2022-05-15 RX ADMIN — Medication 5 MILLIGRAM(S): at 21:47

## 2022-05-15 RX ADMIN — AMLODIPINE BESYLATE 10 MILLIGRAM(S): 2.5 TABLET ORAL at 05:05

## 2022-05-15 RX ADMIN — PANTOPRAZOLE SODIUM 40 MILLIGRAM(S): 20 TABLET, DELAYED RELEASE ORAL at 05:05

## 2022-05-15 NOTE — PROGRESS NOTE ADULT - SUBJECTIVE AND OBJECTIVE BOX
CHIEF COMPLAINT: patient condition stable no event overnight except he was trying to leave and 5/12/2022 and had a long discussion with him regarding discharge planning with medical team at x-ray only showed atelectasis in the right lower lobe    SUBJECTIVE:     REVIEW OF SYSTEMS: still with mild cough    CONSTITUTIONAL: (  )  weakness,  (  ) fevers or chills  EYES/ENT: (  )visual changes;     NECK: (  ) pain or stiffness  RESPIRATORY:   (  )cough, wheezing, hemoptysis;  (  ) shortness of breath  CARDIOVASCULAR:  (  )chest pain or palpitations  GASTROINTESTINAL:   (  )abdominal or epigastric pain.  (  ) nausea, vomiting, or hematemesis;   (   ) diarrhea or constipation.   GENITOURINARY:   (    ) dysuria, frequency or hematuria  NEUROLOGICAL:  (   ) numbness or weakness   All other review of systems is negative unless indicated above    Vital Signs Last 24 Hrs  T(C): 36.9 (13 May 2022 13:17), Max: 36.9 (12 May 2022 22:42)  T(F): 98.5 (13 May 2022 13:17), Max: 98.5 (13 May 2022 13:17)  HR: 85 (13 May 2022 13:17) (85 - 100)  BP: 153/80 (13 May 2022 13:17) (132/95 - 153/80)  BP(mean): --  RR: 17 (13 May 2022 13:17) (17 - 19)  SpO2: 98% (13 May 2022 13:17) (98% - 100%)    I&O's Summary      CAPILLARY BLOOD GLUCOSE          PHYSICAL EXAM:    Constitutional:  (  x ) NAD,   ( x  )awake and alert  HEENT: PERR, EOMI,    Neck: Soft and supple, No LAD, No JVD  Respiratory:  (  x  Breath sounds are clear bilaterally,    ( mild rhonchi at the right lower base no wheezing )   Cardiovascular:     ( x  )S1 and S2, regular rate and rhythm, no Murmurs, gallops or rubs  Gastrointestinal:  ( x  )Bowel Sounds present, soft,   (  )nontender, nondistended,    Extremities:    (  ) peripheral edema  Vascular: 2+ peripheral pulses  Neurological:    (  x  )A/O x 3,     Musculoskeletal:    (   )  normal strength b/l upper  (     ) normal  lower extremities  Skin: old scar of right lower extremity lesion    MEDICATIONS:  MEDICATIONS  (STANDING):  amLODIPine   Tablet 10 milliGRAM(s) Oral daily  baclofen 5 milliGRAM(s) Oral every 8 hours  diVALproex ER 1000 milliGRAM(s) Oral daily  furosemide    Tablet 40 milliGRAM(s) Oral daily  pantoprazole    Tablet 40 milliGRAM(s) Oral before breakfast  senna 2 Tablet(s) Oral at bedtime  tamsulosin 0.4 milliGRAM(s) Oral at bedtime  venlafaxine XR. 75 milliGRAM(s) Oral daily      LABS: All Labs Reviewed:                        14.1   7.86  )-----------( 186      ( 13 May 2022 06:42 )             46.5     05-13    138  |  100  |  16  ----------------------------<  104<H>  4.1   |  30  |  0.93    Ca    11.0<H>      13 May 2022 06:42  Phos  2.6     05-13  Mg     2.00     05-13    TPro  7.3  /  Alb  4.1  /  TBili  0.3  /  DBili  x   /  AST  31  /  ALT  37  /  AlkPhos  100  05-12          Blood Culture:   Urine Culture      RADIOLOGY/EKG:    ASSESSMENT AND PLAN:  56M COPD (CPAP) schizophrenia hx opioid dependence (no MAT) HTN obesity BPH sent from [facility] with altered mental status.    Problem/Plan - 1:  ·  Problem: Altered mental status. condition  reolved  ·  Plan: PCO2 54, mild hypercapnia,   resume CPAP when sleeping.    Problem/Plan - 2:  ·  Problem: Asthma with COPD.   ·  Plan: albuterol prn.    Problem/Plan - 3:  ·  Problem: ALEJANDRO on CPAP.   ·  Plan: CPAP as above  if no improvement consider pulm eval, follows with pulm as outpt.  -5/13/2022 condition is stable no need for pulmonary consult    Problem/Plan - 4:  ·  Problem: Diastolic CHF, chronic.   ·  Plan: continue lasix, lungs clear, check BNP  normal LVEF on TTE 11/21.    Problem/Plan - 5:  ·  Problem: Schizoaffective schizophrenia.   ·  Plan: invega sustenna q4wks, confirm when next dose due with [facility]  continue depakote, venlafaxine.    Problem/Plan - 6:  ·  Problem: Prophylactic measure.   ·  Plan: VTE risk increased with obesity, lethargy, will use hep sq VTE ppx.        DVT PPX:    ADVANCED DIRECTIVE:    DISPOSITION: discharge planning discussed with patient and
CHIEF COMPLAINT:  Patient was seen earlier today awake verbal ambulating in his room without complaint  SUBJECTIVE:     REVIEW OF SYSTEMS:No cough no Sob    CONSTITUTIONAL: (  )  weakness,  (  ) fevers or chills  EYES/ENT: (  )visual changes;     NECK: (  ) pain or stiffness  RESPIRATORY:   (  )cough, wheezing, hemoptysis;  (  ) shortness of breath  CARDIOVASCULAR:  (  )chest pain or palpitations  GASTROINTESTINAL:   (  )abdominal or epigastric pain.  (  ) nausea, vomiting, or hematemesis;   (   ) diarrhea or constipation.   GENITOURINARY:   (    ) dysuria, frequency or hematuria  NEUROLOGICAL:  (   ) numbness or weakness   All other review of systems is negative unless indicated above    Vital Signs Last 24 Hrs  T(C): 36.7 (15 May 2022 17:49), Max: 36.8 (15 May 2022 02:35)  T(F): 98.1 (15 May 2022 17:49), Max: 98.2 (15 May 2022 02:35)  HR: 82 (15 May 2022 17:49) (69 - 111)  BP: 125/68 (15 May 2022 17:49) (125/68 - 144/82)  BP(mean): --  RR: 18 (15 May 2022 17:49) (18 - 20)  SpO2: 97% (15 May 2022 17:49) (96% - 100%)    I&O's Summary    14 May 2022 07:01  -  15 May 2022 07:00  --------------------------------------------------------  IN: 720 mL / OUT: 0 mL / NET: 720 mL        CAPILLARY BLOOD GLUCOSE          PHYSICAL EXAM:    Constitutional:  ( x  ) NAD,   (   )awake and alert  HEENT: PERR, EOMI,    Neck: Soft and supple, No LAD, No JVD  Respiratory:  (   x Breath sounds are clear Decreased at the right bases  Cardiovascular:     ( x  )S1 and S2, regular rate and rhythm, no Murmurs, gallops or rubs  Gastrointestinal:  (  x )Bowel Sounds present, soft,   (  )nontender, nondistended,    Extremities:    (  ) peripheral edema  Vascular: 2+ peripheral pulses  Neurological:    (  x  )A/O x 3,   (  ) focal deficits  Musculoskeletal:    (   )  normal strength b/l upper  (     ) normal  lower extremities  Skin: No rashes    MEDICATIONS:  MEDICATIONS  (STANDING):  amLODIPine   Tablet 10 milliGRAM(s) Oral daily  baclofen 5 milliGRAM(s) Oral every 8 hours  diVALproex ER 1000 milliGRAM(s) Oral daily  furosemide    Tablet 40 milliGRAM(s) Oral daily  pantoprazole    Tablet 40 milliGRAM(s) Oral before breakfast  senna 2 Tablet(s) Oral at bedtime  tamsulosin 0.4 milliGRAM(s) Oral at bedtime  venlafaxine XR. 75 milliGRAM(s) Oral daily      LABS: All Labs Reviewed:                        12.2   7.29  )-----------( 177      ( 15 May 2022 06:50 )             37.2     05-15    140  |  102  |  20  ----------------------------<  117<H>  4.0   |  29  |  0.95    Ca    10.2      15 May 2022 06:50  Phos  3.8     05-15  Mg     1.80     05-15            Blood Culture:   Urine Culture      RADIOLOGY/EKG:    ASSESSMENT AND PLAN:  Patient condition is stable discussed with medical team and patient In detail for discharge planning a.m.  DVT PPX:    ADVANCED DIRECTIVE:    DISPOSITION:
CHIEF COMPLAINT: Patient condition remain stable no event overnight discussed with him in detail regarding his atelectasis is concerned I emphasized this is nothing serious she need to go back to rehab open today and he can start pulmonary rehab at the facility also bedside exercise was discussed with him and demonstrated for him    SUBJECTIVE:     REVIEW OF SYSTEMS:mild cough only    CONSTITUTIONAL: (  )  weakness,  (  ) fevers or chills  EYES/ENT: (  )visual changes;     NECK: (  ) pain or stiffness  RESPIRATORY:   (  )cough, wheezing, hemoptysis;  (  ) shortness of breath  CARDIOVASCULAR:  (  )chest pain or palpitations  GASTROINTESTINAL:   (  )abdominal or epigastric pain.  (  ) nausea, vomiting, or hematemesis;   (   ) diarrhea or constipation.   GENITOURINARY:   (    ) dysuria, frequency or hematuria  NEUROLOGICAL:  (   ) numbness or weakness   All other review of systems is negative unless indicated above    Vital Signs Last 24 Hrs  T(C): 36.4 (14 May 2022 10:30), Max: 37 (13 May 2022 21:17)  T(F): 97.6 (14 May 2022 10:30), Max: 98.6 (13 May 2022 21:17)  HR: 80 (14 May 2022 10:58) (77 - 99)  BP: 145/79 (14 May 2022 10:30) (130/73 - 145/79)  BP(mean): --  RR: 19 (14 May 2022 10:30) (17 - 19)  SpO2: 94% (14 May 2022 10:58) (93% - 94%)    I&O's Summary    13 May 2022 07:01  -  14 May 2022 07:00  --------------------------------------------------------  IN: 280 mL / OUT: 7 mL / NET: 273 mL    14 May 2022 07:01  -  14 May 2022 14:40  --------------------------------------------------------  IN: 240 mL / OUT: 0 mL / NET: 240 mL        CAPILLARY BLOOD GLUCOSE          PHYSICAL EXAM:    Constitutional:  ( x  ) NAD,   (  x )awake and alert  HEENT: PERR, EOMI,    Neck: Soft and supple, No LAD, No JVD  Respiratory:  (   x Breath sounds decreased at the right lower base  Cardiovascular:     ( x  )S1 and S2, regular rate and rhythm, no Murmurs, gallops or rubs  Gastrointestinal:  (  x )Bowel Sounds present, soft,   (  )nontender, nondistended,    Extremities:    (x  ) peripheral edema  Vascular: 2+ peripheral pulses  Neurological:    (  x  )A/O x 3,   (  ) focal deficits  Musculoskeletal:    (  x )  normal strength b/l upper  (  x   ) normal  lower extremities  Skin: No rashes    MEDICATIONS:  MEDICATIONS  (STANDING):  amLODIPine   Tablet 10 milliGRAM(s) Oral daily  baclofen 5 milliGRAM(s) Oral every 8 hours  diVALproex ER 1000 milliGRAM(s) Oral daily  furosemide    Tablet 40 milliGRAM(s) Oral daily  pantoprazole    Tablet 40 milliGRAM(s) Oral before breakfast  senna 2 Tablet(s) Oral at bedtime  tamsulosin 0.4 milliGRAM(s) Oral at bedtime  venlafaxine XR. 75 milliGRAM(s) Oral daily      LABS: All Labs Reviewed:                        13.3   7.84  )-----------( 184      ( 14 May 2022 05:30 )             41.0     05-14    134<L>  |  97<L>  |  16  ----------------------------<  98  3.8   |  26  |  0.84    Ca    10.5      14 May 2022 05:30  Phos  3.0     05-14  Mg     2.00     05-14            Blood Culture:   Urine Culture      RADIOLOGY/EKG:    ASSESSMENT AND PLAN:  patient condition stable for discharge  hypertension with continue amlodipine 10 mg for his hypertension.  atelectasis no need for antibiotic  history of CHF   Lasix for his leg edema and history of CHF  DVT PPX:    ADVANCED DIRECTIVE:    DISPOSITION:discharge planning discussed with the patient and the nurse on the unit and  at [phone] and also director of nursing at the facility (Andrerizwan thomason phone number [phone])

## 2022-05-16 ENCOUNTER — TRANSCRIPTION ENCOUNTER (OUTPATIENT)
Age: 57
End: 2022-05-16

## 2022-05-16 VITALS
HEART RATE: 73 BPM | RESPIRATION RATE: 17 BRPM | DIASTOLIC BLOOD PRESSURE: 99 MMHG | OXYGEN SATURATION: 98 % | SYSTOLIC BLOOD PRESSURE: 133 MMHG | TEMPERATURE: 98 F

## 2022-05-16 LAB
ANION GAP SERPL CALC-SCNC: 8 MMOL/L — SIGNIFICANT CHANGE UP (ref 7–14)
BUN SERPL-MCNC: 15 MG/DL — SIGNIFICANT CHANGE UP (ref 7–23)
CALCIUM SERPL-MCNC: 10.7 MG/DL — HIGH (ref 8.4–10.5)
CHLORIDE SERPL-SCNC: 100 MMOL/L — SIGNIFICANT CHANGE UP (ref 98–107)
CO2 SERPL-SCNC: 29 MMOL/L — SIGNIFICANT CHANGE UP (ref 22–31)
CREAT SERPL-MCNC: 0.92 MG/DL — SIGNIFICANT CHANGE UP (ref 0.5–1.3)
EGFR: 96 ML/MIN/1.73M2 — SIGNIFICANT CHANGE UP
GLUCOSE SERPL-MCNC: 89 MG/DL — SIGNIFICANT CHANGE UP (ref 70–99)
HCT VFR BLD CALC: 37.5 % — LOW (ref 39–50)
HGB BLD-MCNC: 12 G/DL — LOW (ref 13–17)
MAGNESIUM SERPL-MCNC: 1.9 MG/DL — SIGNIFICANT CHANGE UP (ref 1.6–2.6)
MCHC RBC-ENTMCNC: 29.3 PG — SIGNIFICANT CHANGE UP (ref 27–34)
MCHC RBC-ENTMCNC: 32 GM/DL — SIGNIFICANT CHANGE UP (ref 32–36)
MCV RBC AUTO: 91.7 FL — SIGNIFICANT CHANGE UP (ref 80–100)
NRBC # BLD: 0 /100 WBCS — SIGNIFICANT CHANGE UP
NRBC # FLD: 0 K/UL — SIGNIFICANT CHANGE UP
PHOSPHATE SERPL-MCNC: 3.2 MG/DL — SIGNIFICANT CHANGE UP (ref 2.5–4.5)
PLATELET # BLD AUTO: 159 K/UL — SIGNIFICANT CHANGE UP (ref 150–400)
POTASSIUM SERPL-MCNC: 3.8 MMOL/L — SIGNIFICANT CHANGE UP (ref 3.5–5.3)
POTASSIUM SERPL-SCNC: 3.8 MMOL/L — SIGNIFICANT CHANGE UP (ref 3.5–5.3)
RBC # BLD: 4.09 M/UL — LOW (ref 4.2–5.8)
RBC # FLD: 14.3 % — SIGNIFICANT CHANGE UP (ref 10.3–14.5)
SARS-COV-2 RNA SPEC QL NAA+PROBE: SIGNIFICANT CHANGE UP
SODIUM SERPL-SCNC: 137 MMOL/L — SIGNIFICANT CHANGE UP (ref 135–145)
WBC # BLD: 8.26 K/UL — SIGNIFICANT CHANGE UP (ref 3.8–10.5)
WBC # FLD AUTO: 8.26 K/UL — SIGNIFICANT CHANGE UP (ref 3.8–10.5)

## 2022-05-16 RX ORDER — ACETAMINOPHEN 500 MG
2 TABLET ORAL
Qty: 0 | Refills: 0 | DISCHARGE
Start: 2022-05-16

## 2022-05-16 RX ADMIN — Medication 1 MILLIGRAM(S): at 03:46

## 2022-05-16 RX ADMIN — ALBUTEROL 2 PUFF(S): 90 AEROSOL, METERED ORAL at 13:41

## 2022-05-16 RX ADMIN — Medication 0.5 MILLIGRAM(S): at 10:04

## 2022-05-16 RX ADMIN — PANTOPRAZOLE SODIUM 40 MILLIGRAM(S): 20 TABLET, DELAYED RELEASE ORAL at 07:11

## 2022-05-16 RX ADMIN — Medication 5 MILLIGRAM(S): at 13:41

## 2022-05-16 RX ADMIN — AMLODIPINE BESYLATE 10 MILLIGRAM(S): 2.5 TABLET ORAL at 07:11

## 2022-05-16 RX ADMIN — Medication 5 MILLIGRAM(S): at 07:11

## 2022-05-16 RX ADMIN — Medication 75 MILLIGRAM(S): at 12:27

## 2022-05-16 RX ADMIN — DIVALPROEX SODIUM 1000 MILLIGRAM(S): 500 TABLET, DELAYED RELEASE ORAL at 12:27

## 2022-05-16 RX ADMIN — Medication 40 MILLIGRAM(S): at 07:11

## 2022-05-16 NOTE — DISCHARGE NOTE NURSING/CASE MANAGEMENT/SOCIAL WORK - NSDCVIVACCINE_GEN_ALL_CORE_FT
Tdap; 22-Feb-2018 10:01; Thelma Mcqueen (RN); [employer]; [id number]; IntraMuscular; Deltoid Left.; 0.5 milliLiter(s); VIS (VIS Published: 09-May-2013, VIS Presented: 22-Feb-2018);   Tdap; 04-Jun-2020 16:22; Osman Reyes); [employer]; [id number] (Exp. Date: 05-Jul-2020); IntraMuscular; Deltoid Right.; 0.5 milliLiter(s); VIS (VIS Published: 09-May-2013, VIS Presented: 04-Jun-2020);

## 2022-05-16 NOTE — DISCHARGE NOTE NURSING/CASE MANAGEMENT/SOCIAL WORK - PATIENT PORTAL LINK FT
You can access the FollowMyHealth Patient Portal offered by [facility] by registering at the following website: [email or web address]. By joining [employer]’s FollowMyHealth portal, you will also be able to view your health information using other applications (apps) compatible with our system.

## 2022-05-16 NOTE — DISCHARGE NOTE NURSING/CASE MANAGEMENT/SOCIAL WORK - NSDCPEFALRISK_GEN_ALL_CORE
For information on Fall & Injury Prevention, visit: [email or web address] OR  [email or web address] OR  https://www.cdc.gov/steadi/patient.html

## 2022-05-16 NOTE — CHART NOTE - NSCHARTNOTEFT_GEN_A_CORE
patient was seen earlier this morning condition remained stable no events overnight over the case management regarding discharge plan in detail patient is stable for discharge back to  [facility]  vital signs  ICU Vital Signs Last 24 Hrs  T(C): 36.7 (16 May 2022 10:32), Max: 36.7 (16 May 2022 10:32)  T(F): 98 (16 May 2022 10:32), Max: 98 (16 May 2022 10:32)  HR: 72 (16 May 2022 10:32) (72 - 76)  BP: 143/79 (16 May 2022 10:32) (132/72 - 143/79)  BP(mean): --  ABP: --            ABP(mean): --  RR: 18 (16 May 2022 10:32) (18 - 18)  SpO2: 97% (16 May 2022 10:32) (96% - 97%)

## 2022-05-16 NOTE — ED ADULT TRIAGE NOTE - CHIEF COMPLAINT QUOTE
BIBEMS from home s/p drinking a "bottle of alcohol and taking 8 divalproex." Patient is only responsive to painful stimuli at present, breathing even and unlabored. Will wake up momentarily and then fall right back to sleep. CN aware, will room patient. EKG in progress. no medical PMH obtained aside from Bipolar disorder. no fever and no chills.

## 2022-06-12 NOTE — DISCHARGE NOTE PROVIDER - HOSPITAL COURSE
[facility] [address]IOR [address]  PRIOR [facility] [id number] [phone]       June 23, 2022    Obdulio Wallace  [address] [facility] [id number]    To Obdulio:     We have been calling you regarding a recent refill request we received for Atorvastatin, Lisinopril-Hydrochlorothiazide, and Sertraline  .  Unfortunately, we were unable to reach you.  We are notifying you that you are due for physical with fasting labs prior to your next refill.  You can schedule this appointment via [employer] or by calling the clinic at [phone] Option 1.      Sincerely,        Anthony Gutiérrez M.D./crc   57y/o male with history of Parkinsons, COPD, CHF, opiate dependence, depression, sleep apnea on CPAP presents to the ED with SOB x 3 days found to be RSV and COVID+ (vaccinated x3) admitted for acute hypoxic respiratory failure on AVAPS   He had a recent covid infection in october s/p monoclonal antibody at facility. His PCR is positive. Patient is on AVAPs at night and currently on room air without any distress. CXR is done showing no focal consolidation to suggest other infectious etiology however it shows B/L pleural effusions which could be the reason for hypercapnia.  Echocardiogram from 4/2019 showing low-normal EF; TTE from _____________   Most recent ABG 7.42/49/188 on AVAPS, based on most recent serum HCO3 of 30 patient may actually have a slight relative metabolic alkalosis. Patient has received dexamethasone 6 mg x10 days for COVID positive and remdemsivir x5 days. patient is currently on prednisone 40 mg PO for 5 days.     - check TTE  - can trial off NIV during the day, would place back on NIV at night given history of ALEJANDRO with the following settings: Rate: 16, Tidal Volume: 640 ml, EPAP: 8, Minimum inspiratory pressure: 10, maximum inspiratory pressure: 30, FiO2: 21%  - re-check VBG prior to re-starting nighttime AVAPS  - duonebs q 6 hours  - when not on NIV please provide and encourage use of incentive spirometry- - wean FIO2 to target SpO2 88-92%    : COPD, moderate.   : Pt adm with COPD; Baseline PaCO2:  On L NC at home   - C/w Duonebs , prednisone 40mg PO daily x 5 days  pulm following - AVApap Qhs   - CTM ABGs.    : CHF, chronic.   Patient w/ CHF HFpEF vs HFrEF w/ last known EF:   - Lasix   - Daily Dry Weight, Strict I and Os, BMP QD;p K>4, Mg>2.     Problem/Plan - 5:  : Prophylactic measure.   DVT PPx: Lovenox 40mg BID   Code: Full   Dispo: Pending Hospital Course  Communication: Brother Joseph Petersen: [phone].         56M Hx Parkinsons, COPD, CHF, opiate dependence, depression, sleep apnea on CPAP comes to ED w/ SOB x 3 days found to be RSV and COVID+ (vax x3) admitted for acute hypoxic respiratory failure on AVAPS   + COPD exacerbation likely due to RSV - on AVAPS at night , prednisone x 5 days , house pulm following   + Recent covid infection in october s/p monoclonal ab at facility - + PCR since initial test     1. Hypercapnic respiratory failure  - suspect 2/2 to COPD exacerbation. most likely etiology is RSV on RVP 11/14/21, no focal consolidation on CXR to suggest other infectious etiology   - patient also w/bilateral pleural effusions on CXR concerning for volume overload, which could be contributing to hypercapnia   - pending TTE __________   - placed on 3L NC -> now RA   - pulm following recommending AVAPs at night.     2. COPD, moderate.   - on home oxygen   - C/w Duonebs , prednisone 40mg PO daily x 5 days  - pulm following - AVApap Qhs   - per Pulmonary: okay to d/c on home CPAP (says he is on 11 cm h2o of cpap)    3.. COVID Pneumonia  - 3L -> Room air; AVAPs in PM  - current presentation unlikely 2/2 to active COVID, stop remdesevir and dexamethasone  - per chart review patient has received COVID vaccine and has been COVID positive since October and positive PCR not likely active infection     4.  CHF, chronic.   - Lasix PO   - pending TTE ___    5. DVT PPx: Lovenox 40mg BID   Code: Full     Case discussed with  ____ on ______ that patient is medically cleared for discharge. Medications is disccused with patient.   56M Hx Parkinsons, COPD, CHF, opiate dependence, depression, sleep apnea on CPAP comes to ED w/ SOB x 3 days found to be RSV and COVID+ (vax x3) admitted for acute hypoxic respiratory failure on AVAPS   + COPD exacerbation likely due to RSV - on AVAPS at night , prednisone x 5 days , house pulm following   + Recent covid infection in october s/p monoclonal ab at facility - + PCR since initial test     1. Hypercapnic respiratory failure  - suspect 2/2 to COPD exacerbation. most likely etiology is RSV on RVP 11/14/21, no focal consolidation on CXR to suggest other infectious etiology   - patient also w/bilateral pleural effusions on CXR concerning for volume overload, which could be contributing to hypercapnia   -  TTE done 11/17 -> 1. Mitral annular calcification, otherwise normal mitral  valve. Mild mitral regurgitation.  2. Calcified trileaflet aortic valve with normal opening.  3. Endocardium not well visualized; grossly normal left  ventricular systolic function.  4. The right ventricle is not well visualized.     - placed on 3L NC -> now RA   - pulm following recommending AVAPs at night.     2. COPD, moderate.   - on home oxygen   - C/w Duonebs , prednisone 40mg PO daily x 5 days  - pulm following - AVApap Qhs   - per Pulmonary: okay to d/c on home CPAP (says he is on 11 cm h2o of cpap)    3.. COVID Pneumonia  - 3L -> Room air; AVAPs in PM  - current presentation unlikely 2/2 to active COVID, stop remdesevir and dexamethasone  - per chart review patient has received COVID vaccine and has been COVID positive since October and positive PCR not likely active infection     4.  CHF, chronic.   - Lasix 20 mg PO  - Echo done 11/17 -> grossly normal LV systolic function  1. Mitral annular calcification, otherwise normal mitral  valve. Mild mitral regurgitation.  2. Calcified trileaflet aortic valve with normal opening.  3. Endocardium not well visualized; grossly normal left  ventricular systolic function.  4. The right ventricle is not well visualized.     5. DVT PPx: Lovenox 40mg BID   Code: Full     Case discussed with Dr. Philippe  on November 18,2021. that patient is medically cleared for discharge. Medications is disccused with patient.

## 2022-09-01 ENCOUNTER — APPOINTMENT (OUTPATIENT)
Dept: PULMONOLOGY | Facility: CLINIC | Age: 57
End: 2022-09-01

## 2022-09-01 VITALS
TEMPERATURE: 97.1 F | SYSTOLIC BLOOD PRESSURE: 120 MMHG | WEIGHT: 314 LBS | BODY MASS INDEX: 40.32 KG/M2 | HEART RATE: 81 BPM | DIASTOLIC BLOOD PRESSURE: 78 MMHG | OXYGEN SATURATION: 88 %

## 2022-09-01 VITALS — OXYGEN SATURATION: 93 % | HEART RATE: 75 BPM

## 2022-09-01 PROCEDURE — 99214 OFFICE O/P EST MOD 30 MIN: CPT

## 2022-12-05 NOTE — PROGRESS NOTE ADULT - PROBLEM SELECTOR PROBLEM 3
Echocardiogram advised.  The murmur does not suggest severe aortic stenosis by physical examination.   2019 novel coronavirus disease (COVID-19)

## 2023-03-09 ENCOUNTER — APPOINTMENT (OUTPATIENT)
Dept: PULMONOLOGY | Facility: CLINIC | Age: 58
End: 2023-03-09
Payer: MEDICARE

## 2023-03-09 VITALS
SYSTOLIC BLOOD PRESSURE: 118 MMHG | HEART RATE: 80 BPM | DIASTOLIC BLOOD PRESSURE: 82 MMHG | WEIGHT: 298 LBS | BODY MASS INDEX: 38.26 KG/M2 | OXYGEN SATURATION: 95 % | TEMPERATURE: 97.1 F

## 2023-03-09 DIAGNOSIS — G47.33 OBSTRUCTIVE SLEEP APNEA (ADULT) (PEDIATRIC): ICD-10-CM

## 2023-03-09 DIAGNOSIS — Z99.89 OBSTRUCTIVE SLEEP APNEA (ADULT) (PEDIATRIC): ICD-10-CM

## 2023-03-09 DIAGNOSIS — R53.83 OTHER FATIGUE: ICD-10-CM

## 2023-03-09 PROCEDURE — 99214 OFFICE O/P EST MOD 30 MIN: CPT

## 2023-03-20 PROBLEM — J44.9 CHRONIC OBSTRUCTIVE PULMONARY DISEASE, UNSPECIFIED: Chronic | Status: ACTIVE | Noted: 2019-04-23

## 2023-03-20 PROBLEM — Z86.16 PERSONAL HISTORY OF COVID-19: Chronic | Status: ACTIVE | Noted: 2022-05-12

## 2023-03-20 PROBLEM — F22 DELUSIONAL DISORDERS: Chronic | Status: INACTIVE | Noted: 2017-03-29 | Resolved: 2022-05-12

## 2023-03-20 PROBLEM — F25.9 SCHIZOAFFECTIVE DISORDER, UNSPECIFIED: Chronic | Status: INACTIVE | Noted: 2020-04-08 | Resolved: 2022-05-12

## 2023-06-03 PROBLEM — R53.83 FATIGUE: Status: ACTIVE | Noted: 2022-09-01

## 2023-06-03 PROBLEM — G47.33 OSA ON CPAP: Status: ACTIVE | Noted: 2022-04-18

## 2023-06-03 NOTE — PHYSICAL EXAM
[No Acute Distress] : no acute distress [Normal Oropharynx] : normal oropharynx [Normal Appearance] : normal appearance [No Neck Mass] : no neck mass [Normal Rate/Rhythm] : normal rate/rhythm [Normal S1, S2] : normal s1, s2 [No Murmurs] : no murmurs [No Resp Distress] : no resp distress [Clear to Auscultation Bilaterally] : clear to auscultation bilaterally [No Abnormalities] : no abnormalities [Benign] : benign [No Clubbing] : no clubbing [No Cyanosis] : no cyanosis [No Edema] : no edema [FROM] : FROM [Normal Color/ Pigmentation] : normal color/ pigmentation [No Focal Deficits] : no focal deficits [Oriented x3] : oriented x3 [Normal Affect] : normal affect [TextBox_2] : Increased BMI [TextBox_99] : ambulates with cane

## 2023-06-03 NOTE — HISTORY OF PRESENT ILLNESS
[Current] : current [< 20 pack-years] : < 20 pack-years [TextBox_4] : 56 yo male with hx of ALEJANDRO on CPAP via full face mask, presents for follow up. The patient is complaining of mask discomfort. He has sleep related complaints. [TextBox_11] : 1/5 [TextBox_13] : 40 [Awakes Unrefreshed] : awakes unrefreshed [Daytime Somnolence] : daytime somnolence [Fatigue] : fatigue [Snoring] : snoring

## 2023-08-17 NOTE — ED ADULT TRIAGE NOTE - ADDITIONAL SAFETY/BANDS...
Additional Safety/Bands: Hydroquinone Counseling:  Patient advised that medication may result in skin irritation, lightening (hypopigmentation), dryness, and burning.  In the event of skin irritation, the patient was advised to reduce the amount of the drug applied or use it less frequently.  Rarely, spots that are treated with hydroquinone can become darker (pseudoochronosis).  Should this occur, patient instructed to stop medication and call the office. The patient verbalized understanding of the proper use and possible adverse effects of hydroquinone.  All of the patient's questions and concerns were addressed.

## 2023-09-07 ENCOUNTER — APPOINTMENT (OUTPATIENT)
Dept: PULMONOLOGY | Facility: CLINIC | Age: 58
End: 2023-09-07
Payer: MEDICARE

## 2023-09-07 VITALS
BODY MASS INDEX: 39.8 KG/M2 | DIASTOLIC BLOOD PRESSURE: 80 MMHG | HEART RATE: 82 BPM | TEMPERATURE: 96.4 F | SYSTOLIC BLOOD PRESSURE: 136 MMHG | WEIGHT: 310 LBS | OXYGEN SATURATION: 95 %

## 2023-09-07 PROCEDURE — 94729 DIFFUSING CAPACITY: CPT

## 2023-09-07 PROCEDURE — 94060 EVALUATION OF WHEEZING: CPT

## 2023-09-07 PROCEDURE — 99214 OFFICE O/P EST MOD 30 MIN: CPT | Mod: 25

## 2023-09-07 PROCEDURE — 94727 GAS DIL/WSHOT DETER LNG VOL: CPT

## 2023-09-09 NOTE — PROCEDURE
[FreeTextEntry1] : PFT results: Mild obstruction with significant bronchodilator response.  Mild decrease in lung volumes with normal diffusion however.

## 2023-09-09 NOTE — HISTORY OF PRESENT ILLNESS
[Current] : current [< 20 pack-years] : < 20 pack-years [Awakes Unrefreshed] : awakes unrefreshed [Daytime Somnolence] : daytime somnolence [Fatigue] : fatigue [Snoring] : snoring [TextBox_4] : 58-year-old male with history of ALEJANDRO presents for follow-up.  The patient has had difficulty with CPAP use, presently using nasal mask.  He continues to have sleep-related complaints.  He claims to be a mouth breather.  He also states that he has prostate problem with frequent nocturnal awakenings.  The patient also has a history of "COPD" on albuterol MDI.  He last used medication over 4 months ago.  He denies hospitalization for respiratory problems.  Presently he feels "okay" without cough, chest pain or hemoptysis.  He continues to smoke 4 to 5 cigarettes daily. [TextBox_11] : 1/5 [TextBox_13] : 40

## 2023-09-09 NOTE — DISCUSSION/SUMMARY
[FreeTextEntry1] : 58-year-old male with history of ALEJANDRO having difficulty with CPAP use.  A chinstrap will be ordered to use in conjunction with his nasal mask, hoping to improve compliance and outcome.  A compliance report will also be obtained.  I reviewed the PFT.Diet and weight loss were discussed.  Results with the patient.  He is to use albuterol on an as-needed basis along for now.  Treatment just will depend on symptomatic needs.  He is to follow-up with his PMD as before.

## 2023-09-25 NOTE — ED ADULT NURSE NOTE - PRIMARY CARE PROVIDER
uc Cantharidin Pregnancy And Lactation Text: This medication has not been proven safe during pregnancy. It is unknown if this medication is excreted in breast milk.

## 2023-12-21 NOTE — PATIENT PROFILE ADULT - NSTRANSFERBELONGINGSDISPO_GEN_A_NUR
Lipids are at goal. No action needed. I would like the cholesterol checked again in 6 mo.Continue to eat healthy and clean.   Pt came in with a pair of shoes, clothes/with patient

## 2024-01-23 NOTE — ED BEHAVIORAL HEALTH ASSESSMENT NOTE - PATIENT SEEN BY
Procedure rescheduled    Scheduled date of colonoscopy (as of today):2/13/2024  Physician performing colonoscopy:Dr Jenkins  Location of colonoscopy:[facility]  Bowel prep: 2 Day Marcelino  Patient has instructions   Clearances: pacemaker   
Attending Psychiatrist supervising NP/Trainee and meeting pt

## 2024-03-07 ENCOUNTER — APPOINTMENT (OUTPATIENT)
Dept: PULMONOLOGY | Facility: CLINIC | Age: 59
End: 2024-03-07
Payer: MEDICARE

## 2024-03-07 VITALS
HEIGHT: 74 IN | HEART RATE: 62 BPM | SYSTOLIC BLOOD PRESSURE: 110 MMHG | TEMPERATURE: 98 F | WEIGHT: 287 LBS | BODY MASS INDEX: 36.83 KG/M2 | RESPIRATION RATE: 16 BRPM | OXYGEN SATURATION: 95 % | DIASTOLIC BLOOD PRESSURE: 70 MMHG

## 2024-03-07 PROCEDURE — 99214 OFFICE O/P EST MOD 30 MIN: CPT

## 2024-03-07 NOTE — HISTORY OF PRESENT ILLNESS
[>= 20 pack years] : >= 20 pack years [Current] : current [Awakes Unrefreshed] : awakes unrefreshed [Awakes with Dry Mouth] : awakes with dry mouth [Daytime Somnolence] : daytime somnolence [Fatigue] : fatigue [Snoring] : snoring [TextBox_4] : 58-year-old male with history of ALEJANDRO presents for follow-up.  Patient complains of nasal mask discomfort despite multiple chinstraps.  He was unable to tolerate full facemask in the past.  He continues to complain complain of sleep-related symptoms.  He continues to smoke 1 pack daily. [TextBox_11] : 1 [TextBox_13] : 40

## 2024-03-07 NOTE — PHYSICAL EXAM
[No Acute Distress] : no acute distress [Normal Oropharynx] : normal oropharynx [Normal Appearance] : normal appearance [No Neck Mass] : no neck mass [Normal Rate/Rhythm] : normal rate/rhythm [Normal S1, S2] : normal s1, s2 [No Murmurs] : no murmurs [No Resp Distress] : no resp distress [Clear to Auscultation Bilaterally] : clear to auscultation bilaterally [No Abnormalities] : no abnormalities [Benign] : benign [No Clubbing] : no clubbing [No Cyanosis] : no cyanosis [No Edema] : no edema [FROM] : FROM [Normal Color/ Pigmentation] : normal color/ pigmentation [No Focal Deficits] : no focal deficits [Oriented x3] : oriented x3 [Normal Affect] : normal affect [TextBox_99] : ambulates with cane [TextBox_2] : Increased BMI

## 2024-03-07 NOTE — DISCUSSION/SUMMARY
[FreeTextEntry1] : 58-year-old male with history of ALEJANDRO unable to tolerate CPAP because of mask discomfort.  He will be referred for an oral appliance.  Need to treat sleep apnea was stressed.  He is to avoid sedative hypnotic meds and excessive alcohol intake.  Smoking cessation was strongly recommended.  Low-dose screening chest CT should be performed also.  He is to follow-up with his PMD as before.

## 2024-06-06 ENCOUNTER — APPOINTMENT (OUTPATIENT)
Dept: PULMONOLOGY | Facility: CLINIC | Age: 59
End: 2024-06-06

## 2024-07-29 NOTE — ED BEHAVIORAL HEALTH ASSESSMENT NOTE - NS ED [facility] [employer] MEDICAL RECORD REVIEWED CONSENT OBTAINED YN
[Average] : average [Cooperative] : cooperative [Euthymic] : euthymic [Full] : full [Clear] : clear [Linear/Goal Directed] : linear/goal directed [None] : none [None Reported] : none reported [WNL] : within normal limits Yes

## 2024-11-10 NOTE — ED PROVIDER NOTE - PATIENT PORTAL LINK FT
None
You can access the FollowMyHealth Patient Portal offered by [facility] by registering at the following website: [email or web address]. By joining [facility] [employer]’s FollowMyHealth portal, you will also be able to view your health information using other applications (apps) compatible with our system.

## 2025-05-14 NOTE — PROGRESS NOTE BEHAVIORAL HEALTH - THOUGHT PROCESS
MEDICATION NAME: Norco  STRENGTH: 5-325  LAST FILL DATE: 3/17/25  DATE LAST TAKEN: 25  QUANTITY FILLED: 60  QUANTITY REMAININ  COUNT COMPLETED BY: MIGUEL KERN RN and SOTO LANGFORD      MEDICATION NAME: Tramadol  STRENGTH: 50mg  LAST FILL DATE: 25  DATE LAST TAKEN: 25  QUANTITY FILLED: 120  QUANTITY REMAININ  COUNT COMPLETED BY: MIGUEL KERN RN and SOTO LANGFORD      UDS LAST COMPLETED:   CONTROLLED SUBSTANCES AGREEMENT LAST SIGNED:   ORT LAST COMPLETED:  Modified [employer] disability form filled out annually.         Disorganized